# Patient Record
Sex: FEMALE | Race: WHITE | Employment: OTHER | URBAN - METROPOLITAN AREA
[De-identification: names, ages, dates, MRNs, and addresses within clinical notes are randomized per-mention and may not be internally consistent; named-entity substitution may affect disease eponyms.]

---

## 2018-09-05 ENCOUNTER — OFFICE VISIT (OUTPATIENT)
Dept: FAMILY MEDICINE CLINIC | Age: 68
End: 2018-09-05
Payer: MEDICARE

## 2018-09-05 VITALS
DIASTOLIC BLOOD PRESSURE: 70 MMHG | RESPIRATION RATE: 16 BRPM | TEMPERATURE: 98.4 F | HEART RATE: 62 BPM | OXYGEN SATURATION: 95 % | WEIGHT: 136 LBS | HEIGHT: 67 IN | BODY MASS INDEX: 21.35 KG/M2 | SYSTOLIC BLOOD PRESSURE: 118 MMHG

## 2018-09-05 DIAGNOSIS — I73.9 PVD (PERIPHERAL VASCULAR DISEASE) WITH CLAUDICATION (HCC): ICD-10-CM

## 2018-09-05 DIAGNOSIS — Z12.31 ENCOUNTER FOR SCREENING MAMMOGRAM FOR MALIGNANT NEOPLASM OF BREAST: ICD-10-CM

## 2018-09-05 DIAGNOSIS — I10 ESSENTIAL HYPERTENSION: Primary | ICD-10-CM

## 2018-09-05 DIAGNOSIS — E78.2 MIXED HYPERLIPIDEMIA: ICD-10-CM

## 2018-09-05 DIAGNOSIS — I48.20 CHRONIC ATRIAL FIBRILLATION (HCC): ICD-10-CM

## 2018-09-05 DIAGNOSIS — Z23 NEED FOR STREPTOCOCCUS PNEUMONIAE VACCINATION: ICD-10-CM

## 2018-09-05 PROCEDURE — 99214 OFFICE O/P EST MOD 30 MIN: CPT | Performed by: FAMILY MEDICINE

## 2018-09-05 PROCEDURE — 90732 PPSV23 VACC 2 YRS+ SUBQ/IM: CPT | Performed by: FAMILY MEDICINE

## 2018-09-05 PROCEDURE — G0009 ADMIN PNEUMOCOCCAL VACCINE: HCPCS | Performed by: FAMILY MEDICINE

## 2018-09-05 NOTE — PROGRESS NOTES
9/18/2018    Chief Complaint   Patient presents with    Hypertension     Pt here for 6 month check-up -- (no refills needed. ..gets from Cardiologist office) -- states no new complaints    Other     Pt is requesting an order for [facility] & [facility]    Lela Arvizu is a 77 y.o. patient that presents today for:    Hypertension: Patient is here for follow up chronic hypertension. Patient is  compliant with lifestyle modifications: is exercising and is adherent to low salt diet. Patient is  well controlled. Patient denies chest pain, diaphoresis, dyspnea, dyspnea on exertion, peripheral edema, palpitations, HA, visual issues. Cardiovascular risk factors: advanced age (older than 54 for men, 72 for women) and hypertension. Patient does not smoke. Is currently on Metoprolol 25 mg BID, spironolactone 25 mg, furosemide 20 mg. Taking as prescribed. No adverse effects. Last EKG:    Hyperlipidemia:  Patient presents with hyperlipidemia. Is asymptomatic. This is a chronic problem. Patient is  well controlled, as reviewed and seen on most recent labs. Is currently on . Atorvastatin 20mg Compliance with treatment thus far has been good. No adverse effects. The patient does use medications that may worsen dyslipidemias (corticosteroids, progestins, anabolic steroids, diuretics, beta-blockers, amiodarone, cyclosporine, olanzapine). The patient exercises rarely. Patient is not a smoker. Atrial fibrillation:  Patient is here to follow up regarding a fib. This is a/an chronic problem. It is  stable and controlled at this time. She does not have history of cardioversion or ablation. Current anticoagulation includes pradaxa. Patient does not have any active bleeding. Patient does not have chest pain, shortness of breath, palpitations, dizziness, light headedness, or syncope. She does see a Cardiologist regularly.   Last Cardiology progress note is  in medical record and, if available, 98.4 °F (36.9 °C)   01/16/18 97.8 °F (36.6 °C)   04/27/13 98.6 °F (37 °C) (Oral)     Wt Readings from Last 3 Encounters:   09/05/18 136 lb (61.7 kg)   01/16/18 132 lb (59.9 kg)   04/27/13 140 lb (63.5 kg)     BP Readings from Last 3 Encounters:   09/05/18 118/70   01/16/18 120/72   04/27/13 144/78     Pulse Readings from Last 3 Encounters:   09/05/18 62   01/16/18 52   04/27/13 104       General appearance: alert, well appearing, and in no distress, oriented to person, place, and time and normal appearing weight. CVS exam: normal rate, regular rhythm, normal S1, S2, no murmurs, rubs, clicks or gallops. Radial pulses 2+ bilateral.  PT/DP pulse 2+ bilat. No C/C/E    Chest: clear to auscultation, no wheezes, rales or rhonchi, symmetric air entry. Abdomen: Soft, non-tender, non-distended, positive BS in all 4 quadrants    Extremities:Dorsalis pedis pulses palpated bilaterally, no clubbing, cyanosis, edema or erythema     SKIN: no lesions, jaundice, petechiae, pallor, cyanosis, ecchymosis    NEURO: gross motor exam normal by observation, gait normal    Mental status - alert, oriented to person, place, and time, normal mood, behavior, speech, dress, motor activity, and thought processes      Assessment/Plan  Alicia Ruggiero was seen today for hypertension and other. Diagnoses and all orders for this visit:    Essential hypertension  -  Stable, continue medications as prescribed. Mixed hyperlipidemia  -  Stable, continue medications as prescribed. Chronic atrial fibrillation [facility])  - Per Cardiology. PVD (peripheral vascular disease) with claudication (HCC)  -     Handicap Placard MISC; by Does not apply route Duration: Lifetime    Need for Streptococcus pneumoniae vaccination  -     Pneumococcal polysaccharide vaccine 23-valent greater than or equal to 1yo subcutaneous/IM    Encounter for screening mammogram for malignant neoplasm of breast  -     ANKUSH DIGITAL SCREEN W CAD BILATERAL;  Future          Return

## 2018-09-19 ENCOUNTER — HOSPITAL ENCOUNTER (OUTPATIENT)
Dept: GENERAL RADIOLOGY | Age: 68
Discharge: HOME | End: 2018-09-21
Payer: MEDICARE

## 2018-09-19 DIAGNOSIS — Z12.31 ENCOUNTER FOR SCREENING MAMMOGRAM FOR MALIGNANT NEOPLASM OF BREAST: ICD-10-CM

## 2018-09-19 PROCEDURE — 77063 BREAST TOMOSYNTHESIS BI: CPT

## 2018-10-22 ENCOUNTER — NURSE ONLY (OUTPATIENT)
Dept: FAMILY MEDICINE CLINIC | Age: 68
End: 2018-10-22
Payer: MEDICARE

## 2018-10-22 DIAGNOSIS — Z23 NEED FOR VACCINATION: Primary | ICD-10-CM

## 2018-10-22 PROCEDURE — 90662 IIV NO PRSV INCREASED AG IM: CPT | Performed by: FAMILY MEDICINE

## 2018-10-22 PROCEDURE — G0008 ADMIN INFLUENZA VIRUS VAC: HCPCS | Performed by: FAMILY MEDICINE

## 2019-03-15 ENCOUNTER — TELEPHONE (OUTPATIENT)
Dept: FAMILY MEDICINE CLINIC | Age: 69
End: 2019-03-15

## 2019-03-15 RX ORDER — OSELTAMIVIR PHOSPHATE 75 MG/1
75 CAPSULE ORAL 2 TIMES DAILY
Qty: 10 CAPSULE | Refills: 0 | Status: SHIPPED | OUTPATIENT
Start: 2019-03-15 | End: 2019-03-20

## 2019-03-15 NOTE — TELEPHONE ENCOUNTER
Pt called and stated her granddaughter went to the [facility] clinic and was dx with the influ but doesn't know which one.    Sumi Santos is not having any symptoms but should she be on Tamiflu ? Pt uses [facility] in [place].  Nicki's ph# [phone].

## 2019-06-18 LAB
ALBUMIN SERPL-MCNC: NORMAL G/DL
ALP BLD-CCNC: NORMAL U/L
ALT SERPL-CCNC: NORMAL U/L
ANION GAP SERPL CALCULATED.3IONS-SCNC: NORMAL MMOL/L
AST SERPL-CCNC: NORMAL U/L
BILIRUB SERPL-MCNC: NORMAL MG/DL (ref 0.1–1.4)
BUN BLDV-MCNC: NORMAL MG/DL
CALCIUM SERPL-MCNC: NORMAL MG/DL
CHLORIDE BLD-SCNC: NORMAL MMOL/L
CO2: NORMAL MMOL/L
CREAT SERPL-MCNC: NORMAL MG/DL
GFR CALCULATED: NORMAL
GLUCOSE BLD-MCNC: 72 MG/DL
POTASSIUM SERPL-SCNC: NORMAL MMOL/L
SODIUM BLD-SCNC: NORMAL MMOL/L
TOTAL PROTEIN: NORMAL

## 2019-09-11 ENCOUNTER — OFFICE VISIT (OUTPATIENT)
Dept: FAMILY MEDICINE CLINIC | Age: 69
End: 2019-09-11
Payer: MEDICARE

## 2019-09-11 VITALS
OXYGEN SATURATION: 98 % | SYSTOLIC BLOOD PRESSURE: 108 MMHG | HEART RATE: 68 BPM | BODY MASS INDEX: 21.28 KG/M2 | WEIGHT: 135.6 LBS | DIASTOLIC BLOOD PRESSURE: 70 MMHG | RESPIRATION RATE: 16 BRPM | HEIGHT: 67 IN | TEMPERATURE: 98.1 F

## 2019-09-11 DIAGNOSIS — E78.2 MIXED HYPERLIPIDEMIA: ICD-10-CM

## 2019-09-11 DIAGNOSIS — L30.9 DERMATITIS: ICD-10-CM

## 2019-09-11 DIAGNOSIS — I48.20 CHRONIC ATRIAL FIBRILLATION (HCC): ICD-10-CM

## 2019-09-11 DIAGNOSIS — I10 ESSENTIAL HYPERTENSION: ICD-10-CM

## 2019-09-11 DIAGNOSIS — I42.9 CARDIOMYOPATHY, UNSPECIFIED TYPE (HCC): ICD-10-CM

## 2019-09-11 DIAGNOSIS — Z00.00 ROUTINE GENERAL MEDICAL EXAMINATION AT A HEALTH CARE FACILITY: Primary | ICD-10-CM

## 2019-09-11 PROCEDURE — G0438 PPPS, INITIAL VISIT: HCPCS | Performed by: FAMILY MEDICINE

## 2019-09-11 RX ORDER — ATORVASTATIN CALCIUM 40 MG/1
40 TABLET, FILM COATED ORAL DAILY
COMMUNITY
Start: 2019-08-08

## 2019-09-11 RX ORDER — FUROSEMIDE 40 MG/1
TABLET ORAL
Refills: 3 | COMMUNITY
Start: 2019-09-07

## 2019-09-11 ASSESSMENT — PATIENT HEALTH QUESTIONNAIRE - PHQ9
1. LITTLE INTEREST OR PLEASURE IN DOING THINGS: 0
SUM OF ALL RESPONSES TO PHQ9 QUESTIONS 1 & 2: 0
2. FEELING DOWN, DEPRESSED OR HOPELESS: 0
SUM OF ALL RESPONSES TO PHQ QUESTIONS 1-9: 0
SUM OF ALL RESPONSES TO PHQ QUESTIONS 1-9: 0

## 2019-09-11 ASSESSMENT — LIFESTYLE VARIABLES: HOW OFTEN DO YOU HAVE A DRINK CONTAINING ALCOHOL: 0

## 2019-09-11 NOTE — PROGRESS NOTES
regularly involved in providing care):   Patient Care Team:  Merly Hidalgo DO as PCP - General (Family Medicine)  Meryl Hidalgo DO as PCP [facility]    Wt Readings from Last 3 Encounters:   09/11/19 135 lb 9.6 oz (61.5 kg)   09/05/18 136 lb (61.7 kg)   01/16/18 132 lb (59.9 kg)     Vitals:    09/11/19 1047   BP: 108/70   Pulse: 68   Resp: 16   Temp: 98.1 °F (36.7 °C)   SpO2: 98%   Weight: 135 lb 9.6 oz (61.5 kg)   Height: 5' 6.5\" (1.689 m)     Body mass index is 21.56 kg/m². Based upon direct observation of the patient, evaluation of cognition reveals recent and remote memory intact. General Appearance: alert and oriented to person, place and time, well developed and well- nourished, in no acute distress  Skin: warm and dry, no rash or erythema  Head: normocephalic and atraumatic  Eyes: pupils equal, round, and reactive to light, extraocular eye movements intact, conjunctivae normal  ENT: tympanic membrane, external ear and ear canal normal bilaterally, nose without deformity, nasal mucosa and turbinates normal without polyps  Neck: supple and non-tender without mass, no thyromegaly or thyroid nodules, no cervical lymphadenopathy  Pulmonary/Chest: clear to auscultation bilaterally- no wheezes, rales or rhonchi, normal air movement, no respiratory distress  Cardiovascular: normal rate, regular rhythm, normal S1 and S2, no murmurs, rubs, clicks, or gallops, distal pulses intact, no carotid bruits  Abdomen: soft, non-tender, non-distended, normal bowel sounds, no masses or organomegaly  Extremities: no cyanosis, clubbing or edema  Musculoskeletal: normal range of motion, no joint swelling, deformity or tenderness  Neurologic: reflexes normal and symmetric, no cranial nerve deficit, gait, coordination and speech normal    Patient's complete Health Risk Assessment and screening values have been reviewed and are found in Flowsheets.  The following problems were reviewed today and where indicated follow up appointments were made and/or referrals ordered. Positive Risk Factor Screenings with Interventions:     No Positive Risk Factors identified today. Personalized Preventive Plan   Current Health Maintenance Status  Immunization History   Administered Date(s) Administered    Influenza, High Dose (Fluzone 65 yrs and older) 10/25/2017, 10/22/2018    Pneumococcal Conjugate 13-valent ([id number]) 07/18/2017    Pneumococcal Polysaccharide ([id number]) 09/05/2018        Health Maintenance   Topic Date Due    Hepatitis C screen  1950    DTaP/Tdap/Td vaccine (1 - Tdap) 01/17/1967    Shingles Vaccine (1 of 2) 01/17/1998    Colon cancer screen colonoscopy  01/17/1998    TSH testing  02/12/2012    Potassium monitoring  02/15/2012    Creatinine monitoring  02/15/2012    DEXA (modify frequency per FRAX score)  01/17/2013    Lipid screen  02/12/2016    Annual Wellness Visit (AWV)  06/20/2019    Flu vaccine (1) 09/01/2019    Breast cancer screen  09/19/2019    Pneumococcal 65+ years Vaccine  Completed     Recommendations for Preventive Services Due: see orders and patient instructions/AVS.  . Recommended screening schedule for the next 5-10 years is provided to the patient in written form: see Patient Instructions/AVS.    Desmond Merritt was seen today for medicare awv. Diagnoses and all orders for this visit:    Routine general medical examination at a health care facility    Mixed hyperlipidemia  -     Comprehensive Metabolic Panel; Future  -     Lipid Panel; Future    Essential hypertension  -     CBC; Future  -     Comprehensive Metabolic Panel; Future    Chronic atrial fibrillation (HCC)  -     TSH without Reflex;  Future    Cardiomyopathy, unspecified type ([facility] [address]

## 2019-09-30 LAB
ALBUMIN SERPL-MCNC: NORMAL G/DL
ALP BLD-CCNC: NORMAL U/L
ALT SERPL-CCNC: NORMAL U/L
ANION GAP SERPL CALCULATED.3IONS-SCNC: NORMAL MMOL/L
AST SERPL-CCNC: NORMAL U/L
BASOPHILS ABSOLUTE: NORMAL /ΜL
BASOPHILS RELATIVE PERCENT: NORMAL %
BILIRUB SERPL-MCNC: NORMAL MG/DL (ref 0.1–1.4)
BUN BLDV-MCNC: NORMAL MG/DL
CALCIUM SERPL-MCNC: NORMAL MG/DL
CHLORIDE BLD-SCNC: NORMAL MMOL/L
CHOLESTEROL, TOTAL: 103 MG/DL
CHOLESTEROL/HDL RATIO: 2.5
CO2: NORMAL MMOL/L
CREAT SERPL-MCNC: NORMAL MG/DL
EOSINOPHILS ABSOLUTE: NORMAL /ΜL
EOSINOPHILS RELATIVE PERCENT: NORMAL %
GFR CALCULATED: NORMAL
GLUCOSE BLD-MCNC: 86 MG/DL
HCT VFR BLD CALC: 37.9 % (ref 36–46)
HDLC SERPL-MCNC: 41 MG/DL (ref 35–70)
HEMOGLOBIN: 12.5 G/DL (ref 12–16)
LDL CHOLESTEROL CALCULATED: 43 MG/DL (ref 0–160)
LYMPHOCYTES ABSOLUTE: NORMAL /ΜL
LYMPHOCYTES RELATIVE PERCENT: NORMAL %
MCH RBC QN AUTO: NORMAL PG
MCHC RBC AUTO-ENTMCNC: NORMAL G/DL
MCV RBC AUTO: NORMAL FL
MONOCYTES ABSOLUTE: NORMAL /ΜL
MONOCYTES RELATIVE PERCENT: NORMAL %
NEUTROPHILS ABSOLUTE: NORMAL /ΜL
NEUTROPHILS RELATIVE PERCENT: NORMAL %
PLATELET # BLD: NORMAL K/ΜL
PMV BLD AUTO: NORMAL FL
POTASSIUM SERPL-SCNC: NORMAL MMOL/L
RBC # BLD: NORMAL 10^6/ΜL
SODIUM BLD-SCNC: NORMAL MMOL/L
TOTAL PROTEIN: NORMAL
TRIGL SERPL-MCNC: 79 MG/DL
TSH SERPL DL<=0.05 MIU/L-ACNC: 0.44 UIU/ML
VLDLC SERPL CALC-MCNC: NORMAL MG/DL
WBC # BLD: NORMAL 10^3/ML

## 2019-10-02 ENCOUNTER — TELEPHONE (OUTPATIENT)
Dept: FAMILY MEDICINE CLINIC | Age: 69
End: 2019-10-02

## 2019-10-16 DIAGNOSIS — E78.2 MIXED HYPERLIPIDEMIA: ICD-10-CM

## 2019-10-16 DIAGNOSIS — I10 ESSENTIAL HYPERTENSION: ICD-10-CM

## 2019-10-17 ENCOUNTER — NURSE ONLY (OUTPATIENT)
Dept: FAMILY MEDICINE CLINIC | Age: 69
End: 2019-10-17
Payer: MEDICARE

## 2019-10-17 DIAGNOSIS — E78.2 MIXED HYPERLIPIDEMIA: ICD-10-CM

## 2019-10-17 DIAGNOSIS — I10 ESSENTIAL HYPERTENSION: ICD-10-CM

## 2019-10-17 DIAGNOSIS — I48.20 CHRONIC ATRIAL FIBRILLATION (HCC): ICD-10-CM

## 2019-10-17 PROCEDURE — 90653 IIV ADJUVANT VACCINE IM: CPT | Performed by: FAMILY MEDICINE

## 2019-10-17 PROCEDURE — G0008 ADMIN INFLUENZA VIRUS VAC: HCPCS | Performed by: FAMILY MEDICINE

## 2020-01-09 LAB
ALBUMIN SERPL-MCNC: NORMAL G/DL
ALP BLD-CCNC: NORMAL U/L
ALT SERPL-CCNC: NORMAL U/L
ANION GAP SERPL CALCULATED.3IONS-SCNC: NORMAL MMOL/L
AST SERPL-CCNC: NORMAL U/L
BILIRUB SERPL-MCNC: NORMAL MG/DL
BUN BLDV-MCNC: NORMAL MG/DL
CALCIUM SERPL-MCNC: NORMAL MG/DL
CHLORIDE BLD-SCNC: NORMAL MMOL/L
CO2: NORMAL
CREAT SERPL-MCNC: NORMAL MG/DL
GFR CALCULATED: NORMAL
GLUCOSE BLD-MCNC: 109 MG/DL
POTASSIUM SERPL-SCNC: NORMAL MMOL/L
SODIUM BLD-SCNC: NORMAL MMOL/L
TOTAL PROTEIN: NORMAL

## 2020-01-14 ENCOUNTER — TELEPHONE (OUTPATIENT)
Dept: PULMONOLOGY | Age: 70
End: 2020-01-14

## 2020-01-14 ENCOUNTER — OFFICE VISIT (OUTPATIENT)
Dept: FAMILY MEDICINE CLINIC | Age: 70
End: 2020-01-14
Payer: MEDICARE

## 2020-01-14 VITALS
OXYGEN SATURATION: 98 % | WEIGHT: 125 LBS | TEMPERATURE: 97.5 F | HEIGHT: 66 IN | BODY MASS INDEX: 20.09 KG/M2 | RESPIRATION RATE: 18 BRPM | HEART RATE: 82 BPM | SYSTOLIC BLOOD PRESSURE: 100 MMHG | DIASTOLIC BLOOD PRESSURE: 68 MMHG

## 2020-01-14 PROCEDURE — 99214 OFFICE O/P EST MOD 30 MIN: CPT | Performed by: FAMILY MEDICINE

## 2020-01-14 PROCEDURE — G8510 SCR DEP NEG, NO PLAN REQD: HCPCS | Performed by: FAMILY MEDICINE

## 2020-01-14 ASSESSMENT — PATIENT HEALTH QUESTIONNAIRE - PHQ9
SUM OF ALL RESPONSES TO PHQ QUESTIONS 1-9: 1
2. FEELING DOWN, DEPRESSED OR HOPELESS: 1
SUM OF ALL RESPONSES TO PHQ QUESTIONS 1-9: 1
1. LITTLE INTEREST OR PLEASURE IN DOING THINGS: 0
SUM OF ALL RESPONSES TO PHQ9 QUESTIONS 1 & 2: 1

## 2020-01-14 NOTE — TELEPHONE ENCOUNTER
Call to pt after Dr. Aliza Mcdonald received message from PCP re: referral being sent over. Dr. Aliza Mcdonald request pt appt in office next week. Advised pt that RN will schedule her for Thursday Jan 23rd@ 1130am; pt receptive to this appt date/time. Pt instructed to bring CD with Images of most recent CT Scan for Dr. María Lopez to review along with report from diagnostics. Pt will obtain CD with images from [facility] in [place] and bring with her to appt. Advised office will mail out appt letter. Spoke with staff at Dr. Angeline Bryant office as well and they will fax over referral and any info on pt.

## 2020-01-14 NOTE — PROGRESS NOTES
1/15/2020    Chief Complaint   Patient presents with    Pulmonary Nodule     brought CT reports wants to talk about results     Weight Loss       HPI    Ceasar Salazar is a 68 y.o. patient that presents today for:    Increasing dyspnea and weight loss. Saw NP at Cardiology office. Had CXR. Showed nodules. Follow-up CT showed  1 cm spiculated mass and 1 cm additional nodule. No CP, diaphoresis, palp, HA, visual issues. Has an appointment with Pulmonology in [facility] on 1/28/20. Would like to see someone locally. Patient's past medical, surgical, social and/or family history reviewed, updated in chart, and are non-contributory (unless otherwise stated). Medications and allergies also reviewed and updated in chart.      ROS Unless otherwise specified  Review of Systems - General ROS: negative for - chills, fatigue, fever, night sweats, sleep disturbance, weight gain or weight loss  Psychological ROS: negative for - anxiety, behavioral disorder, depression, hallucinations, irritability, memory difficulties, mood swings, sleep disturbances or suicidal ideation  ENT ROS: negative for - epistaxis, headaches, hearing change, nasal congestion, nasal discharge, nasal polyps, sinus pain, tinnitus, vertigo or visual changes  Hematological and Lymphatic ROS: negative for - bleeding problems, blood clots, fatigue or swollen lymph nodes  Respiratory ROS: negative for - cough, orthopnea, shortness of breath, sputum changes, tachypnea or wheezing  Cardiovascular ROS: negative for - chest pain, dyspnea on exertion, irregular heartbeat, loss of consciousness, palpitations, paroxysmal nocturnal dyspnea or rapid heart rate  Gastrointestinal ROS: negative for - abdominal pain, blood in stools, change in bowel habits, constipation, diarrhea, gas/bloating, heartburn or nausea/vomiting  Musculoskeletal ROS: negative for - joint pain, joint stiffness, joint swelling or muscle, back pain, bowel or bladder

## 2020-01-23 ENCOUNTER — OFFICE VISIT (OUTPATIENT)
Dept: PULMONOLOGY | Age: 70
End: 2020-01-23
Payer: MEDICARE

## 2020-01-23 VITALS — OXYGEN SATURATION: 100 % | HEART RATE: 77 BPM

## 2020-01-23 PROCEDURE — 99203 OFFICE O/P NEW LOW 30 MIN: CPT | Performed by: INTERNAL MEDICINE

## 2020-01-23 PROCEDURE — 99204 OFFICE O/P NEW MOD 45 MIN: CPT | Performed by: INTERNAL MEDICINE

## 2020-01-23 NOTE — PROGRESS NOTES
Take 25 mg by mouth daily       amiodarone (CORDARONE) 200 MG tablet Take 200 mg by mouth daily       dabigatran (PRADAXA) 150 MG capsule Take 150 mg by mouth 2 times daily        No current facility-administered medications for this visit. SOCIAL AND OCCUPATIONAL HEALTH:  Social History     Tobacco Use   Smoking Status Former Smoker    Packs/day: 0.50    Years: 40.00    Pack years: 20.00    Types: Cigarettes    Last attempt to quit: 2011    Years since quittin.3   Smokeless Tobacco Former User    Quit date: 2011     TB :No   Industrial exposure:some   Birds :no     SURGICAL HISTORY:   Past Surgical History:   Procedure Laterality Date    COLONOSCOPY      Dendron     TUBAL LIGATION         FAMILY HISTORY:   Lung cancer:no   DVT or PE no     REVIEW OF SYSTEMS:  Constitutional: General health is good . There has been no weight changes. No fevers, fatigue or weakness. Head: Patient denies any history of trauma, convulsive disorder or syncope. Skin:  Patient denies history of changes in pigmentation, eruptions or pruritus. No easy bruising or bleeding. EENT: no nasal congestion   Cardiovascular ,No chest pain ,No edema ,  Respiration:SOB: + ,WALKER :+  Gastrointestinal:No GI bleed ,no melena  ,no hematemesis    Musculoskeletal: no joint pain ,no swelling  Neurological:no , syncope. Denies twitching, convulsions, loss of consciousness or memory. Endocrine:  . No history of goiter, exophthalmos or dryness of skin. The patient has no history of diabetes. Hematopoietic:  No history of bleeding disorders or easy bruising. Rheumatic:  No connective tissue disease history or polyarthritis/inflammatory joint disease. PHYSICAL EXAMINATION:  Vitals:    20 1042   Pulse: 77   SpO2: 100%     Constitutional: This is a well developed, well nourished 65y.o. year old female who is alert, oriented, cooperative and in no apparent distress. Head was normocephalic and atraumatic. EENT: Mallampati class :  Extraocular muscles intact. External canals are patent and no discharge was appreciated. Septum was midline,   mucosa was without erythema, exudates or cobblestoning. No thrush was noted. Neck: Supple without thyromegaly. No elevated JVP. Trachea was midline. No carotid bruits were auscultated. Respiratory: rhocnhi bilateral     Cardiovascular: Regular without murmur, clicks, gallops or rubs. There is no left or right ventricular heave. Pulses:  Carotid, radial and femoral pulses were equally bilaterally. Abdomen: Slightly rounded and soft without organomegaly. No rebound, rigidity or guarding was appreciated. Lymphatic: No lymphadenopathy. Musculoskeletal: no edema or cyanosis     Extremities: no swelling   Skin:  Warm and dry. Good color, turgor and pigmentation. No lesions or scars. Neurological/Psychiatric: The patient's general behavior, level of consciousness, thought content and emotional status is normal.  Cranial nerves II-XII are intact. DATA:         PFT shows mild COPD with FEV1 74 % post bronchodilator     IMPRESSION:    1-Lung nodules   2-Mediastinal adenopathy   3-Emphysema   4-weight loss   5- multiple lung nodules      6- Mild COPD           PLAN:      -will do PET scan   -Lymph nodes seems more than 1 cm so she will need EBUS for sure to make sure she is not metastatic ,  -since we are doing EBUS ,I will try to navigate to the lung nodules ,  If lymph nodes negative and no answer from [facility] ,then CT guided biopsy  -discuss above with her and she agreed to proceed     Will start Spirivia down the oad    She is on Pradaxa and will ask her cardiologist for clearance and if ok to stop Pradaxa 3 days before procedure     Thank you for allowing me to participate in [address] [place] [address].  I will keep following with you ,should you have any concerns ,please contact me at [phone]     Sincerely,        Sujit Reese MD  Pulmonary & Critical Care

## 2020-01-27 ENCOUNTER — HOSPITAL ENCOUNTER (OUTPATIENT)
Age: 70
Discharge: HOME | End: 2020-01-27
Payer: MEDICARE

## 2020-01-27 LAB
ANION GAP SERPL CALCULATED.3IONS-SCNC: 15 MMOL/L (ref 7–16)
BUN BLDV-MCNC: 24 MG/DL (ref 8–23)
CALCIUM SERPL-MCNC: 10.1 MG/DL (ref 8.6–10.2)
CHLORIDE BLD-SCNC: 100 MMOL/L (ref 98–107)
CO2: 25 MMOL/L (ref 22–29)
CREAT SERPL-MCNC: 1.2 MG/DL (ref 0.5–1)
EKG ATRIAL RATE: 78 BPM
EKG P AXIS: 74 DEGREES
EKG P-R INTERVAL: 144 MS
EKG Q-T INTERVAL: 400 MS
EKG QRS DURATION: 96 MS
EKG QTC CALCULATION (BAZETT): 456 MS
EKG R AXIS: -15 DEGREES
EKG T AXIS: 134 DEGREES
EKG VENTRICULAR RATE: 78 BPM
GFR AFRICAN AMERICAN: 53
GFR NON-AFRICAN AMERICAN: 44 ML/MIN/1.73
GLUCOSE BLD-MCNC: 93 MG/DL (ref 74–99)
POTASSIUM SERPL-SCNC: 4.4 MMOL/L (ref 3.5–5)
SODIUM BLD-SCNC: 140 MMOL/L (ref 132–146)

## 2020-01-27 PROCEDURE — 80048 BASIC METABOLIC PNL TOTAL CA: CPT

## 2020-01-27 PROCEDURE — 93010 ELECTROCARDIOGRAM REPORT: CPT | Performed by: INTERNAL MEDICINE

## 2020-01-27 PROCEDURE — 36415 COLL VENOUS BLD VENIPUNCTURE: CPT

## 2020-01-27 PROCEDURE — 93005 ELECTROCARDIOGRAM TRACING: CPT | Performed by: ANESTHESIOLOGY

## 2020-01-28 ENCOUNTER — TELEPHONE (OUTPATIENT)
Dept: FAMILY MEDICINE CLINIC | Age: 70
End: 2020-01-28

## 2020-01-28 ENCOUNTER — HOSPITAL ENCOUNTER (OUTPATIENT)
Dept: PET IMAGING | Age: 70
Discharge: HOME | End: 2020-01-30
Payer: MEDICARE

## 2020-01-28 ENCOUNTER — HOSPITAL ENCOUNTER (OUTPATIENT)
Dept: CT IMAGING | Age: 70
Discharge: HOME | End: 2020-01-30
Payer: MEDICARE

## 2020-01-28 LAB — METER GLUCOSE: 63 MG/DL (ref 74–99)

## 2020-01-28 PROCEDURE — 82962 GLUCOSE BLOOD TEST: CPT

## 2020-01-28 PROCEDURE — A9552 F18 FDG: HCPCS | Performed by: RADIOLOGY

## 2020-01-28 PROCEDURE — 3430000000 HC RX DIAGNOSTIC RADIOPHARMACEUTICAL: Performed by: RADIOLOGY

## 2020-01-28 PROCEDURE — 78815 PET IMAGE W/CT SKULL-THIGH: CPT

## 2020-01-28 PROCEDURE — 71250 CT THORAX DX C-: CPT

## 2020-01-28 RX ORDER — FLUDEOXYGLUCOSE F 18 200 MCI/ML
15 INJECTION, SOLUTION INTRAVENOUS
Status: COMPLETED | OUTPATIENT
Start: 2020-01-28 | End: 2020-01-28

## 2020-01-28 RX ADMIN — FLUDEOXYGLUCOSE F 18 15 MILLICURIE: 200 INJECTION, SOLUTION INTRAVENOUS at 07:45

## 2020-01-28 NOTE — PROGRESS NOTES
if you are to spend the night in the hospital.     PARKING INSTRUCTIONS:   [x] Arrival Time:_______1130______  · [x] Parking lot '\"I\"  is located on [facility] (the corner of [facility] and [facility]). To enter, press the button and the gate will lift. A free token will be provided to exit the lot. One car per patient is allowed to park in this lot. All other cars are to park on [address] [place] [address] either in the parking garage or the handicap lot. [] To reach the [facility] lobby from [address] [place] [address], upon entering the hospital, take elevator B to the 3rd floor. EDUCATION INSTRUCTIONS:      [] Knee or hip replacement booklet & exercise pamphlets given. [] [address] placed in chart. [] Pre-admission Testing educational folder given  [] Incentive Spirometry,coughing & deep breathing exercises reviewed. []Medication information sheet(s)   []Fluoroscopy-Xray used in surgery reviewed with patient. Educational pamphlet placed in chart. []Pain: Post-op pain is normal and to be expected. You will be asked to rate your pain from 0-10(a zero is not acceptable-education is needed). Your post-op pain goal is:  [] Ask your nurse for your pain medication. [] Joint camp offered. [] Joint replacement booklets given. [] Other:___________________________    MEDICATION INSTRUCTIONS:   [x]Bring a complete list of your medications, please write the last time you took the medicine, give this list to the nurse. [x] Take the following medications the morning of surgery with 1-2 ounces of water:   [] Stop herbal supplements and vitamins 5 days before your surgery. [] DO NOT take any diabetic medicine the morning of surgery. Follow instructions for insulin the day before surgery. [] If you are diabetic and your blood sugar is low or you feel symptomatic, you may drink 1-2 ounces of apple juice or take a glucose tablet.   The morning of your procedure, you may call the pre-op area if you have concerns about your blood sugar [phone]. [] Use your inhalers the morning of surgery. Bring your emergency inhaler with you day of surgery. [x] Follow physician instructions regarding any blood thinners you may be taking. WHAT TO EXPECT:  [x] The day of surgery you will be greeted and checked in by the [employer].  In addition, you will be registered in the [place] by a Patient Access Representative. Please bring your photo ID and insurance card. A nurse will greet you in accordance to the time you are needed in the pre-op area to prepare you for surgery. Please do not be discouraged if you are not greeted in the order you arrive as there are many variables that are involved in patient preparation. Your patience is greatly appreciated as you wait for your nurse. Please bring in items such as: books, magazines, newspapers, electronics, or any other items  to occupy your time in the waiting area. []  Delays may occur with surgery and staff will make a sincere effort to keep you informed of delays. If any delays occur with your procedure, we apologize ahead of time for your inconvenience as we recognize the value of your time. Yes...

## 2020-01-29 ENCOUNTER — TELEPHONE (OUTPATIENT)
Dept: FAMILY MEDICINE CLINIC | Age: 70
End: 2020-01-29

## 2020-01-29 NOTE — TELEPHONE ENCOUNTER
Buddy Ornelas just returned my call and said Dr. Jed Bullock will be in office today and would probably send over a note for clearance. He was aware that she was having procedure because told her OK to stop pradaxa.

## 2020-01-29 NOTE — TELEPHONE ENCOUNTER
Luis Fernando Samayoa needs cardiac clearance for bronchoscopy on 1/31/20, general anesthesia. Sent copy of EKG to Dr. Elham Dudley to review. Left message with .  Also faxed copy of Dr. García Estevez EKG from July 2019 to pre-admission testing because they did not have previous one for review

## 2020-01-31 ENCOUNTER — ANESTHESIA (OUTPATIENT)
Dept: ENDOSCOPY | Age: 70
End: 2020-01-31
Payer: MEDICARE

## 2020-01-31 ENCOUNTER — APPOINTMENT (OUTPATIENT)
Dept: GENERAL RADIOLOGY | Age: 70
End: 2020-01-31
Attending: INTERNAL MEDICINE
Payer: MEDICARE

## 2020-01-31 ENCOUNTER — HOSPITAL ENCOUNTER (OUTPATIENT)
Age: 70
Setting detail: OUTPATIENT SURGERY
Discharge: HOME | End: 2020-01-31
Attending: INTERNAL MEDICINE | Admitting: INTERNAL MEDICINE
Payer: MEDICARE

## 2020-01-31 ENCOUNTER — ANESTHESIA EVENT (OUTPATIENT)
Dept: ENDOSCOPY | Age: 70
End: 2020-01-31
Payer: MEDICARE

## 2020-01-31 VITALS
TEMPERATURE: 97.1 F | RESPIRATION RATE: 18 BRPM | SYSTOLIC BLOOD PRESSURE: 104 MMHG | DIASTOLIC BLOOD PRESSURE: 72 MMHG | HEART RATE: 84 BPM | OXYGEN SATURATION: 94 % | BODY MASS INDEX: 20.09 KG/M2 | HEIGHT: 66 IN | WEIGHT: 125 LBS

## 2020-01-31 VITALS — DIASTOLIC BLOOD PRESSURE: 51 MMHG | SYSTOLIC BLOOD PRESSURE: 90 MMHG | OXYGEN SATURATION: 100 %

## 2020-01-31 LAB
ANION GAP SERPL CALCULATED.3IONS-SCNC: 15 MMOL/L (ref 7–16)
BUN BLDV-MCNC: 19 MG/DL (ref 8–23)
CALCIUM SERPL-MCNC: 9.9 MG/DL (ref 8.6–10.2)
CHLORIDE BLD-SCNC: 98 MMOL/L (ref 98–107)
CO2: 22 MMOL/L (ref 22–29)
CREAT SERPL-MCNC: 1.1 MG/DL (ref 0.5–1)
GFR AFRICAN AMERICAN: 59
GFR NON-AFRICAN AMERICAN: 49 ML/MIN/1.73
GLUCOSE BLD-MCNC: 87 MG/DL (ref 74–99)
POTASSIUM SERPL-SCNC: 5 MMOL/L (ref 3.5–5)
SODIUM BLD-SCNC: 135 MMOL/L (ref 132–146)

## 2020-01-31 PROCEDURE — 6360000002 HC RX W HCPCS: Performed by: NURSE ANESTHETIST, CERTIFIED REGISTERED

## 2020-01-31 PROCEDURE — 87015 SPECIMEN INFECT AGNT CONCNTJ: CPT

## 2020-01-31 PROCEDURE — 71045 X-RAY EXAM CHEST 1 VIEW: CPT

## 2020-01-31 PROCEDURE — 2709999900 HC NON-CHARGEABLE SUPPLY: Performed by: INTERNAL MEDICINE

## 2020-01-31 PROCEDURE — 7100000010 HC PHASE II RECOVERY - FIRST 15 MIN: Performed by: INTERNAL MEDICINE

## 2020-01-31 PROCEDURE — 80048 BASIC METABOLIC PNL TOTAL CA: CPT

## 2020-01-31 PROCEDURE — 87102 FUNGUS ISOLATION CULTURE: CPT

## 2020-01-31 PROCEDURE — 87205 SMEAR GRAM STAIN: CPT

## 2020-01-31 PROCEDURE — 36415 COLL VENOUS BLD VENIPUNCTURE: CPT

## 2020-01-31 PROCEDURE — 87206 SMEAR FLUORESCENT/ACID STAI: CPT

## 2020-01-31 PROCEDURE — 7100000000 HC PACU RECOVERY - FIRST 15 MIN: Performed by: INTERNAL MEDICINE

## 2020-01-31 PROCEDURE — 3603165200 HC BRNCHSC EBUS GUIDED SAMPL 1/2 NODE STATION/STRUX: Performed by: INTERNAL MEDICINE

## 2020-01-31 PROCEDURE — C1725 CATH, TRANSLUMIN NON-LASER: HCPCS | Performed by: INTERNAL MEDICINE

## 2020-01-31 PROCEDURE — 2580000003 HC RX 258: Performed by: INTERNAL MEDICINE

## 2020-01-31 PROCEDURE — 3609011900 HC BRONCHOSCOPY NEEDLE BX TRACHEA MAIN STEM&/BRON: Performed by: INTERNAL MEDICINE

## 2020-01-31 PROCEDURE — 88305 TISSUE EXAM BY PATHOLOGIST: CPT

## 2020-01-31 PROCEDURE — 3209999900 FLUORO FOR SURGICAL PROCEDURES

## 2020-01-31 PROCEDURE — 89051 BODY FLUID CELL COUNT: CPT

## 2020-01-31 PROCEDURE — 3700000000 HC ANESTHESIA ATTENDED CARE: Performed by: INTERNAL MEDICINE

## 2020-01-31 PROCEDURE — 31624 DX BRONCHOSCOPE/LAVAGE: CPT | Performed by: INTERNAL MEDICINE

## 2020-01-31 PROCEDURE — 31629 BRONCHOSCOPY/NEEDLE BX EACH: CPT | Performed by: INTERNAL MEDICINE

## 2020-01-31 PROCEDURE — 87070 CULTURE OTHR SPECIMN AEROBIC: CPT

## 2020-01-31 PROCEDURE — 3609010800 HC BRONCHOSCOPY ALVEOLAR LAVAGE: Performed by: INTERNAL MEDICINE

## 2020-01-31 PROCEDURE — 2500000003 HC RX 250 WO HCPCS: Performed by: NURSE ANESTHETIST, CERTIFIED REGISTERED

## 2020-01-31 PROCEDURE — 3700000001 HC ADD 15 MINUTES (ANESTHESIA): Performed by: INTERNAL MEDICINE

## 2020-01-31 PROCEDURE — 31653 BRONCH EBUS SAMPLNG 3/> NODE: CPT | Performed by: INTERNAL MEDICINE

## 2020-01-31 PROCEDURE — 88112 CYTOPATH CELL ENHANCE TECH: CPT

## 2020-01-31 PROCEDURE — 3609011200 HC BRONCHOSCOPY W/TRANSBRONCL NDL ASPIR BX EA ADDL LOBE: Performed by: INTERNAL MEDICINE

## 2020-01-31 PROCEDURE — 7100000001 HC PACU RECOVERY - ADDTL 15 MIN: Performed by: INTERNAL MEDICINE

## 2020-01-31 PROCEDURE — 2720000010 HC SURG SUPPLY STERILE: Performed by: INTERNAL MEDICINE

## 2020-01-31 PROCEDURE — 88173 CYTOPATH EVAL FNA REPORT: CPT

## 2020-01-31 PROCEDURE — 7100000011 HC PHASE II RECOVERY - ADDTL 15 MIN: Performed by: INTERNAL MEDICINE

## 2020-01-31 PROCEDURE — 3609155400 HC ADD ON COMPUTER ASSISTED NAVIGATION: Performed by: INTERNAL MEDICINE

## 2020-01-31 PROCEDURE — 87116 MYCOBACTERIA CULTURE: CPT

## 2020-01-31 RX ORDER — SODIUM CHLORIDE 9 MG/ML
INJECTION, SOLUTION INTRAVENOUS CONTINUOUS
Status: DISCONTINUED | OUTPATIENT
Start: 2020-01-31 | End: 2020-01-31 | Stop reason: HOSPADM

## 2020-01-31 RX ORDER — ROCURONIUM BROMIDE 10 MG/ML
INJECTION, SOLUTION INTRAVENOUS PRN
Status: DISCONTINUED | OUTPATIENT
Start: 2020-01-31 | End: 2020-01-31 | Stop reason: SDUPTHER

## 2020-01-31 RX ORDER — LIDOCAINE HYDROCHLORIDE 20 MG/ML
INJECTION, SOLUTION INTRAVENOUS PRN
Status: DISCONTINUED | OUTPATIENT
Start: 2020-01-31 | End: 2020-01-31 | Stop reason: SDUPTHER

## 2020-01-31 RX ORDER — DEXAMETHASONE SODIUM PHOSPHATE 10 MG/ML
INJECTION INTRAMUSCULAR; INTRAVENOUS PRN
Status: DISCONTINUED | OUTPATIENT
Start: 2020-01-31 | End: 2020-01-31 | Stop reason: SDUPTHER

## 2020-01-31 RX ORDER — PROPOFOL 10 MG/ML
INJECTION, EMULSION INTRAVENOUS CONTINUOUS PRN
Status: DISCONTINUED | OUTPATIENT
Start: 2020-01-31 | End: 2020-01-31 | Stop reason: SDUPTHER

## 2020-01-31 RX ORDER — PROPOFOL 10 MG/ML
INJECTION, EMULSION INTRAVENOUS PRN
Status: DISCONTINUED | OUTPATIENT
Start: 2020-01-31 | End: 2020-01-31 | Stop reason: SDUPTHER

## 2020-01-31 RX ORDER — LABETALOL HYDROCHLORIDE 5 MG/ML
INJECTION, SOLUTION INTRAVENOUS PRN
Status: DISCONTINUED | OUTPATIENT
Start: 2020-01-31 | End: 2020-01-31 | Stop reason: SDUPTHER

## 2020-01-31 RX ORDER — ALFENTANIL HYDROCHLORIDE 500 UG/ML
INJECTION INTRAVENOUS PRN
Status: DISCONTINUED | OUTPATIENT
Start: 2020-01-31 | End: 2020-01-31 | Stop reason: SDUPTHER

## 2020-01-31 RX ADMIN — LIDOCAINE HYDROCHLORIDE 60 MG: 20 INJECTION, SOLUTION INTRAVENOUS at 13:23

## 2020-01-31 RX ADMIN — PROPOFOL 100 MCG/KG/MIN: 10 INJECTION, EMULSION INTRAVENOUS at 13:24

## 2020-01-31 RX ADMIN — SODIUM CHLORIDE: 9 INJECTION, SOLUTION INTRAVENOUS at 12:12

## 2020-01-31 RX ADMIN — LABETALOL HYDROCHLORIDE 5 MG: 5 INJECTION INTRAVENOUS at 13:53

## 2020-01-31 RX ADMIN — ALFENTANIL HYDROCHLORIDE 500 MCG: 500 INJECTION INTRAVENOUS at 15:00

## 2020-01-31 RX ADMIN — SUGAMMADEX 113 MG: 100 INJECTION, SOLUTION INTRAVENOUS at 15:02

## 2020-01-31 RX ADMIN — LABETALOL HYDROCHLORIDE 5 MG: 5 INJECTION INTRAVENOUS at 13:38

## 2020-01-31 RX ADMIN — ALFENTANIL HYDROCHLORIDE 500 MCG: 500 INJECTION INTRAVENOUS at 13:56

## 2020-01-31 RX ADMIN — PROPOFOL 90 MG: 10 INJECTION, EMULSION INTRAVENOUS at 13:23

## 2020-01-31 RX ADMIN — Medication 30 MG: at 13:23

## 2020-01-31 RX ADMIN — LABETALOL HYDROCHLORIDE 5 MG: 5 INJECTION INTRAVENOUS at 13:25

## 2020-01-31 RX ADMIN — DEXAMETHASONE SODIUM PHOSPHATE 10 MG: 10 INJECTION INTRAMUSCULAR; INTRAVENOUS at 13:23

## 2020-01-31 ASSESSMENT — PULMONARY FUNCTION TESTS
PIF_VALUE: 50
PIF_VALUE: 50
PIF_VALUE: 41
PIF_VALUE: 46
PIF_VALUE: 40
PIF_VALUE: 50
PIF_VALUE: 40
PIF_VALUE: 20
PIF_VALUE: 28
PIF_VALUE: 3
PIF_VALUE: 50
PIF_VALUE: 50
PIF_VALUE: 38
PIF_VALUE: 32
PIF_VALUE: 31
PIF_VALUE: 50
PIF_VALUE: 40
PIF_VALUE: 2
PIF_VALUE: 50
PIF_VALUE: 46
PIF_VALUE: 50
PIF_VALUE: 39
PIF_VALUE: 38
PIF_VALUE: 28
PIF_VALUE: 3
PIF_VALUE: 50
PIF_VALUE: 50
PIF_VALUE: 42
PIF_VALUE: 21
PIF_VALUE: 48
PIF_VALUE: 41
PIF_VALUE: 4
PIF_VALUE: 41
PIF_VALUE: 40
PIF_VALUE: 5
PIF_VALUE: 20
PIF_VALUE: 50
PIF_VALUE: 37
PIF_VALUE: 21
PIF_VALUE: 28
PIF_VALUE: 1
PIF_VALUE: 49
PIF_VALUE: 39
PIF_VALUE: 50
PIF_VALUE: 34
PIF_VALUE: 41
PIF_VALUE: 1
PIF_VALUE: 36
PIF_VALUE: 39
PIF_VALUE: 34
PIF_VALUE: 32
PIF_VALUE: 39
PIF_VALUE: 50
PIF_VALUE: 29
PIF_VALUE: 50
PIF_VALUE: 44
PIF_VALUE: 3
PIF_VALUE: 50
PIF_VALUE: 37
PIF_VALUE: 33
PIF_VALUE: 30
PIF_VALUE: 50
PIF_VALUE: 38
PIF_VALUE: 40
PIF_VALUE: 41
PIF_VALUE: 50
PIF_VALUE: 39
PIF_VALUE: 21
PIF_VALUE: 20
PIF_VALUE: 46
PIF_VALUE: 50
PIF_VALUE: 2
PIF_VALUE: 32
PIF_VALUE: 49
PIF_VALUE: 50
PIF_VALUE: 20
PIF_VALUE: 44
PIF_VALUE: 3
PIF_VALUE: 28
PIF_VALUE: 50
PIF_VALUE: 39
PIF_VALUE: 40
PIF_VALUE: 50
PIF_VALUE: 39
PIF_VALUE: 39
PIF_VALUE: 3
PIF_VALUE: 50
PIF_VALUE: 40
PIF_VALUE: 2
PIF_VALUE: 20
PIF_VALUE: 50
PIF_VALUE: 40
PIF_VALUE: 50
PIF_VALUE: 27
PIF_VALUE: 21
PIF_VALUE: 50
PIF_VALUE: 40

## 2020-01-31 ASSESSMENT — PAIN - FUNCTIONAL ASSESSMENT: PAIN_FUNCTIONAL_ASSESSMENT: 0-10

## 2020-01-31 ASSESSMENT — PAIN SCALES - GENERAL: PAINLEVEL_OUTOF10: 0

## 2020-01-31 NOTE — ANESTHESIA PRE PROCEDURE
Department of Anesthesiology  Preprocedure Note       Name:  Raimundo Kelly   Age:  65 y.o.  :  1950                                          MRN:  [id number]         Date:  2020      Surgeon: Filiberto Hoawrd):  Sophie Garcia MD    Procedure: BRONCHOSCOPY EBUS (N/A )  BRONCHOSCOPY NAVIGATIONAL (N/A )    Medications prior to admission:   Prior to Admission medications    Medication Sig Start Date End Date Taking? Authorizing Provider   furosemide (LASIX) 40 MG tablet TAKE ONE TABLET BY MOUTH EVERY DAY 19  Yes Historical Provider, MD   atorvastatin (LIPITOR) 40 MG tablet  19  Yes Historical Provider, MD   metoprolol tartrate (LOPRESSOR) 25 MG tablet Take 25 mg by mouth 2 times daily  17  Yes Historical Provider, MD   spironolactone (ALDACTONE) 25 MG tablet Take 25 mg by mouth daily  18  Yes Historical Provider, MD   dabigatran (PRADAXA) 150 MG capsule Take 150 mg by mouth 2 times daily    Yes Historical Provider, MD   hydrocortisone 2.5 % cream Apply topically 2 times daily.  19   Makeda Hidalgo DO   Handicap Placard MISC by Does not apply route Duration: Lifetime 18   Makeda Hidalgo DO       Current medications:    Current Facility-Administered Medications   Medication Dose Route Frequency Provider Last Rate Last Dose    0.9 % sodium chloride infusion   Intravenous Continuous Muneer Mick Lance  mL/hr at 20 1212         Allergies:  No Known Allergies    Problem List:    Patient Active Problem List   Diagnosis Code    Varicose veins of both lower extremities I83.93    Spider veins I78.1    PVD (peripheral vascular disease) with claudication (HCC) I73.9    Atrial fibrillation (HCC) I48.91    Cardiomyopathy ([facility] [address] I42.9    Hyperlipidemia E78.5    Hypertension I10       Past Medical History:        Diagnosis Date    Atrial fibrillation ([facility] [address]     Dr. Jonathan Gonzales [facility])     lung    Cardiomyopathy [facility])     Dr. Bev Obrien, [facility]    CVA (cerebral vascular

## 2020-01-31 NOTE — PROGRESS NOTES
Dr. Isabel Abel called in. Notified patient's sao2 94% on room air and second chest x ray done ane was unremarkable. Ok to send patient home.

## 2020-01-31 NOTE — PROGRESS NOTES
Patient signed out per anesthesia and transferred to [address]. Patient admitted to [facility] Patient placed on appropriate monitors. . Patient assisted with liquids and nutrition. Family in with patient.

## 2020-01-31 NOTE — PROGRESS NOTES
Discharge instructions given, medications and follow up instructions reviewed. Patient and family member verbalized understanding, no other noted or stated problems at this time. Patient will follow up with physicians as directed. Patient notified that if she has increased bleeding from lungs or chest pain she is to come to Emergency. And that office will call her Monday to schedule her follow up appointment.

## 2020-02-01 LAB
APPEARANCE FLUID: CLEAR
BASO FLUID: 0 %
CELL COUNT FLUID TYPE: NORMAL
COLOR FLUID: COLORLESS
EOSINOPHIL FLUID: 4 %
GRAM STAIN ORDERABLE: NORMAL
LYMPHOCYTES, BODY FLUID: 12 %
MONOCYTE, FLUID: 38 %
NEUTROPHIL, FLUID: 46 %
NUCLEATED CELLS FLUID: 4 /UL
RBC FLUID: <2000 /UL

## 2020-02-01 NOTE — OP NOTE
[address], AND NAVIGATION BRONCHOSCOPY PROCEDURE NOTE    DATE OF PROCEDURE: 1/ 31 / 2020    INDICATIONS & HISTORY:  Lung Nodule with mediastinal adenopathy    PREOPERATIVE DIAGNOSIS:    Nodule with mediastinal adenopathy Possible       POSTOPERATIVE DIAGNOSES:  Possible lung cancer      PROCEDURE PERFORMED:   1-Diagnotic, Fiberoptic Bronchoscopy  2-Bronchial alveolar lavage from right upper lobe  3-EBUS- FNA station  4 R ,then station 7, station R 11  4-navigational bronchoscopy  5-needle aspirate using fluoroscopy from right lower lobe superior segment              SURGEON:    Laura Arana     ASSISTANT:   Bronchoscopy Nursing/Technical Team/OR Team    SEDATION:  propofol   [facility],       ANESTHESIA:    Lidocaine 2% ,       ANESTHESIOLOGIST:  Per EPIC Note    SPECIMENS:  [x] Bronchial sample(s) for      Fungal smear & culture,   Acid-fast bacillus Smear and Culture,    Gram stain, C&S,    PCP               Cytology               BD glucan              Galactomanin      Description of Procedure: The patient was taken to the endoscopy suite, Valarie Javed  and the procedure verified as Flexible Fiberoptic Bronchoscopy with navigational bronchoscopy and EBUS      After the patient was controlled with sedation bronchoscope was introduced through ET tube without difficulty. The scope was then passed into the trachea. Additional 2% lidocaine was used topically within the airway. Careful inspection of the tracheal lumen was accomplished. The scope was sequentially passed into all bronchial airways.            Endobronchial findings:   Vocal cord not seen    Trachea:  Normal mucosa, he the part seen outside the ET tube  Airam  Normal mucosa     Right Main Stem Bronchus   erythematous with thick mucous and the fragile mucosa  Right Upper Lobe Bronchi erythematous with thick mucous and the fragile mucosa    Right Middle Lobe Bronchi  Normal

## 2020-02-03 ENCOUNTER — TELEPHONE (OUTPATIENT)
Dept: PULMONOLOGY | Age: 70
End: 2020-02-03

## 2020-02-03 LAB
CULTURE, RESPIRATORY: NORMAL
SMEAR, RESPIRATORY: NORMAL

## 2020-02-03 RX ORDER — AMOXICILLIN AND CLAVULANATE POTASSIUM 875; 125 MG/1; MG/1
1 TABLET, FILM COATED ORAL 2 TIMES DAILY
Qty: 10 TABLET | Refills: 0 | Status: SHIPPED | OUTPATIENT
Start: 2020-02-03 | End: 2020-02-10

## 2020-02-05 ENCOUNTER — TELEPHONE (OUTPATIENT)
Dept: PULMONOLOGY | Age: 70
End: 2020-02-05

## 2020-02-12 ENCOUNTER — TELEPHONE (OUTPATIENT)
Dept: PULMONOLOGY | Age: 70
End: 2020-02-12

## 2020-02-12 NOTE — TELEPHONE ENCOUNTER
Call returned to pt after VM requesting RN discuss with Dr. Carlene Thompson her concerns about being off Pradaxa for procedure. Advised pt that RN has sent a message to Dr. Carlene Thompson to update him on her concerns. Advised pt RN will call her after talking with Dr. Carlene Thompson.

## 2020-03-09 LAB
FUNGUS (MYCOLOGY) CULTURE: NORMAL
FUNGUS STAIN: NORMAL

## 2020-03-24 LAB
AFB CULTURE (MYCOBACTERIA): NORMAL
AFB SMEAR: NORMAL

## 2020-05-04 ENCOUNTER — OFFICE VISIT (OUTPATIENT)
Dept: ORTHOPEDIC SURGERY | Age: 70
End: 2020-05-04
Payer: MEDICARE

## 2020-05-04 VITALS — TEMPERATURE: 98 F | HEIGHT: 66 IN | BODY MASS INDEX: 20.25 KG/M2 | WEIGHT: 126 LBS

## 2020-05-04 PROBLEM — M17.11 PRIMARY OSTEOARTHRITIS OF RIGHT KNEE: Status: ACTIVE | Noted: 2020-05-04

## 2020-05-04 PROBLEM — M16.11 PRIMARY OSTEOARTHRITIS OF RIGHT HIP: Status: ACTIVE | Noted: 2020-05-04

## 2020-05-04 PROCEDURE — 99203 OFFICE O/P NEW LOW 30 MIN: CPT | Performed by: ORTHOPAEDIC SURGERY

## 2020-05-04 PROCEDURE — 20610 DRAIN/INJ JOINT/BURSA W/O US: CPT | Performed by: ORTHOPAEDIC SURGERY

## 2020-05-04 RX ORDER — BUPIVACAINE HYDROCHLORIDE 2.5 MG/ML
3 INJECTION, SOLUTION INFILTRATION; PERINEURAL ONCE
Status: COMPLETED | OUTPATIENT
Start: 2020-05-04 | End: 2020-05-04

## 2020-05-04 RX ORDER — TRIAMCINOLONE ACETONIDE 40 MG/ML
80 INJECTION, SUSPENSION INTRA-ARTICULAR; INTRAMUSCULAR ONCE
Status: COMPLETED | OUTPATIENT
Start: 2020-05-04 | End: 2020-05-04

## 2020-05-04 RX ADMIN — TRIAMCINOLONE ACETONIDE 80 MG: 40 INJECTION, SUSPENSION INTRA-ARTICULAR; INTRAMUSCULAR at 11:30

## 2020-05-04 RX ADMIN — BUPIVACAINE HYDROCHLORIDE 7.5 MG: 2.5 INJECTION, SOLUTION INFILTRATION; PERINEURAL at 11:29

## 2020-05-04 NOTE — PROGRESS NOTES
Chief Complaint:   Chief Complaint   Patient presents with    Knee Pain     Pain in right knee for a few months. No previous injury of surgeries. No xrays. HPI   38-year-old woman complaining of several months of right knee anterior medial pain with some swelling. No history of surgery or specific injury to the right knee. Takes Tylenol for pain. Cannot use NSAIDs since she is on Coumadin for atrial fibrillation. She was recently worked up for pulmonary nodules, patient states she was diagnosed as negative for cancer but diagnosed with fungal infection valley fever.     Patient Active Problem List   Diagnosis    Varicose veins of both lower extremities    Spider veins    PVD (peripheral vascular disease) with claudication ([address]    Atrial fibrillation ([address]    Cardiomyopathy ([address]    Hyperlipidemia    Hypertension    Primary osteoarthritis of right knee    Primary osteoarthritis of right hip       Past Medical History:   Diagnosis Date    Atrial fibrillation ([address]     Dr. Ekta Miller [facility]) 2020    lung    Cardiomyopathy [facility])     Dr. Matthew Benítez, [address] CVA (cerebral vascular accident) [facility])     2014    Hyperlipidemia     Hypertension     PVD (peripheral vascular disease) with claudication ([address] 9/7/2016    Spider veins 9/7/2016    Varicose veins of both lower extremities 9/7/2016       Past Surgical History:   Procedure Laterality Date    BRONCHOSCOPY N/A 1/31/2020    BRONCHOSCOPY EBUS performed by Dominic Colin MD at [facility] 1/31/2020    BRONCHOSCOPY NAVIGATIONAL performed by Dominic Colin MD at [address] [place] [address]  1/31/2020    BRONCHOSCOPY ALVEOLAR LAVAGE performed by Dominic Colin MD at [address] [place] [address]  1/31/2020    BRONCHOSCOPY/TRANSBRONCHIAL NEEDLE BIOPSY performed by Dominic Colin MD at [address] [place] [address]  1/31/2020    BRONCHOSCOPY/TRANSBRONCHIAL NEEDLE BIOPSY ADDL LOBE performed by Dominic Colin MD at [facility] [address]  2017    ablation [facility]    COLONOSCOPY      [place]     TUBAL LIGATION         Current Outpatient Medications   Medication Sig Dispense Refill    furosemide (LASIX) 40 MG tablet TAKE ONE TABLET BY MOUTH EVERY DAY  3    atorvastatin (LIPITOR) 40 MG tablet Take 40 mg by mouth daily       Handicap Placard MISC by Does not apply route Duration: Lifetime 1 each 0    metoprolol tartrate (LOPRESSOR) 25 MG tablet Take 25 mg by mouth 2 times daily       spironolactone (ALDACTONE) 25 MG tablet Take 25 mg by mouth daily       dabigatran (PRADAXA) 150 MG capsule Take 150 mg by mouth 2 times daily        Current Facility-Administered Medications   Medication Dose Route Frequency Provider Last Rate Last Dose    triamcinolone acetonide (KENALOG-40) injection 80 mg  80 mg Intra-articular Once Heidy Lee MD        bupivacaine (MARCAINE) 0.25 % injection 7.5 mg  3 mL Intra-articular Once Heidy Lee MD           No Known Allergies    Social History     Socioeconomic History    Marital status:      Spouse name: None    Number of children: None    Years of education: None    Highest education level: None   Occupational History    Occupation: sales   Social Needs    Financial resource strain: None    Food insecurity     Worry: None     Inability: None    Transportation needs     Medical: None     Non-medical: None   Tobacco Use    Smoking status: Former Smoker     Packs/day: 0.50     Years: 40.00     Pack years: 20.00     Types: Cigarettes     Last attempt to quit: 2011     Years since quittin.6    Smokeless tobacco: Former User     Quit date: 2011   Substance and Sexual Activity    Alcohol use: No    Drug use: No    Sexual activity: Yes     Partners: Male   Lifestyle    Physical activity     Days per week: None     Minutes per session: None    Stress: None   Relationships    Social connections     Talks on phone: None without bone lesions, lytic changes or severe joint narrowing. Impression: Radiographically moderate degenerative changes right knee. ASSESSMENT/PLAN:    Galina was seen today for knee pain. Diagnoses and all orders for this visit:    Primary osteoarthritis of right knee  -     PA ARTHROCENTESIS ASPIR&/INJ MAJOR JT/BURSA W/O US    Pain and swelling of knee, right  -     XR HIP 2-3 VW W PELVIS RIGHT; Future  -     XR Knee Bilateral Standing; Future  -     XR KNEE RIGHT (1-2 VIEWS); Future    Primary osteoarthritis of right hip    Other orders  -     triamcinolone acetonide (KENALOG-40) injection 80 mg  -     bupivacaine (MARCAINE) 0.25 % injection 7.5 mg    Findings and images reviewed with the patient. She does have osteoarthritis in her right knee and hip but neither appear to be to the surgical level. She may have a degenerative meniscus tear right knee which may be manageable nonoperatively. She request a trial of steroid injection right knee. Also reviewed use of Tylenol and cyclical exercises for the right knee. After review of indications, risks and limitations, after alcohol prep, right knee injection with triamcinolone 80mg and 3 cc 0.25% marcaine given today. Pt tolerated without complication. Return in about 6 weeks (around 6/15/2020).        Guilherme Levi MD    5/4/2020  11:21 AM

## 2020-05-08 ENCOUNTER — TELEPHONE (OUTPATIENT)
Dept: ORTHOPEDIC SURGERY | Age: 70
End: 2020-05-08

## 2020-07-01 ENCOUNTER — HOSPITAL ENCOUNTER (OUTPATIENT)
Age: 70
Discharge: HOME | End: 2020-07-03
Payer: MEDICARE

## 2020-07-01 ENCOUNTER — OFFICE VISIT (OUTPATIENT)
Dept: ORTHOPEDIC SURGERY | Age: 70
End: 2020-07-01
Payer: MEDICARE

## 2020-07-01 VITALS — BODY MASS INDEX: 20.25 KG/M2 | HEIGHT: 66 IN | TEMPERATURE: 97.6 F | WEIGHT: 126 LBS

## 2020-07-01 LAB
APPEARANCE FLUID: NORMAL
CELL COUNT FLUID TYPE: NORMAL
COLOR FLUID: NORMAL
MONOCYTE, FLUID: 16 %
NEUTROPHIL, FLUID: 84 %
NUCLEATED CELLS FLUID: NORMAL /UL
RBC FLUID: NORMAL /UL

## 2020-07-01 PROCEDURE — 89060 EXAM SYNOVIAL FLUID CRYSTALS: CPT

## 2020-07-01 PROCEDURE — 87070 CULTURE OTHR SPECIMN AEROBIC: CPT

## 2020-07-01 PROCEDURE — 89051 BODY FLUID CELL COUNT: CPT

## 2020-07-01 PROCEDURE — 99214 OFFICE O/P EST MOD 30 MIN: CPT | Performed by: ORTHOPAEDIC SURGERY

## 2020-07-01 PROCEDURE — 87205 SMEAR GRAM STAIN: CPT

## 2020-07-01 PROCEDURE — 20610 DRAIN/INJ JOINT/BURSA W/O US: CPT | Performed by: ORTHOPAEDIC SURGERY

## 2020-07-01 NOTE — PROGRESS NOTES
Follow Up Visit     Cristóbal Garibay returns today for follow up visit regarding her right knee, she has mild swelling. Treatment thus far has included a steroid injection 5/4/20 with minimal relief by Dr. Mahamed Vale, she also had a reaction from the steroid which caused her face to flush. She recently developed swelling in her knee and also has some swelling in her ankle and was concerned. She is here for follow-up due to Dr. Mahamed Vale being unavailable. Physical Exam:     No data recorded    General: Alert and oriented x3, no acute distress  Cardiovascular/pulmonary: No labored breathing, peripheral perfusion intact  Musculoskeletal:    On exam, she does have a moderate effusion of the right knee. She has some pitting edema mainly in her ankle into her foot. She does not have any significant calf tenderness or swelling. The knee is grossly stable throughout range of motion. Controlled Substances Monitoring:      Imaging:  No new images. Previous images reviewed showing some mild arthritic changes of the knee      Procedure:  Right knee aspiration    The risks and benefits of knee aspiration were explained and the patient elected to proceed. The Right knee was prepped in sterile fashion and the skin was anesthetized with ethyl chloride spray. The joint was entered from a superolateral location with an 18g needle and 5cc of alon fluid was aspirated without difficulty. The fluid was sent STAT to the lab. The site was covered with a bandaid. The patient tolerated well. Assessment: Right knee effusion, likely unrelated peripheral edema from her ankle into her foot      Plan:   We discussed her swelling today. Feel her knee effusion is unrelated to her ankle edema. She is on Pradaxa for cardiomyopathy. We discussed possibility of blood clot although this is less of a concern currently, I am doubtful that that is what is causing her lower extremity edema.   We did discuss potential ultrasound if it is worsening. She denies any shortness of breath or other symptoms today. She did request aspiration of her knee which was performed. I told her she should call her cardiologist/primary care physician regarding the swelling in her ankle as it may be related to her heart or kidneys.   We will see her back as needed    Samantha De La Cruz MD  Orthopaedic Surgery   7/1/20  1:36 PM

## 2020-07-02 LAB
CRYSTALS, FLUID: NORMAL
GRAM STAIN ORDERABLE: NORMAL
SOURCE BODY FLUID: NORMAL

## 2020-07-06 LAB
BODY FLUID CULTURE, STERILE: NORMAL
GRAM STAIN RESULT: NORMAL

## 2020-09-16 ENCOUNTER — OFFICE VISIT (OUTPATIENT)
Dept: FAMILY MEDICINE CLINIC | Age: 70
End: 2020-09-16
Payer: MEDICARE

## 2020-09-16 VITALS
SYSTOLIC BLOOD PRESSURE: 98 MMHG | OXYGEN SATURATION: 92 % | TEMPERATURE: 97.6 F | RESPIRATION RATE: 18 BRPM | HEIGHT: 66 IN | DIASTOLIC BLOOD PRESSURE: 70 MMHG | BODY MASS INDEX: 20.73 KG/M2 | WEIGHT: 129 LBS | HEART RATE: 66 BPM

## 2020-09-16 PROCEDURE — G0439 PPPS, SUBSEQ VISIT: HCPCS | Performed by: FAMILY MEDICINE

## 2020-09-16 SDOH — ECONOMIC STABILITY: INCOME INSECURITY: HOW HARD IS IT FOR YOU TO PAY FOR THE VERY BASICS LIKE FOOD, HOUSING, MEDICAL CARE, AND HEATING?: NOT HARD AT ALL

## 2020-09-16 SDOH — ECONOMIC STABILITY: FOOD INSECURITY: WITHIN THE PAST 12 MONTHS, THE FOOD YOU BOUGHT JUST DIDN'T LAST AND YOU DIDN'T HAVE MONEY TO GET MORE.: NEVER TRUE

## 2020-09-16 SDOH — ECONOMIC STABILITY: FOOD INSECURITY: WITHIN THE PAST 12 MONTHS, YOU WORRIED THAT YOUR FOOD WOULD RUN OUT BEFORE YOU GOT MONEY TO BUY MORE.: NEVER TRUE

## 2020-09-16 ASSESSMENT — PATIENT HEALTH QUESTIONNAIRE - PHQ9
SUM OF ALL RESPONSES TO PHQ QUESTIONS 1-9: 0
SUM OF ALL RESPONSES TO PHQ QUESTIONS 1-9: 0
2. FEELING DOWN, DEPRESSED OR HOPELESS: 0
SUM OF ALL RESPONSES TO PHQ9 QUESTIONS 1 & 2: 0
1. LITTLE INTEREST OR PLEASURE IN DOING THINGS: 0

## 2020-09-16 ASSESSMENT — LIFESTYLE VARIABLES: HOW OFTEN DO YOU HAVE A DRINK CONTAINING ALCOHOL: 0

## 2020-09-16 NOTE — PATIENT INSTRUCTIONS
Personalized Preventive Plan for Cindy Mancera - 9/16/2020  Medicare offers a range of preventive health benefits. Some of the tests and screenings are paid in full while other may be subject to a deductible, co-insurance, and/or copay. Some of these benefits include a comprehensive review of your medical history including lifestyle, illnesses that may run in your family, and various assessments and screenings as appropriate. After reviewing your medical record and screening and assessments performed today your provider may have ordered immunizations, labs, imaging, and/or referrals for you. A list of these orders (if applicable) as well as your Preventive Care list are included within your After Visit Summary for your review. Other Preventive Recommendations:    · A preventive eye exam performed by an eye specialist is recommended every 1-2 years to screen for glaucoma; cataracts, macular degeneration, and other eye disorders. · A preventive dental visit is recommended every 6 months. · Try to get at least 150 minutes of exercise per week or 10,000 steps per day on a pedometer . · Order or download the FREE \"Exercise & Physical Activity: Your Everyday Guide\" from [employer]. Call 8-921.297.5514 or search [employer] online. · You need 3514-7111 mg of calcium and 5013-8105 IU of vitamin D per day. It is possible to meet your calcium requirement with diet alone, but a vitamin D supplement is usually necessary to meet this goal.  · When exposed to the sun, use a sunscreen that protects against both UVA and UVB radiation with an SPF of 30 or greater. Reapply every 2 to 3 hours or after sweating, drying off with a towel, or swimming. · Always wear a seat belt when traveling in a car. Always wear a helmet when riding a bicycle or motorcycle.

## 2020-09-16 NOTE — PROGRESS NOTES
Medicare Annual Wellness Visit  Name: Yuniel Camargo Date: 2020   MRN: [id number] Sex: Female   Age: 65 y.o. Ethnicity: Non-/Non    : 1950 Race: Olivia Javed is here for Medicare AWV (no complaints )    Screenings for behavioral, psychosocial and functional/safety risks, and cognitive dysfunction are all negative except as indicated below. These results, as well as other patient data from the [email or web address] form, are documented in Flowsheets linked to this Encounter. Pulmonary emphysema, unspecified emphysema type ([address]  Cancer of trachea, bronchus, and lung ([facility]  - Per [facility]. Non-cancerous lesion.   - Follow-up per Pulmonology    Cardiomyopathy, unspecified type ([address]  PVD (peripheral vascular disease) with claudication ([address]  - Chronic. Stable. Asymptomatic. Follow-up per specialisits  Allergies   Allergen Reactions    Kenalog [Triamcinolone Acetonide] Other (See Comments)     Flushing of face after Rt knee injection 2020         Prior to Visit Medications    Medication Sig Taking?  Authorizing Provider   furosemide (LASIX) 40 MG tablet TAKE ONE TABLET BY MOUTH EVERY DAY  Historical Provider, MD   atorvastatin (LIPITOR) 40 MG tablet Take 40 mg by mouth daily   Historical Provider, MD   Handicap Placard MISC by Does not apply route Duration: Lifetime  Nu Hidalgo, DO   metoprolol tartrate (LOPRESSOR) 25 MG tablet Take 25 mg by mouth 2 times daily   Historical Provider, MD   spironolactone (ALDACTONE) 25 MG tablet Take 25 mg by mouth daily   Historical Provider, MD   dabigatran (PRADAXA) 150 MG capsule Take 150 mg by mouth 2 times daily   Historical Provider, MD         Past Medical History:   Diagnosis Date    Atrial fibrillation ([facility]     Dr. Getachew Singh [facility])     lung    Cardiomyopathy [facility])     Dr. Janeth Sahu, [address] CVA (cerebral vascular accident) [facility])         Hyperlipidemia     Hypertension     PVD (peripheral vascular disease) with claudication ([facility] 9/7/2016    Spider veins 9/7/2016    Varicose veins of both lower extremities 9/7/2016       Past Surgical History:   Procedure Laterality Date    BRONCHOSCOPY N/A 1/31/2020    BRONCHOSCOPY EBUS performed by Denisse Terrell MD at [facility] 1/31/2020    BRONCHOSCOPY [facility]IGATIONAL [facility] [address] [facility] [address]  1/31/2020    BRONCHOSCOPY ALVEOLAR LAVAGE [facility] [address] [facility] MD [address]  1/31/2020    BRONCHOSCOPY/TRANSBRONCHIAL NEEDLE BIOPSY [address] [facility] MD [address]  1/31/2020    BRONCHOSCOPY/TRANSBRONCHIAL NEEDLE BIOPSY ADDL LOBE performed by Denisse Terrell MD at [facility]  [address]  2017    ablation [facility]    COLONOSCOPY      [place]     TUBAL LIGATION           Family History   Problem Relation Age of Onset    Breast Cancer Mother     Diabetes Father        CareTeam (Including outside providers/suppliers regularly involved in providing care):   Patient Care Team:  Ynes Hidalgo DO as PCP - General (Family Medicine)  Ynes Hidalgo DO as PCP - [facility] Empaneled Provider    Wt Readings from Last 3 Encounters:   09/16/20 129 lb (58.5 kg)   07/01/20 126 lb (57.2 kg)   05/04/20 126 lb (57.2 kg)     Vitals:    09/16/20 1010   BP: 98/70   Pulse: 66   Resp: 18   Temp: 97.6 °F (36.4 °C)   SpO2: 92%   Weight: 129 lb (58.5 kg)   Height: 5' 6\" (1.676 m)     Body mass index is 20.82 kg/m². Based upon direct observation of the patient, evaluation of cognition reveals recent and remote memory intact.     General Appearance: alert and oriented to person, place and time, well developed and well- nourished, in no acute distress  Skin: warm and dry, no rash or erythema  Head: normocephalic and atraumatic  Eyes: pupils equal, round, and reactive to light, extraocular eye movements intact, conjunctivae normal  ENT: tympanic membrane, external ear and ear canal normal bilaterally, nose without deformity, nasal mucosa and turbinates normal without polyps  Neck: supple and non-tender without mass, no thyromegaly or thyroid nodules, no cervical lymphadenopathy  Pulmonary/Chest: clear to auscultation bilaterally- no wheezes, rales or rhonchi, normal air movement, no respiratory distress  Cardiovascular: normal rate, regular rhythm, normal S1 and S2, no murmurs, rubs, clicks, or gallops, distal pulses intact, no carotid bruits  Abdomen: soft, non-tender, non-distended, normal bowel sounds, no masses or organomegaly  Extremities: no cyanosis, clubbing or edema  Musculoskeletal: normal range of motion, no joint swelling, deformity or tenderness  Neurologic: reflexes normal and symmetric, no cranial nerve deficit, gait, coordination and speech normal    Patient's complete Health Risk Assessment and screening values have been reviewed and are found in Flowsheets. The following problems were reviewed today and where indicated follow up appointments were made and/or referrals ordered. Positive Risk Factor Screenings with Interventions:     General Health:  General  In general, how would you say your health is?: Very Good  In the past 7 days, have you experienced any of the following? New or Increased Pain, New or Increased Fatigue, Loneliness, Social Isolation, Stress or Anger?: None of These  Do you get the social and emotional support that you need?: (!) No  Do you have a Living Will?: (!) No  General Health Risk Interventions:  · No Living Will: patient declined    Health Habits/Nutrition:  Health Habits/Nutrition  Do you exercise for at least 20 minutes 2-3 times per week?: (!) No  Have you lost any weight without trying in the past 3 months?: No  Do you eat fewer than 2 meals per day?: No  Have you seen a dentist within the past year?: Yes  Body mass index is 20.82 kg/m².   Health Habits/Nutrition Interventions:  · Inadequate physical activity:  patient is not ready to increase his/her physical activity level at this time    Personalized Preventive Plan   Current Health Maintenance Status  Immunization History   Administered Date(s) Administered    Influenza Virus Vaccine 11/03/2011, 10/22/2012, 10/27/2015    Influenza, High Dose (Fluzone 65 yrs and older) 10/25/2017, 10/22/2018    Influenza, Triv, inactivated, subunit, adjuvanted, IM (Fluad 65 yrs and older) 10/17/2019    Pneumococcal Conjugate 13-valent ([id number]) 07/18/2017    Pneumococcal Polysaccharide (Pyncaguxr20) 09/05/2018    Tdap (Boostrix, Adacel) 09/04/2012        Health Maintenance   Topic Date Due    Hepatitis C screen  1950    Shingles Vaccine (1 of 2) 01/17/1998    Colon cancer screen colonoscopy  01/17/1998    DEXA (modify frequency per FRAX score)  01/17/2003    Annual Wellness Visit (AWV)  06/20/2019    Breast cancer screen  09/19/2019    Flu vaccine (1) 09/01/2020    Lipid screen  09/30/2020    Potassium monitoring  01/31/2021    Creatinine monitoring  01/31/2021    DTaP/Tdap/Td vaccine (2 - Td) 09/04/2022    Pneumococcal 65+ years Vaccine  Completed    Hepatitis A vaccine  Aged Out    Hepatitis B vaccine  Aged Out    Hib vaccine  Aged Out    Meningococcal (ACWY) vaccine  Aged Out     Recommendations for [employer] Due: see orders and patient instructions/AVS.  . Recommended screening schedule for the next 5-10 years is provided to the patient in written form: see Patient Instructions/AVS.    Jayden Malone was seen today for medicare awv. Diagnoses and all orders for this visit:    Routine general medical examination at a health care facility    Pulmonary emphysema, unspecified emphysema type ([address]  Cancer of trachea, bronchus, and lung ([address]  - Per [facility]. Non-cancerous lesion.   - Follow-up per Pulmonology    Cardiomyopathy, unspecified type ([address]  PVD (peripheral vascular disease) with claudication ([address]  - Chronic. Stable. Asymptomatic.  Follow-up per specialisits

## 2020-09-22 PROBLEM — C34.80: Status: ACTIVE | Noted: 2020-09-22

## 2020-09-22 PROBLEM — C33: Status: ACTIVE | Noted: 2020-09-22

## 2020-09-22 PROBLEM — J43.9 PULMONARY EMPHYSEMA (HCC): Status: ACTIVE | Noted: 2020-09-22

## 2020-10-13 ENCOUNTER — NURSE ONLY (OUTPATIENT)
Dept: FAMILY MEDICINE CLINIC | Age: 70
End: 2020-10-13
Payer: MEDICARE

## 2020-10-13 ENCOUNTER — HOSPITAL ENCOUNTER (OUTPATIENT)
Age: 70
Discharge: HOME | End: 2020-10-15
Payer: MEDICARE

## 2020-10-13 LAB
ALBUMIN SERPL-MCNC: 4.1 G/DL (ref 3.5–5.2)
ALP BLD-CCNC: 76 U/L (ref 35–104)
ALT SERPL-CCNC: 11 U/L (ref 0–32)
ANION GAP SERPL CALCULATED.3IONS-SCNC: 13 MMOL/L (ref 7–16)
AST SERPL-CCNC: 25 U/L (ref 0–31)
BILIRUB SERPL-MCNC: 0.7 MG/DL (ref 0–1.2)
BUN BLDV-MCNC: 19 MG/DL (ref 8–23)
CALCIUM SERPL-MCNC: 9.9 MG/DL (ref 8.6–10.2)
CHLORIDE BLD-SCNC: 103 MMOL/L (ref 98–107)
CHOLESTEROL, TOTAL: 118 MG/DL (ref 0–199)
CO2: 24 MMOL/L (ref 22–29)
CREAT SERPL-MCNC: 1.2 MG/DL (ref 0.5–1)
GFR AFRICAN AMERICAN: 53
GFR NON-AFRICAN AMERICAN: 44 ML/MIN/1.73
GLUCOSE BLD-MCNC: 75 MG/DL (ref 74–99)
HCT VFR BLD CALC: 39.9 % (ref 34–48)
HDLC SERPL-MCNC: 38 MG/DL
HEMOGLOBIN: 12.2 G/DL (ref 11.5–15.5)
LDL CHOLESTEROL CALCULATED: 63 MG/DL (ref 0–99)
MCH RBC QN AUTO: 29.4 PG (ref 26–35)
MCHC RBC AUTO-ENTMCNC: 30.6 % (ref 32–34.5)
MCV RBC AUTO: 96.1 FL (ref 80–99.9)
PDW BLD-RTO: 15.9 FL (ref 11.5–15)
PLATELET # BLD: 360 E9/L (ref 130–450)
PMV BLD AUTO: 9.4 FL (ref 7–12)
POTASSIUM SERPL-SCNC: 4.8 MMOL/L (ref 3.5–5)
RBC # BLD: 4.15 E12/L (ref 3.5–5.5)
SODIUM BLD-SCNC: 140 MMOL/L (ref 132–146)
TOTAL PROTEIN: 7.9 G/DL (ref 6.4–8.3)
TRIGL SERPL-MCNC: 85 MG/DL (ref 0–149)
VLDLC SERPL CALC-MCNC: 17 MG/DL
WBC # BLD: 9.5 E9/L (ref 4.5–11.5)

## 2020-10-13 PROCEDURE — 80053 COMPREHEN METABOLIC PANEL: CPT

## 2020-10-13 PROCEDURE — 85027 COMPLETE CBC AUTOMATED: CPT

## 2020-10-13 PROCEDURE — 90694 VACC AIIV4 NO PRSRV 0.5ML IM: CPT | Performed by: FAMILY MEDICINE

## 2020-10-13 PROCEDURE — 80061 LIPID PANEL: CPT

## 2020-10-13 PROCEDURE — G0008 ADMIN INFLUENZA VIRUS VAC: HCPCS | Performed by: FAMILY MEDICINE

## 2021-05-21 ENCOUNTER — OFFICE VISIT (OUTPATIENT)
Dept: ORTHOPEDIC SURGERY | Age: 71
End: 2021-05-21
Payer: MEDICARE

## 2021-05-21 VITALS — WEIGHT: 131 LBS | HEIGHT: 67 IN | BODY MASS INDEX: 20.56 KG/M2

## 2021-05-21 DIAGNOSIS — M17.11 PRIMARY OSTEOARTHRITIS OF RIGHT KNEE: Primary | ICD-10-CM

## 2021-05-21 DIAGNOSIS — M25.461 PAIN AND SWELLING OF KNEE, RIGHT: ICD-10-CM

## 2021-05-21 DIAGNOSIS — M25.561 PAIN AND SWELLING OF KNEE, RIGHT: ICD-10-CM

## 2021-05-21 DIAGNOSIS — M25.461 KNEE EFFUSION, RIGHT: ICD-10-CM

## 2021-05-21 PROCEDURE — 99212 OFFICE O/P EST SF 10 MIN: CPT | Performed by: NURSE PRACTITIONER

## 2021-05-21 NOTE — PROGRESS NOTES
Follow Up Visit     Greg Bennett returns today for follow up visit regarding her right knee pain and effusion. Treatment thus far has 5cc of alon fluid was aspirated last visit with pain relief. She noticed a month ago a return of swelling. She is here today to be evaluated. REVIEW OF SYSTEMS:     General: Negative Fever, chills, fatigue   Cardiovascular: Negative chest pain, palpitations  Respiratory: Equal chest expansion, negative shortness of breath   Skin: Negative rash, open wounds  Psych: Normal affect, mood stable  Neurologic: sensation grossly intact in extremities   Musculoskeletal: See HPI    Physical Exam:     Height: 5' 6.5\" (1.689 m), Weight: 131 lb (59.4 kg)    General: Alert and oriented x3, no acute distress  Cardiovascular/pulmonary: No labored breathing, peripheral perfusion intact  Musculoskeletal:    Right knee exam full range of motion 0-140, nontender to palpation. Mild swelling visualized. Patient has small palpable effusion versus soft tissue lipoma. Valgus varus exams are intact. Stable patella tracked midline. Stable knee on exam.    Controlled Substances Monitoring:      Imaging:  No new imaging obtained today. Previous x-ray showed mild to moderate degenerative changes. Assessment: Right knee swelling, soft tissue lipoma      Plan: Today we discussed her right knee. Patient states that she has had no pain to her right knee in several months. She has noticed mild swelling to the knee over the last couple months. She states it has not changed recently. On exam patient does have probable small soft tissue lipoma that is mobile nontender to touch. Patient states that she just wanted to make sure it was okay to have some swelling in her knee without pain. Discussed continuing to observe at this time. Patient will call to report if abnormal growth or pain develops. She will follow-up as needed.       Papo Rodriguez, [address] [facility]  Orthopedic Surgery   05/21/21  11:43 AM

## 2021-06-21 RX ORDER — ALBUTEROL SULFATE 90 UG/1
2 AEROSOL, METERED RESPIRATORY (INHALATION) 4 TIMES DAILY PRN
Qty: 3 INHALER | Refills: 1 | Status: SHIPPED | OUTPATIENT
Start: 2021-06-21

## 2021-06-21 NOTE — TELEPHONE ENCOUNTER
Patient went to [address] [place] [address] for copd and was given albuterol sulfate 90 mcg, she needs a refill of either it or something comparable sent to [facility] in [place] (patient has tried contacting [address] [place] [address] without prevail)

## 2021-08-09 ENCOUNTER — HOSPITAL ENCOUNTER (INPATIENT)
Age: 71
LOS: 3 days | Discharge: HOME | DRG: 378 | End: 2021-08-12
Attending: FAMILY MEDICINE | Admitting: FAMILY MEDICINE
Payer: MEDICARE

## 2021-08-09 ENCOUNTER — HOSPITAL ENCOUNTER (EMERGENCY)
Age: 71
Discharge: ANOTHER HOSPITAL | End: 2021-08-09
Attending: EMERGENCY MEDICINE
Payer: MEDICARE

## 2021-08-09 VITALS
WEIGHT: 130 LBS | HEIGHT: 66 IN | HEART RATE: 55 BPM | SYSTOLIC BLOOD PRESSURE: 100 MMHG | BODY MASS INDEX: 20.89 KG/M2 | DIASTOLIC BLOOD PRESSURE: 62 MMHG | OXYGEN SATURATION: 96 % | RESPIRATION RATE: 16 BRPM | TEMPERATURE: 98.6 F

## 2021-08-09 DIAGNOSIS — K92.2 GASTROINTESTINAL HEMORRHAGE, UNSPECIFIED GASTROINTESTINAL HEMORRHAGE TYPE: Primary | ICD-10-CM

## 2021-08-09 DIAGNOSIS — D62 ANEMIA DUE TO BLOOD LOSS, ACUTE: Primary | ICD-10-CM

## 2021-08-09 DIAGNOSIS — Z79.01 ANTICOAGULATED: ICD-10-CM

## 2021-08-09 PROBLEM — N18.9 ACUTE KIDNEY INJURY SUPERIMPOSED ON CKD (HCC): Status: ACTIVE | Noted: 2021-08-09

## 2021-08-09 PROBLEM — N17.9 ACUTE KIDNEY INJURY SUPERIMPOSED ON CKD (HCC): Status: ACTIVE | Noted: 2021-08-09

## 2021-08-09 PROBLEM — R00.1 BRADYCARDIA: Status: ACTIVE | Noted: 2021-08-09

## 2021-08-09 LAB
ALBUMIN SERPL-MCNC: 4.1 G/DL (ref 3.5–5.2)
ALP BLD-CCNC: 53 U/L (ref 35–104)
ALT SERPL-CCNC: 15 U/L (ref 0–32)
ANION GAP SERPL CALCULATED.3IONS-SCNC: 11 MMOL/L (ref 7–16)
AST SERPL-CCNC: 24 U/L (ref 0–31)
BASOPHILS ABSOLUTE: 0.05 E9/L (ref 0–0.2)
BASOPHILS RELATIVE PERCENT: 0.6 % (ref 0–2)
BILIRUB SERPL-MCNC: 1.2 MG/DL (ref 0–1.2)
BUN BLDV-MCNC: 31 MG/DL (ref 6–23)
CALCIUM SERPL-MCNC: 9.4 MG/DL (ref 8.6–10.2)
CHLORIDE BLD-SCNC: 105 MMOL/L (ref 98–107)
CHP ED QC CHECK: YES
CO2: 24 MMOL/L (ref 22–29)
CREAT SERPL-MCNC: 1.9 MG/DL (ref 0.5–1)
EOSINOPHILS ABSOLUTE: 0.23 E9/L (ref 0.05–0.5)
EOSINOPHILS RELATIVE PERCENT: 2.7 % (ref 0–6)
GFR AFRICAN AMERICAN: 31
GFR NON-AFRICAN AMERICAN: 26 ML/MIN/1.73
GLUCOSE BLD-MCNC: 102 MG/DL (ref 74–99)
HCT VFR BLD CALC: 29.3 % (ref 34–48)
HEMOCCULT STL QL: NEGATIVE
HEMOGLOBIN: 9.3 G/DL (ref 11.5–15.5)
IMMATURE GRANULOCYTES #: 0.04 E9/L
IMMATURE GRANULOCYTES %: 0.5 % (ref 0–5)
LYMPHOCYTES ABSOLUTE: 1.46 E9/L (ref 1.5–4)
LYMPHOCYTES RELATIVE PERCENT: 17.3 % (ref 20–42)
MCH RBC QN AUTO: 31.6 PG (ref 26–35)
MCHC RBC AUTO-ENTMCNC: 31.7 % (ref 32–34.5)
MCV RBC AUTO: 99.7 FL (ref 80–99.9)
MONOCYTES ABSOLUTE: 0.75 E9/L (ref 0.1–0.95)
MONOCYTES RELATIVE PERCENT: 8.9 % (ref 2–12)
NEUTROPHILS ABSOLUTE: 5.91 E9/L (ref 1.8–7.3)
NEUTROPHILS RELATIVE PERCENT: 70 % (ref 43–80)
PDW BLD-RTO: 16.9 FL (ref 11.5–15)
PLATELET # BLD: 262 E9/L (ref 130–450)
PMV BLD AUTO: 9.5 FL (ref 7–12)
POTASSIUM SERPL-SCNC: 4.3 MMOL/L (ref 3.5–5)
RBC # BLD: 2.94 E12/L (ref 3.5–5.5)
SODIUM BLD-SCNC: 140 MMOL/L (ref 132–146)
TOTAL PROTEIN: 7.4 G/DL (ref 6.4–8.3)
WBC # BLD: 8.4 E9/L (ref 4.5–11.5)

## 2021-08-09 PROCEDURE — 99284 EMERGENCY DEPT VISIT MOD MDM: CPT

## 2021-08-09 PROCEDURE — 93005 ELECTROCARDIOGRAM TRACING: CPT | Performed by: EMERGENCY MEDICINE

## 2021-08-09 PROCEDURE — 2580000003 HC RX 258: Performed by: FAMILY MEDICINE

## 2021-08-09 PROCEDURE — 36415 COLL VENOUS BLD VENIPUNCTURE: CPT

## 2021-08-09 PROCEDURE — 1200000000 HC SEMI PRIVATE

## 2021-08-09 PROCEDURE — 81001 URINALYSIS AUTO W/SCOPE: CPT

## 2021-08-09 PROCEDURE — 96374 THER/PROPH/DIAG INJ IV PUSH: CPT

## 2021-08-09 PROCEDURE — 80053 COMPREHEN METABOLIC PANEL: CPT

## 2021-08-09 PROCEDURE — 82570 ASSAY OF URINE CREATININE: CPT

## 2021-08-09 PROCEDURE — 85025 COMPLETE CBC W/AUTO DIFF WBC: CPT

## 2021-08-09 PROCEDURE — 84540 ASSAY OF URINE/UREA-N: CPT

## 2021-08-09 PROCEDURE — 6360000002 HC RX W HCPCS: Performed by: FAMILY MEDICINE

## 2021-08-09 PROCEDURE — 82436 ASSAY OF URINE CHLORIDE: CPT

## 2021-08-09 PROCEDURE — 84300 ASSAY OF URINE SODIUM: CPT

## 2021-08-09 PROCEDURE — C9113 INJ PANTOPRAZOLE SODIUM, VIA: HCPCS | Performed by: FAMILY MEDICINE

## 2021-08-09 PROCEDURE — 84133 ASSAY OF URINE POTASSIUM: CPT

## 2021-08-09 RX ORDER — AMIODARONE HYDROCHLORIDE 200 MG/1
200 TABLET ORAL DAILY
COMMUNITY

## 2021-08-09 RX ORDER — ACETAMINOPHEN 325 MG/1
650 TABLET ORAL EVERY 6 HOURS PRN
Status: DISCONTINUED | OUTPATIENT
Start: 2021-08-09 | End: 2021-08-12 | Stop reason: HOSPADM

## 2021-08-09 RX ORDER — ONDANSETRON 2 MG/ML
4 INJECTION INTRAMUSCULAR; INTRAVENOUS EVERY 6 HOURS PRN
Status: DISCONTINUED | OUTPATIENT
Start: 2021-08-09 | End: 2021-08-12 | Stop reason: HOSPADM

## 2021-08-09 RX ORDER — AMIODARONE HYDROCHLORIDE 200 MG/1
200 TABLET ORAL DAILY
Status: DISCONTINUED | OUTPATIENT
Start: 2021-08-10 | End: 2021-08-12 | Stop reason: HOSPADM

## 2021-08-09 RX ORDER — SODIUM CHLORIDE 9 MG/ML
25 INJECTION, SOLUTION INTRAVENOUS PRN
Status: DISCONTINUED | OUTPATIENT
Start: 2021-08-09 | End: 2021-08-12 | Stop reason: HOSPADM

## 2021-08-09 RX ORDER — ALBUTEROL SULFATE 2.5 MG/3ML
2.5 SOLUTION RESPIRATORY (INHALATION) EVERY 6 HOURS PRN
Status: DISCONTINUED | OUTPATIENT
Start: 2021-08-09 | End: 2021-08-12 | Stop reason: HOSPADM

## 2021-08-09 RX ORDER — ACETAMINOPHEN 650 MG/1
650 SUPPOSITORY RECTAL EVERY 6 HOURS PRN
Status: DISCONTINUED | OUTPATIENT
Start: 2021-08-09 | End: 2021-08-12 | Stop reason: HOSPADM

## 2021-08-09 RX ORDER — SODIUM CHLORIDE 0.9 % (FLUSH) 0.9 %
5-40 SYRINGE (ML) INJECTION PRN
Status: DISCONTINUED | OUTPATIENT
Start: 2021-08-09 | End: 2021-08-12 | Stop reason: HOSPADM

## 2021-08-09 RX ORDER — PANTOPRAZOLE SODIUM 40 MG/10ML
40 INJECTION, POWDER, LYOPHILIZED, FOR SOLUTION INTRAVENOUS 2 TIMES DAILY
Status: DISCONTINUED | OUTPATIENT
Start: 2021-08-09 | End: 2021-08-10

## 2021-08-09 RX ORDER — ONDANSETRON 4 MG/1
4 TABLET, ORALLY DISINTEGRATING ORAL EVERY 8 HOURS PRN
Status: DISCONTINUED | OUTPATIENT
Start: 2021-08-09 | End: 2021-08-12 | Stop reason: HOSPADM

## 2021-08-09 RX ORDER — SODIUM CHLORIDE 9 MG/ML
INJECTION, SOLUTION INTRAVENOUS CONTINUOUS
Status: DISCONTINUED | OUTPATIENT
Start: 2021-08-09 | End: 2021-08-11

## 2021-08-09 RX ORDER — SODIUM CHLORIDE 0.9 % (FLUSH) 0.9 %
5-40 SYRINGE (ML) INJECTION EVERY 12 HOURS SCHEDULED
Status: DISCONTINUED | OUTPATIENT
Start: 2021-08-09 | End: 2021-08-12 | Stop reason: HOSPADM

## 2021-08-09 RX ORDER — ATORVASTATIN CALCIUM 40 MG/1
40 TABLET, FILM COATED ORAL DAILY
Status: DISCONTINUED | OUTPATIENT
Start: 2021-08-10 | End: 2021-08-12 | Stop reason: HOSPADM

## 2021-08-09 RX ADMIN — PANTOPRAZOLE SODIUM 40 MG: 40 INJECTION, POWDER, FOR SOLUTION INTRAVENOUS at 22:24

## 2021-08-09 RX ADMIN — SODIUM CHLORIDE: 9 INJECTION, SOLUTION INTRAVENOUS at 22:21

## 2021-08-09 RX ADMIN — SODIUM CHLORIDE, PRESERVATIVE FREE 10 ML: 5 INJECTION INTRAVENOUS at 22:22

## 2021-08-09 ASSESSMENT — ENCOUNTER SYMPTOMS
SORE THROAT: 0
VOMITING: 0
DIARRHEA: 0
NAUSEA: 0
SINUS PAIN: 0
SHORTNESS OF BREATH: 1
BACK PAIN: 0
WHEEZING: 0
ABDOMINAL PAIN: 0
ANAL BLEEDING: 1

## 2021-08-09 NOTE — ED PROVIDER NOTES
HPI:  8/9/21, Time: 2:56 PM EDT         Iftikhar Baez is a 66 y.o. female presenting to the ED for rectal bleeding, beginning last week ago. The complaint has been intermittent, moderate in severity, and worsened by nothing. Patient reports that she has had intermittent bright red blood per rectum with intermittent loose foul-smelling stool. She denies abdominal pain, recent antibiotic use or travel. She denies nausea or vomiting. She is on Pradaxa for irregular heart rhythm. She reports this happened to her once years ago and had a colonoscopy that was negative and was told it was a bleeding hemorrhoid. She reports she does have an external hemorrhoid that is somewhat tender at this time. She denies chest pain, shortness of breath, bleeding elsewhere, dizziness or fatigue. Patient denies fever/chills, sore throat, cough, congestion, chest pain, shortness of breath, edema, headache, visual disturbances, focal paresthesias, focal weakness, abdominal pain, nausea, vomiting, diarrhea, constipation, dysuria, hematuria, trauma, neck or back pain, rash or other complaints. ROS:   A complete review of systems was performed and all pertinent positives and negatives are stated within HPI, all other systems reviewed and are negative.      --------------------------------------------- PAST HISTORY ---------------------------------------------  Past Medical History:  has a past medical history of Atrial fibrillation ([facility] Cancer ([facility] Cardiomyopathy ([facility] CVA (cerebral vascular accident) ([facility] Hyperlipidemia, Hypertension, PVD (peripheral vascular disease) with claudication ([facility] Spider veins, and Varicose veins of both lower extremities. Past Surgical History:  has a past surgical history that includes Colonoscopy;  Tubal ligation; Cardiac surgery (2017); bronchoscopy (N/A, 1/31/2020); bronchoscopy (N/A, 1/31/2020); bronchoscopy (1/31/2020); bronchoscopy (1/31/2020); and bronchoscopy (1/31/2020). Social History:  reports that she quit smoking about 9 years ago. Her smoking use included cigarettes. She has a 20.00 pack-year smoking history. She quit smokeless tobacco use about 9 years ago. She reports that she does not drink alcohol and does not use drugs. Family History: family history includes Breast Cancer in her mother; Diabetes in her father. The patients home medications have been reviewed. Allergies: Kenalog [triamcinolone acetonide]        ----------------------------------------PHYSICAL EXAM--------------------------------------  Constitutional:  Well developed, well nourished, no acute distress, non-toxic appearance   Eyes:  PERRL, conjunctiva normal, EOMI  HENT:  Atraumatic, external ears normal, nose normal, oropharynx moist. Neck- normal range of motion, no nuchal rigidity   Respiratory:  No respiratory distress, normal breath sounds, no rales, no wheezing   Cardiovascular:  Normal rate, normal rhythm, no murmurs, no gallops, no rubs. Radial and DP pulses 2+ bilaterally. Lateral varicose veins noted. External cardiac monitor noted on left anterior chest wall. GI:  Soft, nondistended, normal bowel sounds, nontender, no organomegaly, no mass, no rebound, no guarding   :  No costovertebral angle tenderness   Rectal: Small external hemorrhoid that is not thrombosed tender or bleeding. Soft brown stool that is guaiac positive. Musculoskeletal:  No edema, no tenderness, no deformities. Back- no tenderness  Integument:  Well hydrated, no visible rash. Adequate perfusion. Neurologic:  Alert & oriented x 3, CN 2-12 normal, no focal deficits noted. Normal gait. Small hematoma noted on medial right tibial area. Psychiatric:  Speech and behavior appropriate       -------------------------------------------------- RESULTS -------------------------------------------------  I have personally reviewed all laboratory and imaging results for this patient.  Results are listed below.     LABS:  Results for orders placed or performed during the hospital encounter of 08/09/21   CBC auto differential   Result Value Ref Range    WBC 8.4 4.5 - 11.5 E9/L    RBC 2.94 (L) 3.50 - 5.50 E12/L    Hemoglobin 9.3 (L) 11.5 - 15.5 g/dL    Hematocrit 29.3 (L) 34.0 - 48.0 %    MCV 99.7 80.0 - 99.9 fL    MCH 31.6 26.0 - 35.0 pg    MCHC 31.7 (L) 32.0 - 34.5 %    RDW 16.9 (H) 11.5 - 15.0 fL    Platelets 943 779 - 551 E9/L    MPV 9.5 7.0 - 12.0 fL    Neutrophils % 70.0 43.0 - 80.0 %    Immature Granulocytes % 0.5 0.0 - 5.0 %    Lymphocytes % 17.3 (L) 20.0 - 42.0 %    Monocytes % 8.9 2.0 - 12.0 %    Eosinophils % 2.7 0.0 - 6.0 %    Basophils % 0.6 0.0 - 2.0 %    Neutrophils Absolute 5.91 1.80 - 7.30 E9/L    Immature Granulocytes # 0.04 E9/L    Lymphocytes Absolute 1.46 (L) 1.50 - 4.00 E9/L    Monocytes Absolute 0.75 0.10 - 0.95 E9/L    Eosinophils Absolute 0.23 0.05 - 0.50 E9/L    Basophils Absolute 0.05 0.00 - 0.20 E9/L   Comprehensive Metabolic Panel   Result Value Ref Range    Sodium 140 132 - 146 mmol/L    Potassium 4.3 3.5 - 5.0 mmol/L    Chloride 105 98 - 107 mmol/L    CO2 24 22 - 29 mmol/L    Anion Gap 11 7 - 16 mmol/L    Glucose 102 (H) 74 - 99 mg/dL    BUN 31 (H) 6 - 23 mg/dL    CREATININE 1.9 (H) 0.5 - 1.0 mg/dL    GFR Non-African American 26 >=60 mL/min/1.73    GFR African American 31     Calcium 9.4 8.6 - 10.2 mg/dL    Total Protein 7.4 6.4 - 8.3 g/dL    Albumin 4.1 3.5 - 5.2 g/dL    Total Bilirubin 1.2 0.0 - 1.2 mg/dL    Alkaline Phosphatase 53 35 - 104 U/L    ALT 15 0 - 32 U/L    AST 24 0 - 31 U/L   EKG 12 Lead   Result Value Ref Range    Ventricular Rate 56 BPM    Atrial Rate 56 BPM    P-R Interval 156 ms    QRS Duration 106 ms    Q-T Interval 462 ms    QTc Calculation (Bazett) 445 ms    P Axis 83 degrees    R Axis -28 degrees    T Axis 119 degrees       RADIOLOGY:  Interpreted by Radiologist.  No orders to display         EKG Interpretation  Time: 3:14 PM EDT  Rhythm: sinus bradycardia  Rate: 56  Axis: normal  Conduction: LVH  ST Segments: no acute change  T Waves: no acute change  Clinical Impression: left ventricular hypertrophy and sinus bradycardia  Comparison to prior EKG: stable as compared to patient's most recent EKG      ------------------------- NURSING NOTES AND VITALS REVIEWED ---------------------------  The nursing notes within the ED encounter and vital signs as below have been reviewed by myself. BP (!) 100/40   Pulse 58   Temp 96.9 °F (36.1 °C) (Temporal)   Resp 14   Ht 5' 6\" (1.676 m)   Wt 130 lb (59 kg)   SpO2 98%   BMI 20.98 kg/m²   Oxygen Saturation Interpretation: Normal      The patients available past medical records and past encounters were reviewed. ------------------------------ ED COURSE/MEDICAL DECISION MAKING----------------------  Medications - No data to display        Procedures:   none      Medical Decision Making:    HGB drop from 12. -->9.3, symptomatic, states she is normally winded, but that has increased lately and normally has labs that are WNL (has never been anemic). On Pradaxa, continues to have bleeding. BP low 100/40 but sinus bradycardia. This patient's ED course included: re-evaluation prior to disposition, cardiac monitoring, continuous pulse oximetry and a personal history and physicial eaxmination    This patient has remained hemodynamically stable, remained unchanged and been closely monitored during their ED course. Re-Evaluations:  Time: 5:59 PM EDT   Re-evaluation. Patients symptoms show no change  Repeat physical examination is not changed      Consultations:   5:57 PM EDT  Spoke with Dr Kallie Bartholomew, PCP, discussed case, agrees with admission for colonosocpy. Would like her to be seen by Dr Kimmie Morrison.  Admit to family practice  7:23 PM EDT  Spoke with Dr Bakari Tapia, family practice resident, discussed case, accepts admission for Dr Keara Vanegas to [facility] to med/surg med with tele  7:33 PM EDT  Spoke with Dr Henry Rondon, general surgery, discussed case, accepts consult. Critical Care: none    I, Jhonatan Randolph, DO, am the Primary Provider of Record    Counseling: The emergency provider has spoken with the patient and spouse/SO and discussed todays results, in addition to providing specific details for the plan of care and counseling regarding the diagnosis and prognosis. Questions are answered at this time and they are agreeable with the plan.    --------------------------- IMPRESSION AND DISPOSITION ---------------------------------    IMPRESSION  1. Gastrointestinal hemorrhage, unspecified gastrointestinal hemorrhage type    2. Anticoagulated        DISPOSITION  Disposition: Transfer to [facility] to med/surg with tele.   Patient condition is stable              Nils Camarena DO  08/09/21 [facility]  08/09/21 [id number]

## 2021-08-10 ENCOUNTER — ANESTHESIA EVENT (OUTPATIENT)
Dept: ENDOSCOPY | Age: 71
DRG: 378 | End: 2021-08-10
Payer: MEDICARE

## 2021-08-10 ENCOUNTER — ANESTHESIA (OUTPATIENT)
Dept: ENDOSCOPY | Age: 71
DRG: 378 | End: 2021-08-10
Payer: MEDICARE

## 2021-08-10 VITALS
OXYGEN SATURATION: 100 % | DIASTOLIC BLOOD PRESSURE: 57 MMHG | SYSTOLIC BLOOD PRESSURE: 113 MMHG | RESPIRATION RATE: 19 BRPM

## 2021-08-10 LAB
ANION GAP SERPL CALCULATED.3IONS-SCNC: 6 MMOL/L (ref 7–16)
BACTERIA: ABNORMAL /HPF
BASOPHILS ABSOLUTE: 0.05 E9/L (ref 0–0.2)
BASOPHILS RELATIVE PERCENT: 0.7 % (ref 0–2)
BILIRUBIN URINE: NEGATIVE
BLOOD, URINE: NEGATIVE
BUN BLDV-MCNC: 30 MG/DL (ref 6–23)
CALCIUM SERPL-MCNC: 8.7 MG/DL (ref 8.6–10.2)
CHLORIDE BLD-SCNC: 108 MMOL/L (ref 98–107)
CHLORIDE URINE RANDOM: <20 MMOL/L
CLARITY: CLEAR
CO2: 25 MMOL/L (ref 22–29)
COLOR: YELLOW
CREAT SERPL-MCNC: 1.8 MG/DL (ref 0.5–1)
CREATININE URINE: 185 MG/DL (ref 29–226)
EKG ATRIAL RATE: 56 BPM
EKG P AXIS: 83 DEGREES
EKG P-R INTERVAL: 156 MS
EKG Q-T INTERVAL: 462 MS
EKG QRS DURATION: 106 MS
EKG QTC CALCULATION (BAZETT): 445 MS
EKG R AXIS: -28 DEGREES
EKG T AXIS: 119 DEGREES
EKG VENTRICULAR RATE: 56 BPM
EOSINOPHILS ABSOLUTE: 0.23 E9/L (ref 0.05–0.5)
EOSINOPHILS RELATIVE PERCENT: 3.3 % (ref 0–6)
EPITHELIAL CELLS, UA: ABNORMAL /HPF
FERRITIN: 71 NG/ML
GFR AFRICAN AMERICAN: 33
GFR NON-AFRICAN AMERICAN: 28 ML/MIN/1.73
GLUCOSE BLD-MCNC: 86 MG/DL (ref 74–99)
GLUCOSE URINE: NEGATIVE MG/DL
HCT VFR BLD CALC: 25.2 % (ref 34–48)
HCT VFR BLD CALC: 27.8 % (ref 34–48)
HCT VFR BLD CALC: 28.4 % (ref 34–48)
HEMOGLOBIN: 7.8 G/DL (ref 11.5–15.5)
HEMOGLOBIN: 8.4 G/DL (ref 11.5–15.5)
HEMOGLOBIN: 8.7 G/DL (ref 11.5–15.5)
IMMATURE GRANULOCYTES #: 0.03 E9/L
IMMATURE GRANULOCYTES %: 0.4 % (ref 0–5)
INR BLD: 1.3
IRON SATURATION: 17 % (ref 15–50)
IRON: 58 MCG/DL (ref 37–145)
KETONES, URINE: NEGATIVE MG/DL
LEUKOCYTE ESTERASE, URINE: ABNORMAL
LYMPHOCYTES ABSOLUTE: 1.74 E9/L (ref 1.5–4)
LYMPHOCYTES RELATIVE PERCENT: 24.7 % (ref 20–42)
MCH RBC QN AUTO: 31.7 PG (ref 26–35)
MCHC RBC AUTO-ENTMCNC: 31 % (ref 32–34.5)
MCV RBC AUTO: 102.4 FL (ref 80–99.9)
MONOCYTES ABSOLUTE: 0.7 E9/L (ref 0.1–0.95)
MONOCYTES RELATIVE PERCENT: 9.9 % (ref 2–12)
NEUTROPHILS ABSOLUTE: 4.3 E9/L (ref 1.8–7.3)
NEUTROPHILS RELATIVE PERCENT: 61 % (ref 43–80)
NITRITE, URINE: NEGATIVE
PDW BLD-RTO: 17 FL (ref 11.5–15)
PH UA: 6 (ref 5–9)
PLATELET # BLD: 208 E9/L (ref 130–450)
PMV BLD AUTO: 9.4 FL (ref 7–12)
POTASSIUM REFLEX MAGNESIUM: 4.1 MMOL/L (ref 3.5–5)
POTASSIUM, UR: 52.1 MMOL/L
PROTEIN UA: NEGATIVE MG/DL
PROTHROMBIN TIME: 14.3 SEC (ref 9.3–12.4)
RBC # BLD: 2.46 E12/L (ref 3.5–5.5)
RBC UA: ABNORMAL /HPF (ref 0–2)
SODIUM BLD-SCNC: 139 MMOL/L (ref 132–146)
SODIUM URINE: 44 MMOL/L
SPECIFIC GRAVITY UA: 1.02 (ref 1–1.03)
TOTAL IRON BINDING CAPACITY: 340 MCG/DL (ref 250–450)
UREA NITROGEN, UR: 1027 MG/DL (ref 800–1666)
UROBILINOGEN, URINE: 0.2 E.U./DL
WBC # BLD: 7.1 E9/L (ref 4.5–11.5)
WBC UA: ABNORMAL /HPF (ref 0–5)

## 2021-08-10 PROCEDURE — 85610 PROTHROMBIN TIME: CPT

## 2021-08-10 PROCEDURE — 80048 BASIC METABOLIC PNL TOTAL CA: CPT

## 2021-08-10 PROCEDURE — 6370000000 HC RX 637 (ALT 250 FOR IP): Performed by: SURGERY

## 2021-08-10 PROCEDURE — 3609017100 HC EGD: Performed by: SURGERY

## 2021-08-10 PROCEDURE — 83540 ASSAY OF IRON: CPT

## 2021-08-10 PROCEDURE — 7100000010 HC PHASE II RECOVERY - FIRST 15 MIN: Performed by: SURGERY

## 2021-08-10 PROCEDURE — 85018 HEMOGLOBIN: CPT

## 2021-08-10 PROCEDURE — 83550 IRON BINDING TEST: CPT

## 2021-08-10 PROCEDURE — 85014 HEMATOCRIT: CPT

## 2021-08-10 PROCEDURE — 36415 COLL VENOUS BLD VENIPUNCTURE: CPT

## 2021-08-10 PROCEDURE — 6360000002 HC RX W HCPCS: Performed by: NURSE ANESTHETIST, CERTIFIED REGISTERED

## 2021-08-10 PROCEDURE — 3700000000 HC ANESTHESIA ATTENDED CARE: Performed by: SURGERY

## 2021-08-10 PROCEDURE — 7100000011 HC PHASE II RECOVERY - ADDTL 15 MIN: Performed by: SURGERY

## 2021-08-10 PROCEDURE — 2580000003 HC RX 258: Performed by: SURGERY

## 2021-08-10 PROCEDURE — 0DJ08ZZ INSPECTION OF UPPER INTESTINAL TRACT, VIA NATURAL OR ARTIFICIAL OPENING ENDOSCOPIC: ICD-10-PCS | Performed by: SURGERY

## 2021-08-10 PROCEDURE — 93010 ELECTROCARDIOGRAM REPORT: CPT | Performed by: INTERNAL MEDICINE

## 2021-08-10 PROCEDURE — 85025 COMPLETE CBC W/AUTO DIFF WBC: CPT

## 2021-08-10 PROCEDURE — 2500000003 HC RX 250 WO HCPCS: Performed by: NURSE ANESTHETIST, CERTIFIED REGISTERED

## 2021-08-10 PROCEDURE — 99222 1ST HOSP IP/OBS MODERATE 55: CPT | Performed by: FAMILY MEDICINE

## 2021-08-10 PROCEDURE — 3700000001 HC ADD 15 MINUTES (ANESTHESIA): Performed by: SURGERY

## 2021-08-10 PROCEDURE — 1200000000 HC SEMI PRIVATE

## 2021-08-10 PROCEDURE — 2709999900 HC NON-CHARGEABLE SUPPLY: Performed by: SURGERY

## 2021-08-10 PROCEDURE — 82728 ASSAY OF FERRITIN: CPT

## 2021-08-10 RX ORDER — PROPOFOL 10 MG/ML
INJECTION, EMULSION INTRAVENOUS PRN
Status: DISCONTINUED | OUTPATIENT
Start: 2021-08-10 | End: 2021-08-10 | Stop reason: SDUPTHER

## 2021-08-10 RX ORDER — LIDOCAINE HYDROCHLORIDE 10 MG/ML
INJECTION, SOLUTION EPIDURAL; INFILTRATION; INTRACAUDAL; PERINEURAL PRN
Status: DISCONTINUED | OUTPATIENT
Start: 2021-08-10 | End: 2021-08-10 | Stop reason: SDUPTHER

## 2021-08-10 RX ADMIN — AMIODARONE HYDROCHLORIDE 200 MG: 200 TABLET ORAL at 10:18

## 2021-08-10 RX ADMIN — PROPOFOL 70 MG: 10 INJECTION, EMULSION INTRAVENOUS at 07:59

## 2021-08-10 RX ADMIN — ATORVASTATIN CALCIUM 40 MG: 40 TABLET, FILM COATED ORAL at 10:19

## 2021-08-10 RX ADMIN — BISACODYL 10 MG: 5 TABLET, COATED ORAL at 16:15

## 2021-08-10 RX ADMIN — BISACODYL 10 MG: 5 TABLET, COATED ORAL at 12:53

## 2021-08-10 RX ADMIN — MAGNESIUM CITRATE 592 ML: 1.75 LIQUID ORAL at 12:53

## 2021-08-10 RX ADMIN — LIDOCAINE HYDROCHLORIDE 20 MG: 10 INJECTION, SOLUTION EPIDURAL; INFILTRATION; INTRACAUDAL; PERINEURAL at 07:59

## 2021-08-10 RX ADMIN — MAGNESIUM CITRATE 592 ML: 1.75 LIQUID ORAL at 16:15

## 2021-08-10 RX ADMIN — SODIUM CHLORIDE: 9 INJECTION, SOLUTION INTRAVENOUS at 20:39

## 2021-08-10 ASSESSMENT — COPD QUESTIONNAIRES
CAT_SEVERITY: MODERATE
CAT_SEVERITY: MODERATE

## 2021-08-10 ASSESSMENT — ENCOUNTER SYMPTOMS
ABDOMINAL PAIN: 0
NAUSEA: 0
SHORTNESS OF BREATH: 1
ANAL BLEEDING: 1
SHORTNESS OF BREATH: 1
COUGH: 0
SHORTNESS OF BREATH: 1

## 2021-08-10 ASSESSMENT — PAIN SCALES - GENERAL
PAINLEVEL_OUTOF10: 0

## 2021-08-10 NOTE — H&P
breath. Negative for wheezing. Cardiovascular: Negative for chest pain, palpitations and leg swelling. Gastrointestinal: Positive for anal bleeding. Negative for abdominal pain, diarrhea, nausea and vomiting. Genitourinary: Negative for dysuria, frequency, hematuria and urgency. Musculoskeletal: Negative for back pain and joint swelling. Skin: Negative for rash. Neurological: Negative for dizziness, light-headedness and numbness. Psychiatric/Behavioral: Negative for sleep disturbance. Past Medical History:   Diagnosis Date    Atrial fibrillation ([facility]     Dr. Poole [facility]) 2020    lung    Cardiomyopathy [facility])     Dr. Kimberly Escobedo, [address] CVA (cerebral vascular accident) [facility])     2014    Hyperlipidemia     Hypertension     PVD [facility]peripheral vascular disease) [facility] 9/7/2016    Spider veins 9/7/2016    Varicose veins of both lower extremities 9/7/2016         Past Surgical History:   Procedure Laterality Date    BRONCHOSCOPY N/A 1/31/2020    BRONCHOSCOPY EBUS performed by He Hanna MD at [facility] 1/31/2020    BRONCHOSCOPY NAVIGATIONAL performed by He Hanna MD at [address] [place] [address]  1/31/2020    BRONCHOSCOPY ALVEOLAR LAVAGE performed by He Hanna MD at [address] [place] [address]  1/31/2020    BRONCHOSCOPY/TRANSBRONCHIAL NEEDLE BIOPSY performed by He Hanna MD at [address] [place] [address]  1/31/2020    BRONCHOSCOPY/TRANSBRONCHIAL NEEDLE BIOPSY ADDL LOBE performed by He Hanna MD at [place] [address]  2017    [place] [id number] [place] [address]         Medications Prior to Admission:    Prior to Admission medications    Medication Sig Start Date End Date Taking?  Authorizing Provider   rivaroxaban (XARELTO) 20 MG TABS tablet Take 20 mg by mouth   Yes Historical Provider, MD   amiodarone (CORDARONE) 200 MG tablet Take 200 mg by mouth daily   Yes Historical Provider, MD   albuterol sulfate HFA (VENTOLIN HFA) 108 (90 Base) MCG/ACT inhaler Inhale 2 puffs into the lungs 4 times daily as needed for Wheezing 6/21/21  Yes Nu Hidalgo DO   furosemide (LASIX) 40 MG tablet TAKE ONE TABLET BY MOUTH EVERY DAY 9/7/19  Yes Historical Provider, MD   atorvastatin (LIPITOR) 40 MG tablet Take 40 mg by mouth daily  8/8/19  Yes Historical Provider, MD   metoprolol tartrate (LOPRESSOR) 25 MG tablet Take 25 mg by mouth 2 times daily  12/23/17  Yes Historical Provider, MD   spironolactone (ALDACTONE) 25 MG tablet Take 25 mg by mouth daily  1/12/18  Yes Historical Provider, MD   Hakeem Solorzano [address] [facility] by Does not apply route Duration: Lifetime 9/5/18   Cathryn Hidalgo DO        Allergies:   Kenalog [triamcinolone acetonide]    Social History:    reports that she quit smoking about 9 years ago. Her smoking use included cigarettes. She has a 20.00 pack-year smoking history. She quit smokeless tobacco use about 9 years ago. She reports that she does not drink alcohol and does not use drugs. Family History:   family history includes Breast Cancer in her mother; Diabetes in her father. PHYSICAL EXAM:    Vitals:  BP (!) 156/70   Pulse (!) 45   Temp 97.8 °F (36.6 °C) (Oral)   Resp 16   Wt 128 lb 8 oz (58.3 kg)   SpO2 92%   BMI 20.74 kg/m²     Physical Exam  Vitals reviewed. Constitutional:       General: She is not in acute distress. Appearance: Normal appearance. She is not ill-appearing, toxic-appearing or diaphoretic. HENT:      Head: Normocephalic and atraumatic. Eyes:      Conjunctiva/sclera: Conjunctivae normal.      Pupils: Pupils are equal, round, and reactive to light. Cardiovascular:      Rate and Rhythm: Regular rhythm. Bradycardia present. Heart sounds: Normal heart sounds. No murmur heard. Pulmonary:      Effort: Pulmonary effort is normal. No respiratory distress. Breath sounds: Normal breath sounds. No wheezing. Abdominal:      General: Bowel sounds are normal. There is no distension. Palpations: Abdomen is soft. There is no mass. Tenderness: There is no abdominal tenderness. There is no guarding. Genitourinary:     Rectum: Guaiac result positive (Performed in ER). Musculoskeletal:         General: No swelling or tenderness. Normal range of motion. Cervical back: Normal range of motion. No rigidity. Skin:     General: Skin is warm and dry. Capillary Refill: Capillary refill takes less than 2 seconds. Coloration: Skin is not jaundiced or pale. Findings: No bruising. Neurological:      General: No focal deficit present. Mental Status: She is alert and oriented to person, place, and time. Cranial Nerves: No cranial nerve deficit. Motor: No weakness.    Psychiatric:         Mood and Affect: Mood normal.         LABS:  Recent Results (from the past 24 hour(s))   CBC auto differential    Collection Time: 08/09/21  2:59 PM   Result Value Ref Range    WBC 8.4 4.5 - 11.5 E9/L    RBC 2.94 (L) 3.50 - 5.50 E12/L    Hemoglobin 9.3 (L) 11.5 - 15.5 g/dL    Hematocrit 29.3 (L) 34.0 - 48.0 %    MCV 99.7 80.0 - 99.9 fL    MCH 31.6 26.0 - 35.0 pg    MCHC 31.7 (L) 32.0 - 34.5 %    RDW 16.9 (H) 11.5 - 15.0 fL    Platelets 722 893 - 495 E9/L    MPV 9.5 7.0 - 12.0 fL    Neutrophils % 70.0 43.0 - 80.0 %    Immature Granulocytes % 0.5 0.0 - 5.0 %    Lymphocytes % 17.3 (L) 20.0 - 42.0 %    Monocytes % 8.9 2.0 - 12.0 %    Eosinophils % 2.7 0.0 - 6.0 %    Basophils % 0.6 0.0 - 2.0 %    Neutrophils Absolute 5.91 1.80 - 7.30 E9/L    Immature Granulocytes # 0.04 E9/L    Lymphocytes Absolute 1.46 (L) 1.50 - 4.00 E9/L    Monocytes Absolute 0.75 0.10 - 0.95 E9/L    Eosinophils Absolute 0.23 0.05 - 0.50 E9/L    Basophils Absolute 0.05 0.00 - 0.20 E9/L   Comprehensive Metabolic Panel    Collection Time: 08/09/21  2:59 PM   Result Value Ref Range    Sodium 140 132 - 146 mmol/L    Potassium 4.3 3.5 - 5.0 mmol/L    Chloride 105 98 - 107 mmol/L    CO2 24 22 - 29 mmol/L    Anion Gap 11 7 - 16 mmol/L    Glucose 102 (H) 74 - 99 mg/dL    BUN 31 (H) 6 - 23 mg/dL    CREATININE 1.9 (H) 0.5 - 1.0 mg/dL    GFR Non-African American 26 >=60 mL/min/1.73    GFR African American 31     Calcium 9.4 8.6 - 10.2 mg/dL    Total Protein 7.4 6.4 - 8.3 g/dL    Albumin 4.1 3.5 - 5.2 g/dL    Total Bilirubin 1.2 0.0 - 1.2 mg/dL    Alkaline Phosphatase 53 35 - 104 U/L    ALT 15 0 - 32 U/L    AST 24 0 - 31 U/L   EKG 12 Lead    Collection Time: 08/09/21  3:14 PM   Result Value Ref Range    Ventricular Rate 56 BPM    Atrial Rate 56 BPM    P-R Interval 156 ms    QRS Duration 106 ms    Q-T Interval 462 ms    QTc Calculation (Bazett) 445 ms    P Axis 83 degrees    R Axis -28 degrees    T Axis 119 degrees   POCT occult blood stool    Collection Time: 08/09/21  6:00 PM   Result Value Ref Range    OCCULT BLOOD FECAL negative     QC OK?  yes        No orders to display       ASSESSMENT/PLAN:      Active Hospital Problems    Diagnosis Date Noted    GI bleed [K92.2] 08/09/2021    Acute kidney injury superimposed on CKD ([facility] [N17.9, N18.9] 08/09/2021    Bradycardia [R00.1] 08/09/2021    Hyperlipidemia [E78.5]     Atrial fibrillation (HCC) [I48.91]      GI Bleed  Given hemmocult positive stool and ongoing rectal bleeding presume lower GI source but may be mixed component given LAISHA  - Protonix 40mg IV BID  - Clears, NPO after Midnight  - Iron studies  - Trend H/h q6h, transfuse below 7  - Consult Gen Surgery - Dr. Idania Smith per PCP request  - Last Cscope multiple years ago only remarkable for hemorrhoid    LAISHA on CKD  Cr elevated to 1.9 from baseline of around 1.2  Presume decreased effective renal perfusion source given ongoing GI losses  - Check Urine studies and FeUrea  - Gentle hydration, caution with excess fluids given history of diastolic HF  - Hold Lasix and Spironolaction    Bradycardia  Appears to be near baseline with HR in the 50s in ER although initial vitals inpatient documented HR of 45. Documented history of Afib. Did take a dose of Lopressor on route to [facility] from [address]  - Telemetry monitoring  - Consider further workup if further decrease or change in symptoms, may need to hold Lopressor. Hx of CVA  Hold anticoagulation for now given Bleeding risk    Hx of Afib  Currently sinus Bradycardic  On Rate control, Lopressor and Amiodarone. Holding anticoagulation    HLD  Continue Statin    Hx of Pulmonary Emphysema  Cont PRN Albuterol, not currently on controller    DVT ppx: PCDs- Hold Anticoag  GI ppx: Protonix 40mg BID  Code Status: Full        Case discussed with attending on call Dr. Bruno Colin.     Susan Patrick MD MD  Family Medicine Resident Physician   8/9/2021

## 2021-08-10 NOTE — CARE COORDINATION
Social work / Discharge Planning : SW attempted to meet with patient but patient off floor for EGD. Patient admitted from [address] [place] [address] with GI Bleed. Nursing reports patient independent from HOME with spouse. NO hx of HHC/SNF. Patient PCP is DR Hidalgo . SW to follow.  Electronically signed by MARY Eddy on 8/10/21 at 9:17 AM EDT

## 2021-08-10 NOTE — PROGRESS NOTES
[address]  Progress Note      Chief Complaint: Rectal Bleeding, SOB    Subjective:    Bailee Javed was seen this morning in room laying comfortably in bed. She reports no acute problems overnight. Patient states he has not had a bowel movement since admission. She also stated that she was going to be taken for scopes today. Otherwise patient does admit to shortness of breath whenever she gets up to move. Denies any chest pain, lightheadedness, syncope, fever, or chills. Review of Systems   Constitutional: Negative for chills, fatigue and fever. Respiratory: Positive for shortness of breath (on movement). Negative for cough. Cardiovascular: Negative for chest pain and leg swelling. Gastrointestinal: Positive for anal bleeding. Negative for abdominal pain and nausea. Neurological: Negative for syncope and light-headedness. Objective:    BP (!) 148/68   Pulse 53   Temp 97.8 °F (36.6 °C) (Oral)   Resp 16   Ht 5' 6\" (1.676 m)   Wt 128 lb 8 oz (58.3 kg)   SpO2 98%   BMI 20.74 kg/m²       Physical Exam  Cardiovascular:      Rate and Rhythm: Regular rhythm. Bradycardia present. Heart sounds: Normal heart sounds. No murmur heard. No gallop. Pulmonary:      Effort: Pulmonary effort is normal.      Breath sounds: Normal breath sounds. No wheezing or rhonchi. Abdominal:      General: Bowel sounds are normal. There is no distension. Palpations: Abdomen is soft. Tenderness: There is no abdominal tenderness. Genitourinary:     Rectum: Guaiac result positive (in ED). Musculoskeletal:      Cervical back: No tenderness. Right lower leg: No edema. Left lower leg: No edema. Lymphadenopathy:      Cervical: No cervical adenopathy. Neurological:      Mental Status: She is alert.            CBC:   Lab Results   Component Value Date    WBC 7.1 08/10/2021    RBC 2.46 08/10/2021    HGB 7.8 08/10/2021    HCT 25.2 08/10/2021 to hold Lopressor.      Hx of CVA  -HOLD anticoagulation d/t bleeding risk     Hx of Afib  -Bradycardia (HR 45)  -Lopressor 25 mg BID  -Amiodarone 200 mg daily  -HOLD anticoagulation  -Monitor vitals     HLD  -Atorvastatin 40 mg daily     Hx of Pulmonary Emphysema  -PRN Albuterol, not currently on controller      Code Status: Full  Diet: NPO for EGD  DVT Ppx: PCDs (no anticoagulation due to bleeding risk)  Gi Ppx: Protonix 40 mg BID      Disposition:  Home pending clinical course    García Camara DO  Family Medicine Resident PGY-1

## 2021-08-10 NOTE — ANESTHESIA PRE PROCEDURE
2222    sodium chloride flush 0.9 % injection 5-40 mL  5-40 mL Intravenous PRN Mak Seaman MD        0.9 % sodium chloride infusion  25 mL Intravenous PRN Mak Seaman MD        ondansetron (ZOFRAN-ODT) disintegrating tablet 4 mg  4 mg Oral Q8H PRN Mak Seaman MD        Or    [facility]etron [facility]) injection 4 mg  4 mg Intravenous Q6H PRN Mak Seaman MD        acetaminophen (TYLENOL) tablet 650 mg  650 mg Oral Q6H PRN Mak Seaman MD        Or    acetaminophen (TYLENOL) suppository 650 mg  650 mg Rectal Q6H PRN Mak Seaman MD        0.9 % sodium chloride infusion   Intravenous Continuous Mak Seaman MD 75 mL/hr at 08/09/21 2221 Restarted at 08/10/21 0726    pantoprazole (PROTONIX) injection 40 mg  40 mg Intravenous BID Mak Seaman MD   40 mg at 08/09/21 2224    amiodarone (CORDARONE) tablet 200 mg  200 mg Oral Daily Mak Seaman MD           Allergies:     Allergies   Allergen Reactions    Kenalog [Triamcinolone Acetonide] Other (See Comments)     Flushing of face after Rt knee injection 5/4/2020       Problem List:    Patient Active Problem List   Diagnosis Code    Varicose veins of both lower extremities I83.93    Spider veins I78.1    PVD (peripheral vascular disease) with claudication (HCC) I73.9    Atrial fibrillation (HCC) I48.91    Cardiomyopathy ([facility] [address] I42.9    Hyperlipidemia E78.5    Hypertension I10    Primary osteoarthritis of right knee M17.11    Primary osteoarthritis of right hip M16.11    Cancer of trachea, bronchus, and lung (HCC) C33, C34.80    Pulmonary emphysema (HCC) J43.9    GI bleed K92.2    Acute kidney injury superimposed on CKD (HCC) N17.9, N18.9    Bradycardia R00.1       Past Medical History:        Diagnosis Date    Atrial fibrillation (HCC)     Dr. Patino Bolds [facility]) 2020    lung    Cardiomyopathy [facility])     Dr. Emily Garcia, [facility]    CVA (cerebral vascular accident) [facility])     2014    Hyperlipidemia     Hypertension     PVD (peripheral vascular disease) with claudication ([facility] [address] 2016    Spider veins 2016    Varicose veins of both lower extremities 2016       Past Surgical History:        Procedure Laterality Date    BRONCHOSCOPY N/A 2020    BRONCHOSCOPY EBUS performed by Antonio Patton MD at [address] [facility] 2020    BRONCHOSCOPY NAVIGATIONAL performed by Antonio Patton MD at [address] [facility]  2020    BRONCHOSCOPY ALVEOLAR LAVAGE performed by Antonio Patton MD at [id number] [place] [address]  2020    BRONCHOSCOPY/TRANSBRONCHIAL NEEDLE BIOPSY performed by Antonio Patton MD at [id number] [place] [address]  2020    BRONCHOSCOPY/TRANSBRONCHIAL NEEDLE BIOPSY ADDL LOBE [facility]    COLONOSCOPY      [place]     TUBAL LIGATION         Social History:    Social History     Tobacco Use    Smoking status: Former Smoker     Packs/day: 0.50     Years: 40.00     Pack years: 20.00     Types: Cigarettes     Quit date: 2011     Years since quittin.9    Smokeless tobacco: Former User     Quit date: 2011   Substance Use Topics    Alcohol use: No                                Counseling given: Not Answered      Vital Signs (Current): There were no vitals filed for this visit.                                            BP Readings from Last 3 Encounters:   08/10/21 (!) 148/68   21 100/62   20 98/70       NPO Status:                                                                                 BMI:   Wt Readings from Last 3 Encounters:   08/10/21 128 lb 8 oz (58.3 kg)   21 130 lb (59 kg)   21 131 lb (59.4 kg)     There is no height or weight on file to calculate BMI.    CBC:   Lab Results   Component Value Date    WBC 7.1 08/10/2021    RBC 2.46 08/10/2021    HGB 7.8 08/10/2021    HCT 25.2 08/10/2021    .4 08/10/2021    RDW 17.0 08/10/2021     08/10/2021       CMP:   Lab Results   Component Value Date     08/10/2021    K 4.1 08/10/2021     08/10/2021    CO2 25 08/10/2021    BUN 30 08/10/2021    CREATININE 1.8 08/10/2021    GFRAA 33 08/10/2021    LABGLOM 28 08/10/2021    GLUCOSE 86 08/10/2021    GLUCOSE 89 02/15/2011    PROT 7.4 08/09/2021    CALCIUM 8.7 08/10/2021    BILITOT 1.2 08/09/2021    ALKPHOS 53 08/09/2021    AST 24 08/09/2021    ALT 15 08/09/2021       POC Tests: No results for input(s): POCGLU, POCNA, POCK, POCCL, POCBUN, POCHEMO, POCHCT in the last 72 hours. Coags:   Lab Results   Component Value Date    PROTIME 14.3 08/10/2021    PROTIME 17.2 02/15/2011    INR 1.3 08/10/2021    APTT 41.2 02/15/2011       HCG (If Applicable): No results found for: PREGTESTUR, PREGSERUM, HCG, HCGQUANT     ABGs: No results found for: PHART, PO2ART, BBK1LCU, XDN6YSW, BEART, A1JGJLVB     Type & Screen (If Applicable):  No results found for: LABABO, [address]    Anesthesia Evaluation  Patient summary reviewed and Nursing notes reviewed no history of anesthetic complications:   Airway: Mallampati: II  TM distance: >3 FB   Neck ROM: full  Mouth opening: < 3 FB Dental: normal exam         Pulmonary: breath sounds clear to auscultation  (+) COPD: moderate and severe,  shortness of breath: chronic,      (-) no decreased breath sounds                          ROS comment: Abnormal CT   Cardiovascular:  Exercise tolerance: good (>4 METS),   (+) hypertension:, dysrhythmias (S/P ablation): atrial fibrillation, CHF: diastolic and no interval change, WALKER:, hyperlipidemia      ECG reviewed  Rhythm: regular  Rate: normal  Echocardiogram reviewed                  Neuro/Psych:   (+) CVA (states memory loss is only symptom): no interval change,             GI/Hepatic/Renal:        (-) liver disease and no renal disease      ROS comment: GI bleed.    Endo/Other:    (+) blood dyscrasia: anemia and anticoagulation therapy, arthritis:., malignancy/cancer (Lung). Abdominal:         (-) obese       Vascular:   + PVD, aortic or cerebral, . Other Findings:               Anesthesia Plan      MAC     ASA 4       Induction: intravenous. MIPS: Prophylactic antiemetics administered. Anesthetic plan and risks discussed with patient. Use of blood products discussed with patient whom consented to blood products. Plan discussed with CRNA.                   Jennifer Foley MD   8/10/2021

## 2021-08-10 NOTE — PATIENT CARE CONFERENCE
P Quality Flow/Interdisciplinary Rounds Progress Note        Quality Flow Rounds held on August 10, 2021    Disciplines Attending:  Bedside Nurse, ,  and Nursing Unit Leadership    Imleda Oakley was admitted on 8/9/2021  9:31 PM    Anticipated Discharge Date:  Expected Discharge Date: 08/16/21    Disposition:    Abhinav Score:  Abhinav Scale Score: 22    Readmission Risk              Risk of Unplanned Readmission:  15           Discussed patient goal for the day, patient clinical progression, and barriers to discharge.   The following Goal(s) of the Day/Commitment(s) have been identified:  c scope/ 8/11monitor kidney funcition monitor HR      Marv Ruiz RN  August 10, 2021

## 2021-08-10 NOTE — PROGRESS NOTES
[address]  Progress Note      Chief Complaint: Rectal Bleeding, SOB    Subjective:    Maikel Javed was seen this morning in room laying comfortably in bed. She reports no acute problems overnight. She denies any blood in her stool while she was taking the bowel prep overnight. She did admit to one episode of emesis due to bowel prep. Patient is concerned over not taking Eliquis x4 (d/t GI bleed) days and did call her cardiologist at [facility] to inform him. Denies any chest pain, lightheadedness, syncope, fever, or chills. Review of Systems   Constitutional: Negative for chills, fatigue and fever. Respiratory: Positive for shortness of breath (on movement). Negative for cough. Cardiovascular: Negative for chest pain and leg swelling. Gastrointestinal: Positive for anal bleeding. Negative for abdominal pain and nausea. Neurological: Negative for syncope and light-headedness. Objective:    BP (!) 148/61   Pulse 52   Temp 97.6 °F (36.4 °C) (Oral)   Resp 16   Ht 5' 6\" (1.676 m)   Wt 128 lb 8 oz (58.3 kg)   SpO2 95%   BMI 20.74 kg/m²       Physical Exam  Cardiovascular:      Rate and Rhythm: Regular rhythm. Bradycardia present. Heart sounds: Normal heart sounds. No murmur heard. No gallop. Pulmonary:      Effort: Pulmonary effort is normal.      Breath sounds: Normal breath sounds. No wheezing or rhonchi. Abdominal:      General: Bowel sounds are normal. There is no distension. Palpations: Abdomen is soft. Tenderness: There is no abdominal tenderness. Genitourinary:     Rectum: Guaiac result positive (in ED). Musculoskeletal:      Cervical back: No tenderness. Right lower leg: No edema. Left lower leg: No edema. Lymphadenopathy:      Cervical: No cervical adenopathy. Neurological:      Mental Status: She is alert.            CBC:   Lab Results   Component Value Date    WBC 7.9 08/11/2021    RBC 2.47 08/11/2021    HGB 7.7 08/11/2021    HCT 25.9 08/11/2021    .9 08/11/2021    MCH 31.2 08/11/2021    MCHC 29.7 08/11/2021    RDW 17.0 08/11/2021     08/11/2021    MPV 9.5 08/11/2021     CMP:    Lab Results   Component Value Date     08/11/2021    K 3.9 08/11/2021     08/11/2021    CO2 20 08/11/2021    BUN 20 08/11/2021    CREATININE 1.5 08/11/2021    GFRAA 41 08/11/2021    LABGLOM 34 08/11/2021    GLUCOSE 85 08/11/2021    GLUCOSE 89 02/15/2011    PROT 7.4 08/09/2021    LABALBU 4.1 08/09/2021    LABALBU 3.7 02/15/2011    CALCIUM 8.4 08/11/2021    BILITOT 1.2 08/09/2021    ALKPHOS 53 08/09/2021    AST 24 08/09/2021    ALT 15 08/09/2021        Assessment:    Active Hospital Problems    Diagnosis Date Noted    Anemia due to blood loss, acute [D62]     GI bleed [K92.2] 08/09/2021    Acute kidney injury superimposed on CKD ([facility] [N17.9, N18.9] 08/09/2021    Bradycardia [R00.1] 08/09/2021    Hyperlipidemia [E78.5]     Atrial fibrillation (HCC) [I48.91]        Plan:     GI Bleed  Given hemmocult positive stool and ongoing rectal bleeding presume lower GI source but may be mixed component given LAISHA  -Iron studies WNL  -Trend H/h q6h, transfuse below 7  -Gen Surgery following - Dr. Polina German per PCP request, appreciate input  -Last Cscope multiple years ago only remarkable for hemorrhoid  -EGD 8/10 --> no evidence Upper GI Bleed  -C-scope 8/11     LAISHA on CKD, improving  Cr elevated to 1.5 from baseline of around 1.2  -Presume decreased effective renal perfusion source given ongoing GI losses  -FEUrea 33.3% --> suggest pre-renal disease  -IVF 75 mL/hour  -Caution with excess fluids given history of diastolic HF  -Hold Home Lasix and Spironolactone     Bradycardia  Appears to be near baseline with HR in the 50s in ER although initial vitals inpatient documented HR of 45. Documented history of Afib.   Did take a dose of Lopressor on route to [facility] from [address]  -Telemetry monitoring  -Consider further workup if further decrease or change in symptoms, may need to hold Lopressor.      Hx of CVA  -HOLD anticoagulation d/t bleeding risk     Hx of Afib  -Bradycardia (HR 52)  -Lopressor 12.5 mg BID  -Amiodarone 200 mg daily  -HOLD anticoagulation  -Monitor vitals     HLD  -Atorvastatin 40 mg daily     Hx of Pulmonary Emphysema  -PRN Albuterol, not currently on controller      Code Status: Full  Diet: NPO for Colonoscopy  DVT Ppx: PCDs (no anticoagulation due to bleeding risk)  Gi Ppx: None      Disposition:  Home pending clinical course    Med Berry DO  Family Medicine Resident PGY-1

## 2021-08-10 NOTE — CONSULTS
GENERAL SURGERY  CONSULT NOTE  8/10/2021    Physician Consulted: Dr. Maribel Tirado  Reason for Consult: GI bleed  Referring Physician: Dr. [facility]  Shelley Liang is a 66 y.o. female who presents for evaluation of GI bleeding. She reports 1 week of melena and hematochezia. Most recent Hgb 7.8 which is down from her baseline of 13. No nausea or vomiting. Last BM was yesterday. She takes Xarelto for A Fib and cardiomyopathy, last dose was 8/7. She is a former smoker. No regular NSAID or steroid use. No history of PUD. She has never had an EGD. Last colonoscopy was in 2015 with  in [place] and was normal.      Past Medical History:   Diagnosis Date    Atrial fibrillation ([facility] [address]     Dr. Leger Collar [facility]) 2020    lung    Cardiomyopathy [facility])     Dr. Katelin Mendez, [address] CVA (cerebral vascular accident) [facility])     2014    Hyperlipidemia     Hypertension     PVD (peripheral vascular disease) with claudication ([facility] [address] 9/7/2016    Spider veins 9/7/2016    Varicose veins of both lower extremities 9/7/2016       Past Surgical History:   Procedure Laterality Date    BRONCHOSCOPY N/A 1/31/2020    BRONCHOSCOPY EBUS performed by Yulisa Dominguez MD at [facility] 1/31/2020    BRONCHOSCOPY NAVIGATIONAL performed by Yulisa Dominguez MD at [address]  1/31/2020    BRONCHOSCOPY ALVEOLAR LAVAGE performed by Yulisa Dominguez MD at [address]  1/31/2020    BRONCHOSCOPY/TRANSBRONCHIAL NEEDLE BIOPSY performed by Yulisa Dominguez MD at [address]  1/31/2020    BRONCHOSCOPY/TRANSBRONCHIAL NEEDLE BIOPSY ADDL LOBE [facility]  2017    ablation [address]         Medications Prior to Admission:    Prior to Admission medications    Medication Sig Start Date End Date Taking?  Authorizing Provider   rivaroxaban (XARELTO) 20 MG TABS tablet Take 20 mg by mouth   Yes Historical Provider, MD   amiodarone (CORDARONE) 200 MG tablet Take 200 mg by mouth daily   Yes Historical Provider, MD   albuterol sulfate HFA (VENTOLIN HFA) 108 (90 Base) MCG/ACT inhaler Inhale 2 puffs into the lungs 4 times daily as needed for Wheezing 21  Yes Nu Hidalgo DO   furosemide (LASIX) 40 MG tablet TAKE ONE TABLET BY MOUTH EVERY DAY 19  Yes Historical Provider, MD   atorvastatin (LIPITOR) 40 MG tablet Take 40 mg by mouth daily  19  Yes Historical Provider, MD   metoprolol tartrate (LOPRESSOR) 25 MG tablet Take 25 mg by mouth 2 times daily  17  Yes Historical Provider, MD   spironolactone (ALDACTONE) 25 MG tablet Take 25 mg by mouth daily  18  Yes Historical Provider, MD   Handicap Placard MISC by Does not apply route Duration: Lifetime 18   Gabbi Hidalgo DO       Allergies   Allergen Reactions    Kenalog [Triamcinolone Acetonide] Other (See Comments)     Flushing of face after Rt knee injection 2020       Family History   Problem Relation Age of Onset    Breast Cancer Mother     Diabetes Father        Social History     Tobacco Use    Smoking status: Former Smoker     Packs/day: 0.50     Years: 40.00     Pack years: 20.00     Types: Cigarettes     Quit date: 2011     Years since quittin.9    Smokeless tobacco: Former User     Quit date: 2011   Substance Use Topics    Alcohol use: No    Drug use: No         Review of Systems   General ROS: negative for - chills, fatigue, fever, night sweats  Hematological and Lymphatic ROS: negative for - lymphadenopathy or jaundice  Respiratory ROS: no cough, shortness of breath, or wheezing  Cardiovascular ROS: no chest pain or dyspnea on exertion  Gastrointestinal ROS: positive for melena, hematochezia. negative for nausea, vomiting  Genito-Urinary ROS: no dysuria, trouble voiding, or hematuria  Musculoskeletal ROS: negative      PHYSICAL EXAM:    Vitals:    21 2133   BP: (!) 156/70   Pulse: (!) 45   Resp: 16   Temp: 97.8 °F (36.6 °C)   SpO2: 92%       General Appearance:  awake, alert, oriented, in no acute distress  Skin:  Skin color, texture, turgor normal. No rashes or lesions. Head/face:  NCAT  Eyes:  No gross abnormalities. , PERRL, EOMI and Sclera nonicteric  Lungs/Chest:  Normal expansion. No chest wall tenderness  Heart:  LifeVest present. No current chest pain  Abdomen:  Soft, non-tender, non-distended. No palpable organomegaly or masses. Extremities: Extremities warm to touch, pink, with no edema. LABS:    CBC  Recent Labs     08/10/21  0347   WBC 7.1   HGB 7.8*   HCT 25.2*        BMP  Recent Labs     08/10/21  0347      K 4.1   *   CO2 25   BUN 30*   CREATININE 1.8*   CALCIUM 8.7     Liver Function  Recent Labs     08/09/21  1459   BILITOT 1.2   AST 24   ALT 15   ALKPHOS 53   PROT 7.4   LABALBU 4.1     No results for input(s): LACTATE in the last 72 hours. Recent Labs     08/10/21  0347   INR 1.3       RADIOLOGY    No results found.       ASSESSMENT:  66 y.o. female with anemia, likely secondary to GI bleeding    PLAN:  - NPO  - hold anticoagulation  - Protonix 40mg BID  - EGD 8/10  - possible colonoscopy pending EGD findings    Electronically signed by Pernell Ortiz DO on 8/10/21 at 6:39 AM EDT

## 2021-08-10 NOTE — ANESTHESIA POSTPROCEDURE EVALUATION
Department of Anesthesiology  Postprocedure Note    Patient: Amrita Calhoun  MRN: [id number]  YOB: 1950  Date of evaluation: 8/10/2021  Time:  8:27 AM     Procedure Summary     Date: 08/10/21 Room / Location: [facility] [address] / [address] [place] [address]    Anesthesia Start: 3635 Anesthesia Stop: 3676    Procedure: EGD ESOPHAGOGASTRODUODENOSCOPY (N/A ) Diagnosis: (Melena)    Surgeons: Capo Baker MD Responsible Provider: Uriel Hernandez MD    Anesthesia Type: MAC ASA Status: 4          Anesthesia Type: MAC    Lizett Phase I:      Lizett Phase II: Lizett Score: 10    Last vitals: Reviewed and per EMR flowsheets.        Anesthesia Post Evaluation    Patient location during evaluation: PACU  Patient participation: complete - patient participated  Level of consciousness: awake and alert  Airway patency: patent  Nausea & Vomiting: no vomiting and no nausea  Complications: no  Cardiovascular status: blood pressure returned to baseline  Respiratory status: acceptable  Hydration status: stable

## 2021-08-10 NOTE — ANESTHESIA PRE PROCEDURE
Department of Anesthesiology  Preprocedure Note       Name:  Gene Zelaya   Age:  66 y.o.  :  1950                                          MRN:  [id number]         Date:  8/10/2021      Surgeon: Kana Colon):  Camilo Ramirez MD    Procedure: COLONOSCOPY DIAGNOSTIC (N/A )    Medications prior to admission:   Prior to Admission medications    Medication Sig Start Date End Date Taking? Authorizing Provider   rivaroxaban (XARELTO) 20 MG TABS tablet Take 20 mg by mouth    Historical Provider, MD   amiodarone (CORDARONE) 200 MG tablet Take 200 mg by mouth daily    Historical Provider, MD   albuterol sulfate HFA (VENTOLIN HFA) 108 (90 Base) MCG/ACT inhaler Inhale 2 puffs into the lungs 4 times daily as needed for Wheezing 21   Nu Hidalgo DO   furosemide (LASIX) 40 MG tablet TAKE ONE TABLET BY MOUTH EVERY DAY 19   Historical Provider, MD   atorvastatin (LIPITOR) 40 MG tablet Take 40 mg by mouth daily  19   Historical Provider, MD   Handicap Placard MISC by Does not apply route Duration: Lifetime 18   Zina Hidalgo,    metoprolol tartrate (LOPRESSOR) 25 MG tablet Take 25 mg by mouth 2 times daily  17   Historical Provider, MD   spironolactone (ALDACTONE) 25 MG tablet Take 25 mg by mouth daily  18   Historical Provider, MD       Current medications:    No current facility-administered medications for this visit. No current outpatient medications on file.      Facility-Administered Medications Ordered in Other Visits   Medication Dose Route Frequency Provider Last Rate Last Admin    albuterol (PROVENTIL) nebulizer solution 2.5 mg  2.5 mg Nebulization Q6H PRN Camilo Ramirez MD        atorvastatin (LIPITOR) tablet 40 mg  40 mg Oral Daily Camilo Ramirez MD   40 mg at 08/10/21 1019    metoprolol tartrate (LOPRESSOR) tablet 25 mg  25 mg Oral BID Camilo Ramirez MD        sodium chloride flush 0.9 % injection 5-40 mL  5-40 mL Intravenous 2 times per day Camilo Ramirez MD   10 mL at 08/09/21 2222    sodium chloride flush 0.9 % injection 5-40 mL  5-40 mL Intravenous PRN Gutierrez Baez MD        0.9 % sodium chloride infusion  25 mL Intravenous PRN Gutierrez Baez MD        ondansetron (ZOFRAN-ODT) disintegrating tablet 4 mg  4 mg Oral Q8H PRN Gutierrez Baez MD        Or    ondansetron [facility]) injection 4 mg  4 mg Intravenous Q6H PRN Gutierrez Baez MD        acetaminophen (TYLENOL) tablet 650 mg  650 mg Oral Q6H PRN Gutierrez Baez MD        Or    acetaminophen (TYLENOL) suppository 650 mg  650 mg Rectal Q6H PRN Gutierrez Baez MD        0.9 % sodium chloride infusion   Intravenous Continuous Gutierrez Baez MD 75 mL/hr at 08/09/21 2221 Restarted at 08/10/21 0726    amiodarone (CORDARONE) tablet 200 mg  200 mg Oral Daily Gutierrez Baez MD   200 mg at 08/10/21 1018       Allergies:     Allergies   Allergen Reactions    Kenalog [Triamcinolone Acetonide] Other (See Comments)     Flushing of face after Rt knee injection 5/4/2020       Problem List:    Patient Active Problem List   Diagnosis Code    Varicose veins of both lower extremities I83.93    Spider veins I78.1    PVD (peripheral vascular disease) with claudication ([facility]) I73.9    Atrial fibrillation (HCC) I48.91    Cardiomyopathy ([facility] I42.9    Hyperlipidemia E78.5    Hypertension I10    Primary osteoarthritis of right knee M17.11    Primary osteoarthritis of right hip M16.11    Cancer of trachea, bronchus, and lung (HCC) C33, C34.80    Pulmonary emphysema (HCC) J43.9    GI bleed K92.2    Acute kidney injury superimposed on CKD (HCC) N17.9, N18.9    Bradycardia R00.1    Anemia due to blood loss, acute D62       Past Medical History:        Diagnosis Date    Atrial fibrillation (HCC)     Dr. Leydi Georges [facility]) 2020    lung    Cardiomyopathy [facility])     Dr. David Brower, [facility]    CVA (cerebral vascular accident) [facility])     2014    Hyperlipidemia     Hypertension     PVD (peripheral vascular disease) with claudication ([facility] [address] 9/7/2016    Spider Value Date     08/10/2021    K 4.1 08/10/2021     08/10/2021    CO2 25 08/10/2021    BUN 30 08/10/2021    CREATININE 1.8 08/10/2021    GFRAA 33 08/10/2021    LABGLOM 28 08/10/2021    GLUCOSE 86 08/10/2021    GLUCOSE 89 02/15/2011    PROT 7.4 08/09/2021    CALCIUM 8.7 08/10/2021    BILITOT 1.2 08/09/2021    ALKPHOS 53 08/09/2021    AST 24 08/09/2021    ALT 15 08/09/2021       POC Tests: No results for input(s): POCGLU, POCNA, POCK, POCCL, POCBUN, POCHEMO, POCHCT in the last 72 hours. Coags:   Lab Results   Component Value Date    PROTIME 14.3 08/10/2021    PROTIME 17.2 02/15/2011    INR 1.3 08/10/2021    APTT 41.2 02/15/2011       HCG (If Applicable): No results found for: PREGTESTUR, PREGSERUM, HCG, HCGQUANT     ABGs: No results found for: PHART, PO2ART, TWP6GSL, MBC0MJO, BEART, J0WMZGVQ     Type & Screen (If Applicable):  No results found for: LABABO, [address]    Anesthesia Evaluation  Patient summary reviewed and Nursing notes reviewed no history of anesthetic complications:   Airway: Mallampati: II  TM distance: >3 FB   Neck ROM: full  Mouth opening: < 3 FB Dental: normal exam         Pulmonary: breath sounds clear to auscultation  (+) COPD: moderate and severe,  shortness of breath: chronic,      (-) no decreased breath sounds                          ROS comment: Abnormal CT   Cardiovascular:  Exercise tolerance: good (>4 METS),   (+) hypertension:, dysrhythmias (S/P ablation): atrial fibrillation, CHF: diastolic and no interval change, WALKER:, hyperlipidemia      ECG reviewed  Rhythm: regular  Rate: normal  Echocardiogram reviewed         Beta Blocker:  Dose within 24 Hrs         Neuro/Psych:   (+) CVA (states memory loss is only symptom): no interval change,             GI/Hepatic/Renal:   (+) bowel prep,      (-) liver disease and no renal disease      ROS comment: GI bleed.    Endo/Other:    (+) blood dyscrasia: anemia and anticoagulation therapy, arthritis:., malignancy/cancer (Lung). Abdominal:         (-) obese       Vascular:   + PVD, aortic or cerebral, . Other Findings:             Anesthesia Plan      MAC     ASA 4       Induction: intravenous. Anesthetic plan and risks discussed with patient and spouse. Use of blood products discussed with patient whom consented to blood products.            Patient to be re-evaluated by [facility] Anesthesiologist          Leslie Kumar MD   8/10/2021

## 2021-08-10 NOTE — OP NOTE
Sailaja Javed  YOB: 1950  [id number]    Pre-operative Diagnosis:  GI bleed    Post-operative Diagnosis: No evidence of upper GI bleed    Procedure: EGD     Anesthesia: [facility]    Surgeon: Aida Seo MD    Assistant: None    Estimated Blood Loss: none    Complications: none    Specimens: None    Procedure:  Pt was taken to the endoscopy suite and placed on the endoscopy table in a left lateral decubitus position. LMAC anesthesia was administered and a bite block was inserted. A lubricated gastroscope was inserted into the oropharynx and advanced into the esophagus. The esophagus was inspected throughout its length. There were no varices. No evidence of esophagitis. The GE junction was sharp and located at 42 cm. The stomach was entered and insufflated. No blood was noted. The antrum was mildly inflamed. The pylorus was intubated. The first and second portions of the duodenum were normal.  The scope was pulled back into the antrum and retroflexed. The angle of the stomach was normal.  The proximal greater and lesser curves were normal.  The fundus was normal.  At the GE junction, there was no evidence of hiatal hernia. The stomach was deflated and the scope was withdrawn and removed. The patient tolerated the procedure well.     Impression: No evidence of upper GI bleed    Plan: Prep for colonoscopy      Aida Seo MD

## 2021-08-11 ENCOUNTER — APPOINTMENT (OUTPATIENT)
Dept: GENERAL RADIOLOGY | Age: 71
DRG: 378 | End: 2021-08-11
Attending: FAMILY MEDICINE
Payer: MEDICARE

## 2021-08-11 ENCOUNTER — ANESTHESIA (OUTPATIENT)
Dept: ENDOSCOPY | Age: 71
DRG: 378 | End: 2021-08-11
Payer: MEDICARE

## 2021-08-11 VITALS
SYSTOLIC BLOOD PRESSURE: 122 MMHG | DIASTOLIC BLOOD PRESSURE: 57 MMHG | OXYGEN SATURATION: 92 % | RESPIRATION RATE: 14 BRPM

## 2021-08-11 LAB
ANION GAP SERPL CALCULATED.3IONS-SCNC: 9 MMOL/L (ref 7–16)
BASOPHILS ABSOLUTE: 0.05 E9/L (ref 0–0.2)
BASOPHILS RELATIVE PERCENT: 0.6 % (ref 0–2)
BUN BLDV-MCNC: 20 MG/DL (ref 6–23)
CALCIUM SERPL-MCNC: 8.4 MG/DL (ref 8.6–10.2)
CHLORIDE BLD-SCNC: 111 MMOL/L (ref 98–107)
CO2: 20 MMOL/L (ref 22–29)
CREAT SERPL-MCNC: 1.5 MG/DL (ref 0.5–1)
EOSINOPHILS ABSOLUTE: 0.2 E9/L (ref 0.05–0.5)
EOSINOPHILS RELATIVE PERCENT: 2.5 % (ref 0–6)
GFR AFRICAN AMERICAN: 41
GFR NON-AFRICAN AMERICAN: 34 ML/MIN/1.73
GLUCOSE BLD-MCNC: 85 MG/DL (ref 74–99)
HCT VFR BLD CALC: 25.9 % (ref 34–48)
HCT VFR BLD CALC: 26.1 % (ref 34–48)
HCT VFR BLD CALC: 27.3 % (ref 34–48)
HCT VFR BLD CALC: 30.3 % (ref 34–48)
HEMOGLOBIN: 7.7 G/DL (ref 11.5–15.5)
HEMOGLOBIN: 8.1 G/DL (ref 11.5–15.5)
HEMOGLOBIN: 8.1 G/DL (ref 11.5–15.5)
HEMOGLOBIN: 9.1 G/DL (ref 11.5–15.5)
IMMATURE GRANULOCYTES #: 0.03 E9/L
IMMATURE GRANULOCYTES %: 0.4 % (ref 0–5)
LYMPHOCYTES ABSOLUTE: 1.21 E9/L (ref 1.5–4)
LYMPHOCYTES RELATIVE PERCENT: 15.2 % (ref 20–42)
MCH RBC QN AUTO: 31.2 PG (ref 26–35)
MCHC RBC AUTO-ENTMCNC: 29.7 % (ref 32–34.5)
MCV RBC AUTO: 104.9 FL (ref 80–99.9)
MONOCYTES ABSOLUTE: 0.68 E9/L (ref 0.1–0.95)
MONOCYTES RELATIVE PERCENT: 8.6 % (ref 2–12)
NEUTROPHILS ABSOLUTE: 5.77 E9/L (ref 1.8–7.3)
NEUTROPHILS RELATIVE PERCENT: 72.7 % (ref 43–80)
PDW BLD-RTO: 17 FL (ref 11.5–15)
PLATELET # BLD: 217 E9/L (ref 130–450)
PMV BLD AUTO: 9.5 FL (ref 7–12)
POTASSIUM REFLEX MAGNESIUM: 3.9 MMOL/L (ref 3.5–5)
RBC # BLD: 2.47 E12/L (ref 3.5–5.5)
SODIUM BLD-SCNC: 140 MMOL/L (ref 132–146)
WBC # BLD: 7.9 E9/L (ref 4.5–11.5)

## 2021-08-11 PROCEDURE — 71046 X-RAY EXAM CHEST 2 VIEWS: CPT

## 2021-08-11 PROCEDURE — 7100000010 HC PHASE II RECOVERY - FIRST 15 MIN: Performed by: SURGERY

## 2021-08-11 PROCEDURE — 1200000000 HC SEMI PRIVATE

## 2021-08-11 PROCEDURE — 7100000011 HC PHASE II RECOVERY - ADDTL 15 MIN: Performed by: SURGERY

## 2021-08-11 PROCEDURE — 80048 BASIC METABOLIC PNL TOTAL CA: CPT

## 2021-08-11 PROCEDURE — 36415 COLL VENOUS BLD VENIPUNCTURE: CPT

## 2021-08-11 PROCEDURE — 6370000000 HC RX 637 (ALT 250 FOR IP): Performed by: SURGERY

## 2021-08-11 PROCEDURE — 85014 HEMATOCRIT: CPT

## 2021-08-11 PROCEDURE — 85025 COMPLETE CBC W/AUTO DIFF WBC: CPT

## 2021-08-11 PROCEDURE — 2500000003 HC RX 250 WO HCPCS: Performed by: NURSE ANESTHETIST, CERTIFIED REGISTERED

## 2021-08-11 PROCEDURE — 6370000000 HC RX 637 (ALT 250 FOR IP): Performed by: FAMILY MEDICINE

## 2021-08-11 PROCEDURE — 3700000001 HC ADD 15 MINUTES (ANESTHESIA): Performed by: SURGERY

## 2021-08-11 PROCEDURE — 2580000003 HC RX 258: Performed by: SURGERY

## 2021-08-11 PROCEDURE — 3609027000 HC COLONOSCOPY: Performed by: SURGERY

## 2021-08-11 PROCEDURE — 2580000003 HC RX 258: Performed by: NURSE ANESTHETIST, CERTIFIED REGISTERED

## 2021-08-11 PROCEDURE — 6370000000 HC RX 637 (ALT 250 FOR IP): Performed by: STUDENT IN AN ORGANIZED HEALTH CARE EDUCATION/TRAINING PROGRAM

## 2021-08-11 PROCEDURE — 3700000000 HC ANESTHESIA ATTENDED CARE: Performed by: SURGERY

## 2021-08-11 PROCEDURE — 2709999900 HC NON-CHARGEABLE SUPPLY: Performed by: SURGERY

## 2021-08-11 PROCEDURE — 6360000002 HC RX W HCPCS: Performed by: NURSE ANESTHETIST, CERTIFIED REGISTERED

## 2021-08-11 PROCEDURE — 85018 HEMOGLOBIN: CPT

## 2021-08-11 PROCEDURE — 0DJD8ZZ INSPECTION OF LOWER INTESTINAL TRACT, VIA NATURAL OR ARTIFICIAL OPENING ENDOSCOPIC: ICD-10-PCS | Performed by: SURGERY

## 2021-08-11 PROCEDURE — 94640 AIRWAY INHALATION TREATMENT: CPT

## 2021-08-11 PROCEDURE — 99232 SBSQ HOSP IP/OBS MODERATE 35: CPT | Performed by: FAMILY MEDICINE

## 2021-08-11 RX ORDER — SODIUM CHLORIDE 9 MG/ML
INJECTION, SOLUTION INTRAVENOUS CONTINUOUS PRN
Status: DISCONTINUED | OUTPATIENT
Start: 2021-08-11 | End: 2021-08-11 | Stop reason: SDUPTHER

## 2021-08-11 RX ORDER — IPRATROPIUM BROMIDE AND ALBUTEROL SULFATE 2.5; .5 MG/3ML; MG/3ML
1 SOLUTION RESPIRATORY (INHALATION) 4 TIMES DAILY
Status: DISCONTINUED | OUTPATIENT
Start: 2021-08-11 | End: 2021-08-12 | Stop reason: HOSPADM

## 2021-08-11 RX ORDER — GLYCOPYRROLATE 1 MG/5 ML
SYRINGE (ML) INTRAVENOUS PRN
Status: DISCONTINUED | OUTPATIENT
Start: 2021-08-11 | End: 2021-08-11 | Stop reason: SDUPTHER

## 2021-08-11 RX ORDER — PROPOFOL 10 MG/ML
INJECTION, EMULSION INTRAVENOUS PRN
Status: DISCONTINUED | OUTPATIENT
Start: 2021-08-11 | End: 2021-08-11 | Stop reason: SDUPTHER

## 2021-08-11 RX ADMIN — Medication 0.2 MG: at 11:51

## 2021-08-11 RX ADMIN — METOPROLOL TARTRATE 12.5 MG: 25 TABLET, FILM COATED ORAL at 20:52

## 2021-08-11 RX ADMIN — SODIUM CHLORIDE: 9 INJECTION, SOLUTION INTRAVENOUS at 11:41

## 2021-08-11 RX ADMIN — ATORVASTATIN CALCIUM 40 MG: 40 TABLET, FILM COATED ORAL at 07:55

## 2021-08-11 RX ADMIN — RIVAROXABAN 15 MG: 15 TABLET, FILM COATED ORAL at 17:24

## 2021-08-11 RX ADMIN — PROPOFOL 100 MG: 10 INJECTION, EMULSION INTRAVENOUS at 11:48

## 2021-08-11 RX ADMIN — AMIODARONE HYDROCHLORIDE 200 MG: 200 TABLET ORAL at 07:55

## 2021-08-11 RX ADMIN — IPRATROPIUM BROMIDE AND ALBUTEROL SULFATE 1 AMPULE: .5; 3 SOLUTION RESPIRATORY (INHALATION) at 16:25

## 2021-08-11 RX ADMIN — METOPROLOL TARTRATE 12.5 MG: 25 TABLET, FILM COATED ORAL at 07:55

## 2021-08-11 RX ADMIN — SODIUM CHLORIDE, PRESERVATIVE FREE 10 ML: 5 INJECTION INTRAVENOUS at 20:52

## 2021-08-11 ASSESSMENT — ENCOUNTER SYMPTOMS
ABDOMINAL PAIN: 0
SHORTNESS OF BREATH: 1
NAUSEA: 0
COUGH: 0

## 2021-08-11 ASSESSMENT — PAIN SCALES - GENERAL
PAINLEVEL_OUTOF10: 0

## 2021-08-11 NOTE — OP NOTE
Bailee Javed  YOB: 1950  [id number]    Pre-operative Diagnosis: GI bleeding    Post-operative Diagnosis:    Internal hemorrhoids, otherwise grossly normal    Procedure: Colonoscopy     Anesthesia: [facility]    Surgeon: Ady Diallo MD    Assistant: None    Estimated Blood Loss: none    Complications: none    Specimens: none    Procedure:   Pt was taken to the endoscopy suite and placed on the table in a left lateral decubitus position. LMAC anesthesia was administered. A digital rectal examination revealed no gross blood, normal tone, no mass was palpated. A lubricated colonoscope was inserted and advanced to the cecum without difficulty. The ileocecal valve and appendiceal orifice were identified and photographed. Prep was good. Cecum, ascending colon, hepatic flexure, transverse colon, splenic flexure, descending colon, sigmoid colon were all extensively inspected. There was no pathology noted. The proximal rectum was normal. There were internal hemorrhoids on retroflexion. The colon was deflated. The scope was removed. The patient tolerated the procedure well. Impression: no pathology other than hemorrhoids.   Could have been small bowel bleed      Plan:   Resume diet and xarelto, observe for recurrent bleeding    Madisyn Pinto MD, MD,  8/11/2021, 12:01 PM

## 2021-08-11 NOTE — PROGRESS NOTES
GENERAL SURGERY  DAILY PROGRESS NOTE  8/11/2021    CHIEF COMPLAINT:  No chief complaint on file. SUBJECTIVE:  Patient tolerated 2 bottles of mag citrate overnight before she became nauseous. Only thin yellow stool from below this am. No other complaints at this time   Tolerating diet -- Diet NPO Exceptions are: Sips of Water with Meds    OBJECTIVE:  BP (!) 118/56   Pulse 57   Temp 98.3 °F (36.8 °C) (Oral)   Resp 16   Ht 5' 6\" (1.676 m)   Wt 128 lb 8 oz (58.3 kg)   SpO2 96%   BMI 20.74 kg/m²     GENERAL:  NAD. A&Ox3. LUNGS:  No increased work of breathing.   CARDIOVASCULAR: RR  ABDOMEN:  Soft, non-distended, non-tender    ASSESSMENT/PLAN:  66 y.o. female with GIB     -c-scope 8/11    Grayson Vasquez MD  Surgery Resident PGY-1  8/11/2021  8:25 AM

## 2021-08-11 NOTE — PROGRESS NOTES
2 different orders put in for anticoagulant. Patient takes xarelto at home and will only take this medication.  Doctor notified

## 2021-08-11 NOTE — PROGRESS NOTES
[id number]  Progress Note    Chief complaint :  Rectal Bleeding, SOB    Subjective:    No overnight acute events. Patient describes feeling about the same as before. Patient endorses feeling slightly short of breath on activity but not at rest - this is her baseline. Patient was seen this morning in room laying comfortably in bed. Patient denies bloody stools, melena with bowel movements overnight. Patient is tolerating diet. She has received one breathing treatment and will receive another later this morning. Past medical, surgical, family and social history were reviewed, non-contributory, and unchanged unless otherwise stated. Review of Systems  Constitutional: Negative for chills, nausea, and fever. HEENT: Negative for sore throat and visual changes  Respiratory: Positive for shortness of breath. Negative for wheezing, cough. Cardiovascular: Negative for chest pain, palpitations and leg swelling. GI:Negative for abdominal pain, diarrhea, vomiting, constipation, melena. Genitourinary: Negative for dysuria, frequency, hematuria  MSK: Negative for back pain, extremity pain, and joint swelling. Skin: Negative rash  Neurological: Negative for light-headedness, dizziness, numbness. Psychiatric: Negative for sleep disturbance, mood disturbance. Objective:  BP (!) 130/59   Pulse (!) 45   Temp 97.9 °F (36.6 °C) (Oral)   Resp 18   Ht 5' 6\" (1.676 m)   Wt 128 lb 8 oz (58.3 kg)   SpO2 97%   BMI 20.74 kg/m²     Physical Exam  Vitals reviewed and stable  Constitutional: She is well-appearing in no acute distress. Appears stated age. HEENT: Normocephalic, atraumatic, conjunctivae normal, PERRLA  Cardiovascular: Regular rhythm. Bradycardia present. No murmur, rubs, or gallops. Pulmonary: Lungs clear to auscultation. Breath sounds are normal. Effort is normal. No wheezes, rhonchi, or rales appreciated.   Abdominal: Bowel normoactive sounds, nondistended, soft and nontender to palpation. MSK: No back tenderness, No UE or LE edema. Neurological: A&O x3. Appropriate mood and affect.        Labs:  Recent Results (from the past 24 hour(s))   Hemoglobin and hematocrit, blood    Collection Time: 08/10/21  7:55 PM   Result Value Ref Range    Hemoglobin 8.4 (L) 11.5 - 15.5 g/dL    Hematocrit 27.8 (L) 34.0 - 48.0 %   Basic Metabolic Panel w/ Reflex to MG    Collection Time: 08/11/21  2:40 AM   Result Value Ref Range    Sodium 140 132 - 146 mmol/L    Potassium reflex Magnesium 3.9 3.5 - 5.0 mmol/L    Chloride 111 (H) 98 - 107 mmol/L    CO2 20 (L) 22 - 29 mmol/L    Anion Gap 9 7 - 16 mmol/L    Glucose 85 74 - 99 mg/dL    BUN 20 6 - 23 mg/dL    CREATININE 1.5 (H) 0.5 - 1.0 mg/dL    GFR Non-African American 34 >=60 mL/min/1.73    GFR African American 41     Calcium 8.4 (L) 8.6 - 10.2 mg/dL   CBC auto differential    Collection Time: 08/11/21  2:40 AM   Result Value Ref Range    WBC 7.9 4.5 - 11.5 E9/L    RBC 2.47 (L) 3.50 - 5.50 E12/L    Hemoglobin 7.7 (L) 11.5 - 15.5 g/dL    Hematocrit 25.9 (L) 34.0 - 48.0 %    .9 (H) 80.0 - 99.9 fL    MCH 31.2 26.0 - 35.0 pg    MCHC 29.7 (L) 32.0 - 34.5 %    RDW 17.0 (H) 11.5 - 15.0 fL    Platelets 630 449 - 242 E9/L    MPV 9.5 7.0 - 12.0 fL    Neutrophils % 72.7 43.0 - 80.0 %    Immature Granulocytes % 0.4 0.0 - 5.0 %    Lymphocytes % 15.2 (L) 20.0 - 42.0 %    Monocytes % 8.6 2.0 - 12.0 %    Eosinophils % 2.5 0.0 - 6.0 %    Basophils % 0.6 0.0 - 2.0 %    Neutrophils Absolute 5.77 1.80 - 7.30 E9/L    Immature Granulocytes # 0.03 E9/L    Lymphocytes Absolute 1.21 (L) 1.50 - 4.00 E9/L    Monocytes Absolute 0.68 0.10 - 0.95 E9/L    Eosinophils Absolute 0.20 0.05 - 0.50 E9/L    Basophils Absolute 0.05 0.00 - 0.20 E9/L   Hemoglobin and hematocrit, blood    Collection Time: 08/11/21  9:00 AM   Result Value Ref Range    Hemoglobin 8.1 (L) 11.5 - 15.5 g/dL    Hematocrit 27.3 (L) 34.0 - 48.0 %       Radiology and other tests reviewed:  XR CHEST (2 VW) Final Result   1. Hazy infiltrates, large in the right lung and small in the left lung. 2.  New small bilateral pleural effusions and there may be an underlying   component of mild congestion. 3.   2 nodular masses in the right upper lobe, chronic. Assessment:  Active Hospital Problems    Diagnosis Date Noted    Chronic obstructive pulmonary disease ([facility] [J44.9]     Shortness of breath [R06.02]     Anemia due to blood loss, acute [D62]     GI bleed [K92.2] 08/09/2021    Acute kidney injury superimposed on CKD ([facility] [N17.9, N18.9] 08/09/2021    Bradycardia [R00.1] 08/09/2021    Hyperlipidemia [E78.5]     Atrial fibrillation ([facility] [I48.91]        Plan:    1. GI Bleed  Hemoccult positive stool in ER, no evidence of bleed on EGD and C-scope - source undetermined. Hgb mildly macrocytic. Dropped to 7.6 today from 8.1 yesterday. -Iron studies WNL   - Vit B12 and Folate pending.  -Trend H/h q6h, transfuse below 7  -EGD 8/10 --> no evidence Upper GI Bleed  -C-scope 8/11 -> No evidence of Lower GI bleed, remarkable for internal hemorrhoids on retroflexion.  -General Surgery on board, Dr. Robyn Moreland per PCP request, appreciate input  -Gen Surg plans to do capsule endoscopy outpatient basis    2. LAISHA on CKD, improving  -Presume decreased effective renal perfusion source given ongoing GI losses  -FEUrea 33.3% --> suggest pre-renal disease  -Caution with excess fluids given history of diastolic HF  -Hold Home Lasix and Spironolactone     3. Bradycardia  Appears to be near baseline with HR in the 50s. Hx of Afib.  Last Lopressor dose was 2052 8/11. Another dose at 0900.  -Telemetry monitoring  -Consider further workup if further decrease or change in symptoms, may need to hold Lopressor if HR drops below 44.     4. RLL Infiltrate, SOB  CXR 8/11 finding of hazy infiltrates, large in the right lung and small in the left lung.  1.2cm nodular mass/density in inferolateral RUL matching nodular density of prior CT chest 1/28/2020. Vitals, WBCs, and lung physical exam stable. - Duoneb breathing treatment ordered. - Will [facility] if SOB worsens or change in symptoms.      5. Hx of Afib  -Bradycardia (HR 51)  -Lopressor 12.5 mg BID  -Amiodarone 200 mg daily  -Anticoagulation with Xarelto 15 mg  -Monitor vitals     6. Hx of CVA  -Anticoagulation with Xarelto 15 mg    7. HLD  -Atorvastatin 40 mg daily     8.  Hx of Pulmonary Emphysema  -PRN Albuterol, not currently on controller    DVT ppx: Xarelto oral tablet 15 mg  GI ppx: None  Code Status: Full  Diet: General diet      Disposition: Discharge to [address] [place] [address] Student   08/11/21   2:38 PM

## 2021-08-12 VITALS
HEART RATE: 50 BPM | SYSTOLIC BLOOD PRESSURE: 120 MMHG | HEIGHT: 66 IN | OXYGEN SATURATION: 97 % | DIASTOLIC BLOOD PRESSURE: 55 MMHG | TEMPERATURE: 98.1 F | WEIGHT: 128.5 LBS | RESPIRATION RATE: 16 BRPM | BODY MASS INDEX: 20.65 KG/M2

## 2021-08-12 LAB
ANION GAP SERPL CALCULATED.3IONS-SCNC: 11 MMOL/L (ref 7–16)
BASOPHILS ABSOLUTE: 0.04 E9/L (ref 0–0.2)
BASOPHILS RELATIVE PERCENT: 0.5 % (ref 0–2)
BUN BLDV-MCNC: 20 MG/DL (ref 6–23)
CALCIUM SERPL-MCNC: 8.5 MG/DL (ref 8.6–10.2)
CHLORIDE BLD-SCNC: 107 MMOL/L (ref 98–107)
CO2: 18 MMOL/L (ref 22–29)
CREAT SERPL-MCNC: 1.4 MG/DL (ref 0.5–1)
EOSINOPHILS ABSOLUTE: 0.13 E9/L (ref 0.05–0.5)
EOSINOPHILS RELATIVE PERCENT: 1.6 % (ref 0–6)
FOLATE: 13.7 NG/ML (ref 4.8–24.2)
GFR AFRICAN AMERICAN: 45
GFR NON-AFRICAN AMERICAN: 37 ML/MIN/1.73
GLUCOSE BLD-MCNC: 95 MG/DL (ref 74–99)
HCT VFR BLD CALC: 25 % (ref 34–48)
HCT VFR BLD CALC: 28 % (ref 34–48)
HEMOGLOBIN: 7.6 G/DL (ref 11.5–15.5)
HEMOGLOBIN: 8.4 G/DL (ref 11.5–15.5)
IMMATURE GRANULOCYTES #: 0.04 E9/L
IMMATURE GRANULOCYTES %: 0.5 % (ref 0–5)
LYMPHOCYTES ABSOLUTE: 1.22 E9/L (ref 1.5–4)
LYMPHOCYTES RELATIVE PERCENT: 15.4 % (ref 20–42)
MCH RBC QN AUTO: 31.4 PG (ref 26–35)
MCHC RBC AUTO-ENTMCNC: 30.4 % (ref 32–34.5)
MCV RBC AUTO: 103.3 FL (ref 80–99.9)
MONOCYTES ABSOLUTE: 0.66 E9/L (ref 0.1–0.95)
MONOCYTES RELATIVE PERCENT: 8.3 % (ref 2–12)
NEUTROPHILS ABSOLUTE: 5.82 E9/L (ref 1.8–7.3)
NEUTROPHILS RELATIVE PERCENT: 73.7 % (ref 43–80)
PDW BLD-RTO: 16.9 FL (ref 11.5–15)
PLATELET # BLD: 218 E9/L (ref 130–450)
PMV BLD AUTO: 10.3 FL (ref 7–12)
POTASSIUM REFLEX MAGNESIUM: 4.4 MMOL/L (ref 3.5–5)
RBC # BLD: 2.42 E12/L (ref 3.5–5.5)
SODIUM BLD-SCNC: 136 MMOL/L (ref 132–146)
VITAMIN B-12: >2000 PG/ML (ref 211–946)
WBC # BLD: 7.9 E9/L (ref 4.5–11.5)

## 2021-08-12 PROCEDURE — 84165 PROTEIN E-PHORESIS SERUM: CPT

## 2021-08-12 PROCEDURE — 36415 COLL VENOUS BLD VENIPUNCTURE: CPT

## 2021-08-12 PROCEDURE — 85014 HEMATOCRIT: CPT

## 2021-08-12 PROCEDURE — 6370000000 HC RX 637 (ALT 250 FOR IP): Performed by: STUDENT IN AN ORGANIZED HEALTH CARE EDUCATION/TRAINING PROGRAM

## 2021-08-12 PROCEDURE — 80048 BASIC METABOLIC PNL TOTAL CA: CPT

## 2021-08-12 PROCEDURE — 85025 COMPLETE CBC W/AUTO DIFF WBC: CPT

## 2021-08-12 PROCEDURE — 82607 VITAMIN B-12: CPT

## 2021-08-12 PROCEDURE — 6370000000 HC RX 637 (ALT 250 FOR IP): Performed by: SURGERY

## 2021-08-12 PROCEDURE — 94640 AIRWAY INHALATION TREATMENT: CPT

## 2021-08-12 PROCEDURE — 6370000000 HC RX 637 (ALT 250 FOR IP): Performed by: FAMILY MEDICINE

## 2021-08-12 PROCEDURE — 82746 ASSAY OF FOLIC ACID SERUM: CPT

## 2021-08-12 PROCEDURE — 85018 HEMOGLOBIN: CPT

## 2021-08-12 PROCEDURE — 99232 SBSQ HOSP IP/OBS MODERATE 35: CPT | Performed by: FAMILY MEDICINE

## 2021-08-12 PROCEDURE — 2580000003 HC RX 258: Performed by: SURGERY

## 2021-08-12 RX ORDER — UBIDECARENONE 75 MG
500 CAPSULE ORAL DAILY
Status: DISCONTINUED | OUTPATIENT
Start: 2021-08-12 | End: 2021-08-12 | Stop reason: HOSPADM

## 2021-08-12 RX ORDER — FERROUS SULFATE 325(65) MG
325 TABLET ORAL 2 TIMES DAILY WITH MEALS
Qty: 30 TABLET | Refills: 1 | Status: SHIPPED | OUTPATIENT
Start: 2021-08-12 | End: 2022-04-13

## 2021-08-12 RX ORDER — FOLIC ACID 1 MG/1
1 TABLET ORAL DAILY
Qty: 30 TABLET | Refills: 0 | Status: SHIPPED | OUTPATIENT
Start: 2021-08-12 | End: 2022-10-18

## 2021-08-12 RX ORDER — FOLIC ACID 1 MG/1
1 TABLET ORAL DAILY
Status: DISCONTINUED | OUTPATIENT
Start: 2021-08-12 | End: 2021-08-12 | Stop reason: HOSPADM

## 2021-08-12 RX ORDER — FERROUS SULFATE 325(65) MG
325 TABLET ORAL 2 TIMES DAILY WITH MEALS
Status: DISCONTINUED | OUTPATIENT
Start: 2021-08-12 | End: 2021-08-12 | Stop reason: HOSPADM

## 2021-08-12 RX ADMIN — IPRATROPIUM BROMIDE AND ALBUTEROL SULFATE 1 AMPULE: .5; 3 SOLUTION RESPIRATORY (INHALATION) at 07:49

## 2021-08-12 RX ADMIN — AMIODARONE HYDROCHLORIDE 200 MG: 200 TABLET ORAL at 09:24

## 2021-08-12 RX ADMIN — ATORVASTATIN CALCIUM 40 MG: 40 TABLET, FILM COATED ORAL at 09:24

## 2021-08-12 RX ADMIN — METOPROLOL TARTRATE 12.5 MG: 25 TABLET, FILM COATED ORAL at 09:24

## 2021-08-12 RX ADMIN — FERROUS SULFATE TAB 325 MG (65 MG ELEMENTAL FE) 325 MG: 325 (65 FE) TAB at 09:24

## 2021-08-12 RX ADMIN — SODIUM CHLORIDE, PRESERVATIVE FREE 10 ML: 5 INJECTION INTRAVENOUS at 09:25

## 2021-08-12 RX ADMIN — Medication 500 MCG: at 11:13

## 2021-08-12 RX ADMIN — FOLIC ACID 1 MG: 1 TABLET ORAL at 11:13

## 2021-08-12 RX ADMIN — IPRATROPIUM BROMIDE AND ALBUTEROL SULFATE 1 AMPULE: .5; 3 SOLUTION RESPIRATORY (INHALATION) at 12:08

## 2021-08-12 ASSESSMENT — PAIN SCALES - GENERAL: PAINLEVEL_OUTOF10: 0

## 2021-08-12 ASSESSMENT — ENCOUNTER SYMPTOMS: ANAL BLEEDING: 0

## 2021-08-12 NOTE — PROGRESS NOTES
GENERAL SURGERY  DAILY PROGRESS NOTE  8/12/2021    Subjective:  Denies abdominal pain, denies N/V. States that she has not noticed blood in her stool. Has no further complaints. ROS negative except as stated above. Objective:  BP (!) 110/52   Pulse 51   Temp 98.1 °F (36.7 °C) (Oral)   Resp 15   Ht 5' 6\" (1.676 m)   Wt 128 lb 8 oz (58.3 kg)   SpO2 97%   BMI 20.74 kg/m²     GENERAL:  No acute distress. Alert and interactive. Oriented x3. LUNGS:  No cough. Nonlabored breathing on room air. CARDIOVASC:  Normal rate, no cyanosis. ABDOMEN:  Soft, non-distended, non-tender. No guarding / rigidity / rebound. EXTREMITIES: Moves all extremities. No swelling or edema. Assessment/Plan:  66 y.o. female presenting with GIB. - Monitor H/H  - Hemoglobin stable for D/C   - Capsule endoscopy as outpatient    Plan will be discussed with Dr. Luzma Restrepo.     Electronically signed by Pacheco Oliva MD on 8/12/2021 at 7:27 AM

## 2021-08-12 NOTE — DISCHARGE SUMMARY
[address]  Discharge Summary      Patient ID:  Sri Encarnacion  [id number]  75 y.o.  1950    Admit date: 8/9/2021    Discharge date and time: 8/12/2021    Location of discharge: [facility]     Admitting Physician: Pattie Lake MD     Discharge Physician: Purnima Shah DO    Consults: general surgery    Admission Diagnoses: GI bleed [K92.2]    Discharge Diagnoses: Active Problems:    Atrial fibrillation (HCC)    Hyperlipidemia    GI bleed    Acute kidney injury superimposed on CKD (HCC)    Bradycardia    Anemia due to blood loss, acute    Chronic obstructive pulmonary disease (HCC)    Shortness of breath  Resolved Problems:    * No resolved hospital problems. *      Hospital Course:   Sri Encarnacion  is a 66 y.o. female patient of Nu Resendiz DO  with a pertinent PMHx of CVA, Cardiomyopathy, Afib on Pradaxa, HTN, HLD who presented to the [facility] ER  with chief complaint of rectal bleeding and shortness of breath. In the ER she was found to have Hemoccult positive stool without clinical evidence of melanotic stool. She had an LAISHA with a creatinine of 1.9 on a baseline of 1.2. She was also significantly anemic with hemoglobin of 9.3 from a previous reading of over 12. She has no documented or stated history of anemia. Family medicine admitted the patient. General surgery was consulted during her admission. They performed both an EGD and colonoscopy which both did not show any overt causes of bleeding. Colonoscopy did show some internal hemorrhoids but otherwise was negative. General surgery plans to perform capsule endoscopy outpatient. Patient's hemoglobin was consistently low between 7.6 and 9.3. Patient was not transfused during admission. CXR was obtained for patient's shortness of breath. CXR showed stable nodular masses in the right upper lobe of the lung but new small bilateral pleural effusions and hazy infiltrates. [facility] and will see them after discharge. Gentle hydration slightly improved patient's LAISHA. Patient advised to obtain CBC, BMP, CT Chest soon after discharge and have close follow up with PCP. SPEP and UPEP results pending on discharge. Discharge Exam:     Physical Exam  Cardiovascular:      Rate and Rhythm: Regular rhythm. Bradycardia present. Heart sounds: Normal heart sounds. No murmur heard. No gallop. Pulmonary:      Effort: Pulmonary effort is normal.      Breath sounds: Normal breath sounds. No wheezing or rhonchi. Abdominal:      General: Bowel sounds are normal. There is no distension. Palpations: Abdomen is soft. Tenderness: There is no abdominal tenderness. Genitourinary:     Rectum: Guaiac result positive (in ED). Musculoskeletal:      Cervical back: No tenderness. Right lower leg: No edema. Left lower leg: No edema. Lymphadenopathy:      Cervical: No cervical adenopathy. Neurological:      Mental Status: She is alert. Post Discharge Follow Up Issues:  -F/U with Pulmonologist at [facility] regarding CXR  -F/U with PCP  -F/U with Cardiologist at [facility]  -F/U Gen Surg as outpatient for capsule endoscopy  -CBC, BMP, CT chest within next week as outpatient    Discharged Condition: stable    Patient was discharged in a stable and improved condition. Significant Diagnostic Studies:   XR CHEST (2 VW)   Final Result   1. Hazy infiltrates, large in the right lung and small in the left lung. 2.  New small bilateral pleural effusions and there may be an underlying   component of mild congestion. 3.   2 nodular masses in the right upper lobe, chronic.               Treatments: IV hydration and procedures: EGD, Colonoscopy    Disposition: home    Patient Instructions:      Medication List      START taking these medications    cyanocobalamin 500 MCG tablet  Take 1 tablet by mouth daily     ferrous sulfate 325 (65 Fe) MG tablet  Commonly known as: IRON 325  Take 1 tablet by mouth 2 times daily (with meals)     folic acid 1 MG tablet  Commonly known as: FOLVITE  Take 1 tablet by mouth daily        CHANGE how you take these medications    metoprolol tartrate 25 MG tablet  Commonly known as: LOPRESSOR  Take 0.5 tablets by mouth 2 times daily  What changed: how much to take     rivaroxaban 15 MG Tabs tablet  Commonly known as: XARELTO  Take 1 tablet by mouth Daily with supper  What changed:   · medication strength  · how much to take  · when to take this        CONTINUE taking these medications    albuterol sulfate  (90 Base) MCG/ACT inhaler  Commonly known as: Ventolin HFA  Inhale 2 puffs into the lungs 4 times daily as needed for Wheezing     amiodarone 200 MG tablet  Commonly known as: CORDARONE     atorvastatin 40 MG tablet  Commonly known as: LIPITOR     furosemide 40 MG tablet  Commonly known as: LASIX     Handicap Placard Misc  by Does not apply route Duration: Lifetime     spironolactone 25 MG tablet  Commonly known as: ALDACTONE           Where to Get Your Medications      These medications were sent to [address], [address] [place] [address] [phone]  [facility] Gomez , [facility]    Phone: [phone]   · cyanocobalamin 500 MCG tablet  · ferrous sulfate 325 (65 Fe) MG tablet  · folic acid 1 MG tablet  · metoprolol tartrate 25 MG tablet  · rivaroxaban 15 MG Tabs tablet       Activity: activity as tolerated  Diet: regular diet    Shannen Bagley DO  [address] [facility]   [address] [id number]    Call in 1 day  Hospital F/U    Effie Simon MD  [address] [place] [address]  [phone]    Call in 1 day  Hospital F/U    [address]    In 3 days  Hospital Follow-up      Signed:  Robert Pastor DO  8/12/2021  10:59 AM

## 2021-08-12 NOTE — PROGRESS NOTES
[facility] 450  Progress Note      Chief Complaint: Rectal Bleeding, SOB    Subjective:    Rodri Javed was seen this morning in room laying comfortably in bed. She reports no acute problems overnight. She denies any blood in her stool. She states her shortness of breath on exertion is overall unchanged and plans to follow with her pulmonologist on discharge. Denies any chest pain, lightheadedness, syncope, fever, or chills. Review of Systems   Constitutional: Negative for chills, fatigue and fever. Respiratory: Positive for shortness of breath (on movement). Negative for cough. Cardiovascular: Negative for chest pain and leg swelling. Gastrointestinal: Negative for abdominal pain, anal bleeding and nausea. Neurological: Negative for syncope and light-headedness. Objective:    BP (!) 120/55   Pulse 53   Temp 98.1 °F (36.7 °C) (Oral)   Resp 16   Ht 5' 6\" (1.676 m)   Wt 128 lb 8 oz (58.3 kg)   SpO2 97%   BMI 20.74 kg/m²       Physical Exam  Cardiovascular:      Rate and Rhythm: Regular rhythm. Bradycardia present. Heart sounds: Normal heart sounds. No murmur heard. No gallop. Pulmonary:      Effort: Pulmonary effort is normal.      Breath sounds: Normal breath sounds. No wheezing or rhonchi. Abdominal:      General: Bowel sounds are normal. There is no distension. Palpations: Abdomen is soft. Tenderness: There is no abdominal tenderness. Genitourinary:     Rectum: Guaiac result positive (in ED). Musculoskeletal:      Cervical back: No tenderness. Right lower leg: No edema. Left lower leg: No edema. Lymphadenopathy:      Cervical: No cervical adenopathy. Neurological:      Mental Status: She is alert.            CBC:   Lab Results   Component Value Date    WBC 7.9 08/12/2021    RBC 2.42 08/12/2021    HGB 7.6 08/12/2021    HCT 25.0 08/12/2021    .3 08/12/2021    MCH 31.4 08/12/2021    MCHC 30.4 08/12/2021 RDW 16.9 08/12/2021     08/12/2021    MPV 10.3 08/12/2021     CMP:    Lab Results   Component Value Date     08/12/2021    K 4.4 08/12/2021     08/12/2021    CO2 18 08/12/2021    BUN 20 08/12/2021    CREATININE 1.4 08/12/2021    GFRAA 45 08/12/2021    LABGLOM 37 08/12/2021    GLUCOSE 95 08/12/2021    GLUCOSE 89 02/15/2011    PROT 7.4 08/09/2021    LABALBU 4.1 08/09/2021    LABALBU 3.7 02/15/2011    CALCIUM 8.5 08/12/2021    BILITOT 1.2 08/09/2021    ALKPHOS 53 08/09/2021    AST 24 08/09/2021    ALT 15 08/09/2021        Assessment:    Active Hospital Problems    Diagnosis Date Noted    Chronic obstructive pulmonary disease ([facility] [J44.9]     Shortness of breath [R06.02]     Anemia due to blood loss, acute [D62]     GI bleed [K92.2] 08/09/2021    Acute kidney injury superimposed on CKD ([facility] [N17.9, N18.9] 08/09/2021    Bradycardia [R00.1] 08/09/2021    Hyperlipidemia [E78.5]     Atrial fibrillation (HCC) [I48.91]        Plan:     GI Bleed  Given hemmocult positive stool and ongoing rectal bleeding presume lower GI source but may be mixed component given LAISHA  -Iron studies WNL  -Trend H/h q6h, transfuse below 7  -Gen Surgery following - Dr. Tyler Rousseau per PCP request, appreciate input  -Last Cscope multiple years ago only remarkable for hemorrhoid  -EGD 8/10 --> no evidence Upper GI Bleed  -C-scope 8/11 --> no evidence Lower GI bleed  -Gen Surg plans for capsule endoscopy outpatient     LAISHA on CKD, improving  Cr elevated to 1.4 from baseline of around 1.2  -Continue to monitor, will recheck outpatient  -IVF 75 mL/hour  -Caution with excess fluids given history of diastolic HF  -Hold Home Lasix and Spironolactone     Bradycardia  Appears to be near baseline with HR in the 50s in ER although initial vitals inpatient documented HR of 45. Documented history of Afib.   Did take a dose of Lopressor on route to [facility] from [address]  -Telemetry monitoring  -Decreased Lopressor dose to 12.5 mg BID, if HR <40 hold     Hx of Afib  -Bradycardia (HR 52)  -Lopressor 12.5 mg BID  -Amiodarone 200 mg daily  -Xarelto 15 mg daily  -Monitor vitals     HLD  -Atorvastatin 40 mg daily     Hx of Pulmonary Emphysema  -PRN Albuterol, not currently on controller      Code Status: Full  Diet: NPO for Colonoscopy  DVT Ppx: Xarelto 15 mg daily  Gi Ppx: None      Disposition:  D/C today    Jamaal Meek DO  Family Medicine Resident PGY-1

## 2021-08-12 NOTE — PROGRESS NOTES
Reviewed AVS with Duyen Parson and her . All questions answered. Belongings with patient. Pt being discharged home in stable condition.

## 2021-08-12 NOTE — PROGRESS NOTES
Reached out to family medicine for discharge order via perfect serve. Awaiting repeat Hgb and possible discharge this afternoon.

## 2021-08-12 NOTE — PLAN OF CARE
Problem: Infection:  Goal: Will remain free from infection  Description: Will remain free from infection  Outcome: Completed     Problem: Safety:  Goal: Free from accidental physical injury  Description: Free from accidental physical injury  Outcome: Completed  Goal: Free from intentional harm  Description: Free from intentional harm  Outcome: Completed     Problem: Daily Care:  Goal: Daily care needs are met  Description: Daily care needs are met  Outcome: Completed     Problem: Pain:  Goal: Patient's pain/discomfort is manageable  Description: Patient's pain/discomfort is manageable  Outcome: Completed     Problem: Skin Integrity:  Goal: Skin integrity will stabilize  Description: Skin integrity will stabilize  Outcome: Completed     Problem: Discharge Planning:  Goal: Patients continuum of care needs are met  Description: Patients continuum of care needs are met  Outcome: Completed

## 2021-08-13 ENCOUNTER — TELEPHONE (OUTPATIENT)
Dept: FAMILY MEDICINE CLINIC | Age: 71
End: 2021-08-13

## 2021-08-13 NOTE — TELEPHONE ENCOUNTER
[facility] Transitions Initial Follow Up Call    Outreach made within 2 business days of discharge: Yes    Patient: Delmer Boy Patient : 1950   MRN: [id number]  Reason for Admission: There are no discharge diagnoses documented for the most recent discharge. Discharge Date: 21       Spoke with: left message   2nd attempt- left another message and reminded her of the - visit.      Discharge department/facility: [facility]         Scheduled appointment with PCP within 7-14 days    Follow Up  Future Appointments   Date Time Provider Rita Roe   2021 10:00 AM SCHEDULE, COMPLETE [facility]   2021 11:00 AM Nu Hidalgo DO [facility]       Lela Martinez

## 2021-08-13 NOTE — TELEPHONE ENCOUNTER
----- Message from Fanny Lopez sent at 8/13/2021 10:38 AM EDT -----  Subject: Hospital Follow Up    QUESTIONS  What hospital [facility] the [facility]  Date of Discharge? 2021-08-12  Discharge Location? Home  Reason for hospitalization as patient stated? blood in stool  What question does the patient have, if applicable?   ---------------------------------------------------------------------------  --------------  CALL BACK INFO  What is the best way for the office to contact you? OK to leave message on   voicemail  Preferred Call Back Phone Number? [phone]  ---------------------------------------------------------------------------  --------------  SCRIPT ANSWERS  Relationship to Patient? Self  (Patient requests to see provider urgently. )? No  (Has the patient been discharged from the hospital within 2 business days   AND does not have a Telephone Encounter  Follow Up From [address] [place] [address]   documented in [address]?)? Yes  Do you have any questions for your primary care provider that need to be   answered prior to your appointment? (Use RN Triage if question pertains to   anything on the red flag list)? No  (Patient needs follow up visit after hospital discharge) Book first   available appointment within 7 days OF DISCHARGE, if no appt, proceed to   book the next available time slot within 14 days OF DISCHARGE AND Send   Message to Provider. [address] [facility] Follow Up appointment cannot be booked   beyond 14 Days and should result in a Message to Provider. ?  No

## 2021-08-16 NOTE — ANESTHESIA POSTPROCEDURE EVALUATION
Department of Anesthesiology  Postprocedure Note    Patient: Charles Mckeon  MRN: [id number]  YOB: 1950  Date of evaluation: 8/16/2021  Time:  8:06 AM     Procedure Summary     Date: 08/11/21 Room / Location: [address] [place] [address]    Anesthesia Start: 7251 Anesthesia Stop: 0530    Procedure: COLONOSCOPY DIAGNOSTIC (N/A ) Diagnosis: (/)    Surgeons: Tiana Baez MD Responsible Provider: Marcelo Berman MD    Anesthesia Type: MAC ASA Status: 4          Anesthesia Type: MAC    Lizett Phase I:      Lizett Phase II: Lizett Score: 10    Last vitals: Reviewed and per EMR flowsheets.        Anesthesia Post Evaluation    Patient location during evaluation: PACU  Patient participation: complete - patient participated  Level of consciousness: awake and alert  Airway patency: patent  Nausea & Vomiting: no vomiting and no nausea  Complications: no  Cardiovascular status: blood pressure returned to baseline  Respiratory status: acceptable  Hydration status: euvolemic

## 2021-08-17 LAB
ALBUMIN SERPL-MCNC: 3.1 G/DL (ref 3.5–4.7)
ALPHA-1-GLOBULIN: 0.2 G/DL (ref 0.2–0.4)
ALPHA-2-GLOBULIN: 0.7 G/FL (ref 0.5–1)
BETA GLOBULIN: 1 G/DL (ref 0.8–1.3)
ELECTROPHORESIS: ABNORMAL
GAMMA GLOBULIN: 1 G/DL (ref 0.7–1.6)
TOTAL PROTEIN: 6 G/DL (ref 6.4–8.3)

## 2021-08-19 ENCOUNTER — OFFICE VISIT (OUTPATIENT)
Dept: FAMILY MEDICINE CLINIC | Age: 71
End: 2021-08-19
Payer: MEDICARE

## 2021-08-19 VITALS
OXYGEN SATURATION: 92 % | HEART RATE: 55 BPM | SYSTOLIC BLOOD PRESSURE: 90 MMHG | TEMPERATURE: 97.3 F | DIASTOLIC BLOOD PRESSURE: 60 MMHG | HEIGHT: 66 IN | WEIGHT: 129 LBS | BODY MASS INDEX: 20.73 KG/M2 | RESPIRATION RATE: 14 BRPM

## 2021-08-19 DIAGNOSIS — D62 ANEMIA DUE TO BLOOD LOSS, ACUTE: ICD-10-CM

## 2021-08-19 DIAGNOSIS — Z12.31 ENCOUNTER FOR SCREENING MAMMOGRAM FOR MALIGNANT NEOPLASM OF BREAST: ICD-10-CM

## 2021-08-19 DIAGNOSIS — K92.2 GASTROINTESTINAL HEMORRHAGE, UNSPECIFIED GASTROINTESTINAL HEMORRHAGE TYPE: ICD-10-CM

## 2021-08-19 DIAGNOSIS — I48.91 ATRIAL FIBRILLATION, UNSPECIFIED TYPE (HCC): Primary | ICD-10-CM

## 2021-08-19 PROCEDURE — 1111F DSCHRG MED/CURRENT MED MERGE: CPT | Performed by: FAMILY MEDICINE

## 2021-08-19 PROCEDURE — 99214 OFFICE O/P EST MOD 30 MIN: CPT | Performed by: FAMILY MEDICINE

## 2021-08-19 ASSESSMENT — PATIENT HEALTH QUESTIONNAIRE - PHQ9
SUM OF ALL RESPONSES TO PHQ QUESTIONS 1-9: 0
SUM OF ALL RESPONSES TO PHQ QUESTIONS 1-9: 0
1. LITTLE INTEREST OR PLEASURE IN DOING THINGS: 0
2. FEELING DOWN, DEPRESSED OR HOPELESS: 0
SUM OF ALL RESPONSES TO PHQ QUESTIONS 1-9: 0
SUM OF ALL RESPONSES TO PHQ9 QUESTIONS 1 & 2: 0

## 2021-08-19 NOTE — PROGRESS NOTES
Post-Discharge Transitional Care Management Services or Hospital Follow Up      [address]   YOB: 1950    Date of Office Visit:  8/19/2021  Date of Hospital Admission: 8/9/21  Date of Hospital Discharge: 8/12/21  Risk of hospital readmission (high >=14%.  Medium >=10%) :Readmission Risk Score: 18      Care management risk score Rising risk (score 2-5) and Complex Care (Scores >=6): 1     Non face to face  following discharge, date last encounter closed (first attempt may have been earlier): *No documented post hospital discharge outreach found in the last 14 days    Call initiated 2 business days of discharge: *No response recorded in the last 14 days    Patient Active Problem List   Diagnosis    Varicose veins of both lower extremities    Spider veins    PVD (peripheral vascular disease) with claudication ([address]    Atrial fibrillation ([address]    Cardiomyopathy ([address]    Hyperlipidemia    Hypertension    Primary osteoarthritis of right knee    Primary osteoarthritis of right hip    Cancer of trachea, bronchus, and lung ([address]    Pulmonary emphysema ([address]    GI bleed    Acute kidney injury superimposed on CKD ([address]    Bradycardia    Anemia due to blood loss, acute    Chronic obstructive pulmonary disease (HCC)    Shortness of breath       Allergies   Allergen Reactions    Kenalog [Triamcinolone Acetonide] Other (See Comments)     Flushing of face after Rt knee injection 5/4/2020       Medications listed as ordered at the time of discharge from [facility] Medication Instructions ZEHRA:    Printed on:09/07/21 1683   Medication Information                      albuterol sulfate HFA (VENTOLIN HFA) 108 (90 Base) MCG/ACT inhaler  Inhale 2 puffs into the lungs 4 times daily as needed for Wheezing             amiodarone (CORDARONE) 200 MG tablet  Take 200 mg by mouth daily             atorvastatin (LIPITOR) 40 MG tablet  Take 40 mg by mouth daily ferrous sulfate (IRON 325) 325 (65 Fe) MG tablet  Take 1 tablet by mouth 2 times daily (with meals)             folic acid (FOLVITE) 1 MG tablet  Take 1 tablet by mouth daily             furosemide (LASIX) 40 MG tablet  TAKE ONE TABLET BY MOUTH EVERY DAY             Handicap Placard MISC  by Does not apply route Duration: Lifetime             metoprolol tartrate (LOPRESSOR) 25 MG tablet  Take 0.5 tablets by mouth 2 times daily             rivaroxaban (XARELTO) 15 MG TABS tablet  Take 1 tablet by mouth Daily with supper             spironolactone (ALDACTONE) 25 MG tablet  Take 25 mg by mouth daily              vitamin B-12 500 MCG tablet  Take 1 tablet by mouth daily                   Medications marked \"taking\" at this time  Outpatient Medications Marked as Taking for the 8/19/21 encounter (Office Visit) with Greyson Hidalgo, DO   Medication Sig Dispense Refill    ferrous sulfate (IRON 325) 325 (65 Fe) MG tablet Take 1 tablet by mouth 2 times daily (with meals) 30 tablet 1    folic acid (FOLVITE) 1 MG tablet Take 1 tablet by mouth daily 30 tablet 0    vitamin B-12 500 MCG tablet Take 1 tablet by mouth daily 30 tablet 0    metoprolol tartrate (LOPRESSOR) 25 MG tablet Take 0.5 tablets by mouth 2 times daily 60 tablet 0    rivaroxaban (XARELTO) 15 MG TABS tablet Take 1 tablet by mouth Daily with supper 30 tablet 0    amiodarone (CORDARONE) 200 MG tablet Take 200 mg by mouth daily      albuterol sulfate HFA (VENTOLIN HFA) 108 (90 Base) MCG/ACT inhaler Inhale 2 puffs into the lungs 4 times daily as needed for Wheezing 3 Inhaler 1    furosemide (LASIX) 40 MG tablet TAKE ONE TABLET BY MOUTH EVERY DAY  3    atorvastatin (LIPITOR) 40 MG tablet Take 40 mg by mouth daily       Handicap Placard MISC by Does not apply route Duration: Lifetime 1 each 0    spironolactone (ALDACTONE) 25 MG tablet Take 25 mg by mouth daily           Medications patient taking as of now reconciled against medications ordered at time of hospital discharge: Yes    Chief Complaint   Patient presents with    Follow-Up from Hospital     bleeding from colon         History of Present illness - Follow up of Hospital diagnosis(es):  Admission Diagnoses: GI bleed [K92.2]     Discharge Diagnoses: Active Problems:    Atrial fibrillation (HCC)    Hyperlipidemia    GI bleed    Acute kidney injury superimposed on CKD (HCC)    Bradycardia    Anemia due to blood loss, acute    Chronic obstructive pulmonary disease (HCC)    Shortness of breath  Inpatient course: Discharge summary reviewed- see chart. Candice Gonzalez a 66 y. o. female patient of Nu Resendiz DO  with a pertinent PMHx of CVA, Cardiomyopathy, Afib on Pradaxa, HTN, HLD who [facility] chief complaint of rectal bleeding and shortness of breath. In the ER she was found to have Hemoccult positive stool without clinical evidence of melanotic stool. Roseanna Lux had an LAISHA with a creatinine of 1.9 on a baseline of 1.2.  She was also significantly anemic with hemoglobin of 9.3 from a previous reading of over 12.  She has no documented or stated history of anemia. Family medicine admitted the patient.     General surgery was consulted during her admission. They performed both an EGD and colonoscopy which both did not show any overt causes of bleeding. Colonoscopy did show some internal hemorrhoids but otherwise was negative. General surgery plans to perform capsule endoscopy outpatient. Patient's hemoglobin was consistently low between 7.6 and 9.3. Patient was not transfused during admission. CXR was obtained for patient's shortness of breath. CXR showed stable nodular masses in the right upper lobe of the lung but new small bilateral pleural effusions and hazy infiltrates. Patient follows with [facility] physician for pulmonology and will see them after discharge. Gentle hydration slightly improved patient's LAISHA.  Patient advised to obtain CBC, BMP, CT Chest soon after discharge and have close follow up with PCP.         SPEP and UPEP results pending on discharge.      Interval history/Current status: stable    A comprehensive review of systems was negative except for what was noted in the HPI. Vitals:    08/19/21 1306   BP: 90/60   Pulse: 55   Resp: 14   Temp: 97.3 °F (36.3 °C)   SpO2: 92%   Weight: 129 lb (58.5 kg)   Height: 5' 6\" (1.676 m)     Body mass index is 20.82 kg/m². Wt Readings from Last 3 Encounters:   08/19/21 129 lb (58.5 kg)   08/10/21 128 lb 8 oz (58.3 kg)   08/09/21 130 lb (59 kg)     BP Readings from Last 3 Encounters:   08/19/21 90/60   08/12/21 (!) 120/55   08/11/21 (!) 122/57        Physical Exam:  General Appearance: alert and oriented to person, place and time, well developed and well- nourished, in no acute distress  Skin: warm and dry, no rash or erythema  Head: normocephalic and atraumatic  Eyes: pupils equal, round, and reactive to light, extraocular eye movements intact, conjunctivae normal  ENT: tympanic membrane, external ear and ear canal normal bilaterally, nose without deformity, nasal mucosa and turbinates normal without polyps  Neck: supple and non-tender without mass, no thyromegaly or thyroid nodules, no cervical lymphadenopathy  Pulmonary/Chest: clear to auscultation bilaterally- no wheezes, rales or rhonchi, normal air movement, no respiratory distress  Cardiovascular: normal rate, regular rhythm, normal S1 and S2, no murmurs, rubs, clicks, or gallops, distal pulses intact, no carotid bruits  Abdomen: soft, non-tender, non-distended, normal bowel sounds, no masses or organomegaly  Extremities: no cyanosis, clubbing or edema  Musculoskeletal: normal range of motion, no joint swelling, deformity or tenderness  Neurologic: reflexes normal and symmetric, no cranial nerve deficit, gait, coordination and speech normal    Assessment/Plan:  1.  Atrial fibrillation, unspecified type ([address]    - [place] DISCHARGE MEDS RECONCILED W/ CURRENT OUTPATIENT MED LIST  - TSH without Reflex; Future    2. Gastrointestinal hemorrhage, unspecified gastrointestinal hemorrhage type    - [place] DISCHARGE MEDS RECONCILED W/ CURRENT OUTPATIENT MED LIST    3. Anemia due to blood loss, acute    - CBC; Future  - Comprehensive Metabolic Panel; Future  - RETICULOCYTES; Future    4. Encounter for screening mammogram for malignant neoplasm of breast    - [facility] DIGITAL SCREEN W OR WO CAD BILATERAL;  Future        Medical Decision Making: high complexity

## 2021-08-20 LAB
ALBUMIN SERPL-MCNC: ABNORMAL G/DL
ALP BLD-CCNC: ABNORMAL U/L
ALT SERPL-CCNC: ABNORMAL U/L
ANION GAP SERPL CALCULATED.3IONS-SCNC: ABNORMAL MMOL/L
AST SERPL-CCNC: ABNORMAL U/L
BASOPHILS ABSOLUTE: NORMAL
BASOPHILS RELATIVE PERCENT: NORMAL
BILIRUB SERPL-MCNC: 1.5 MG/DL (ref 0.1–1.4)
BUN BLDV-MCNC: ABNORMAL MG/DL
CALCIUM SERPL-MCNC: ABNORMAL MG/DL
CHLORIDE BLD-SCNC: ABNORMAL MMOL/L
CO2: ABNORMAL
CREAT SERPL-MCNC: ABNORMAL MG/DL
EOSINOPHILS ABSOLUTE: NORMAL
EOSINOPHILS RELATIVE PERCENT: NORMAL
GFR CALCULATED: ABNORMAL
GLUCOSE BLD-MCNC: 94 MG/DL
HCT VFR BLD CALC: NORMAL %
HEMOGLOBIN: NORMAL
LYMPHOCYTES ABSOLUTE: NORMAL
LYMPHOCYTES RELATIVE PERCENT: NORMAL
MCH RBC QN AUTO: NORMAL PG
MCHC RBC AUTO-ENTMCNC: NORMAL G/DL
MCV RBC AUTO: NORMAL FL
MONOCYTES ABSOLUTE: NORMAL
MONOCYTES RELATIVE PERCENT: NORMAL
NEUTROPHILS ABSOLUTE: NORMAL
NEUTROPHILS RELATIVE PERCENT: NORMAL
PLATELET # BLD: NORMAL 10*3/UL
PMV BLD AUTO: NORMAL FL
POTASSIUM SERPL-SCNC: 4.3 MMOL/L
RBC # BLD: NORMAL 10*6/UL
RETIC: 7.8
SODIUM BLD-SCNC: ABNORMAL MMOL/L
TOTAL PROTEIN: ABNORMAL
TSH SERPL DL<=0.05 MIU/L-ACNC: 6.07 UIU/ML
WBC # BLD: NORMAL 10*3/UL

## 2021-08-24 DIAGNOSIS — I48.91 ATRIAL FIBRILLATION, UNSPECIFIED TYPE (HCC): ICD-10-CM

## 2021-08-24 DIAGNOSIS — D62 ANEMIA DUE TO BLOOD LOSS, ACUTE: ICD-10-CM

## 2021-08-30 ENCOUNTER — TELEPHONE (OUTPATIENT)
Dept: FAMILY MEDICINE CLINIC | Age: 71
End: 2021-08-30

## 2021-08-31 DIAGNOSIS — R79.89 ELEVATED SERUM CREATININE: Primary | ICD-10-CM

## 2021-09-01 ENCOUNTER — HOSPITAL ENCOUNTER (OUTPATIENT)
Age: 71
Discharge: HOME | End: 2021-09-01
Payer: MEDICARE

## 2021-09-01 DIAGNOSIS — R79.89 ELEVATED SERUM CREATININE: ICD-10-CM

## 2021-09-01 LAB
ANION GAP SERPL CALCULATED.3IONS-SCNC: 11 MMOL/L (ref 7–16)
BASOPHILS ABSOLUTE: 0.06 E9/L (ref 0–0.2)
BASOPHILS RELATIVE PERCENT: 0.9 % (ref 0–2)
BUN BLDV-MCNC: 20 MG/DL (ref 6–23)
CALCIUM SERPL-MCNC: 9.9 MG/DL (ref 8.6–10.2)
CHLORIDE BLD-SCNC: 104 MMOL/L (ref 98–107)
CO2: 26 MMOL/L (ref 22–29)
CREAT SERPL-MCNC: 1.9 MG/DL (ref 0.5–1)
EOSINOPHILS ABSOLUTE: 0.19 E9/L (ref 0.05–0.5)
EOSINOPHILS RELATIVE PERCENT: 2.8 % (ref 0–6)
GFR AFRICAN AMERICAN: 31
GFR NON-AFRICAN AMERICAN: 26 ML/MIN/1.73
GLUCOSE BLD-MCNC: 107 MG/DL (ref 74–99)
HCT VFR BLD CALC: 33.6 % (ref 34–48)
HEMOGLOBIN: 10.2 G/DL (ref 11.5–15.5)
IMMATURE GRANULOCYTES #: 0.05 E9/L
IMMATURE GRANULOCYTES %: 0.7 % (ref 0–5)
LYMPHOCYTES ABSOLUTE: 1.05 E9/L (ref 1.5–4)
LYMPHOCYTES RELATIVE PERCENT: 15.3 % (ref 20–42)
MCH RBC QN AUTO: 32.1 PG (ref 26–35)
MCHC RBC AUTO-ENTMCNC: 30.4 % (ref 32–34.5)
MCV RBC AUTO: 105.7 FL (ref 80–99.9)
MONOCYTES ABSOLUTE: 0.73 E9/L (ref 0.1–0.95)
MONOCYTES RELATIVE PERCENT: 10.6 % (ref 2–12)
NEUTROPHILS ABSOLUTE: 4.79 E9/L (ref 1.8–7.3)
NEUTROPHILS RELATIVE PERCENT: 69.7 % (ref 43–80)
PDW BLD-RTO: 17.2 FL (ref 11.5–15)
PLATELET # BLD: 320 E9/L (ref 130–450)
PMV BLD AUTO: 8.8 FL (ref 7–12)
POTASSIUM SERPL-SCNC: 4.3 MMOL/L (ref 3.5–5)
RBC # BLD: 3.18 E12/L (ref 3.5–5.5)
SODIUM BLD-SCNC: 141 MMOL/L (ref 132–146)
T3 FREE: 2.1 PG/ML (ref 2–4.4)
T4 FREE: 1.53 NG/DL (ref 0.93–1.7)
WBC # BLD: 6.9 E9/L (ref 4.5–11.5)

## 2021-09-01 PROCEDURE — 80048 BASIC METABOLIC PNL TOTAL CA: CPT

## 2021-09-01 PROCEDURE — 84481 FREE ASSAY (FT-3): CPT

## 2021-09-01 PROCEDURE — 84439 ASSAY OF FREE THYROXINE: CPT

## 2021-09-01 PROCEDURE — 85025 COMPLETE CBC W/AUTO DIFF WBC: CPT

## 2021-09-01 PROCEDURE — 36415 COLL VENOUS BLD VENIPUNCTURE: CPT

## 2021-09-15 ENCOUNTER — TELEPHONE (OUTPATIENT)
Dept: FAMILY MEDICINE CLINIC | Age: 71
End: 2021-09-15

## 2021-09-15 DIAGNOSIS — I10 ESSENTIAL HYPERTENSION: Primary | ICD-10-CM

## 2021-09-15 NOTE — TELEPHONE ENCOUNTER
Patient going to [facility] tomorrow to have labs drawn, please order all the ones that is needed

## 2021-09-16 ENCOUNTER — HOSPITAL ENCOUNTER (OUTPATIENT)
Age: 71
Discharge: HOME | End: 2021-09-16
Payer: MEDICARE

## 2021-09-16 DIAGNOSIS — I10 ESSENTIAL HYPERTENSION: ICD-10-CM

## 2021-09-16 LAB
ANION GAP SERPL CALCULATED.3IONS-SCNC: 11 MMOL/L (ref 7–16)
BUN BLDV-MCNC: 23 MG/DL (ref 6–23)
CALCIUM SERPL-MCNC: 9.8 MG/DL (ref 8.6–10.2)
CHLORIDE BLD-SCNC: 104 MMOL/L (ref 98–107)
CO2: 26 MMOL/L (ref 22–29)
CREAT SERPL-MCNC: 1.8 MG/DL (ref 0.5–1)
GFR AFRICAN AMERICAN: 33
GFR NON-AFRICAN AMERICAN: 28 ML/MIN/1.73
GLUCOSE BLD-MCNC: 94 MG/DL (ref 74–99)
HCT VFR BLD CALC: 35.9 % (ref 34–48)
HEMOGLOBIN: 11.1 G/DL (ref 11.5–15.5)
MCH RBC QN AUTO: 32.3 PG (ref 26–35)
MCHC RBC AUTO-ENTMCNC: 30.9 % (ref 32–34.5)
MCV RBC AUTO: 104.4 FL (ref 80–99.9)
PDW BLD-RTO: 15.2 FL (ref 11.5–15)
PLATELET # BLD: 265 E9/L (ref 130–450)
PMV BLD AUTO: 9 FL (ref 7–12)
POTASSIUM SERPL-SCNC: 4.6 MMOL/L (ref 3.5–5)
RBC # BLD: 3.44 E12/L (ref 3.5–5.5)
SODIUM BLD-SCNC: 141 MMOL/L (ref 132–146)
WBC # BLD: 7.6 E9/L (ref 4.5–11.5)

## 2021-09-16 PROCEDURE — 80048 BASIC METABOLIC PNL TOTAL CA: CPT

## 2021-09-16 PROCEDURE — 85027 COMPLETE CBC AUTOMATED: CPT

## 2021-09-16 PROCEDURE — 36415 COLL VENOUS BLD VENIPUNCTURE: CPT

## 2021-09-20 ENCOUNTER — OFFICE VISIT (OUTPATIENT)
Dept: FAMILY MEDICINE CLINIC | Age: 71
End: 2021-09-20
Payer: MEDICARE

## 2021-09-20 VITALS
HEART RATE: 73 BPM | TEMPERATURE: 97.5 F | DIASTOLIC BLOOD PRESSURE: 78 MMHG | WEIGHT: 127 LBS | SYSTOLIC BLOOD PRESSURE: 118 MMHG | BODY MASS INDEX: 20.41 KG/M2 | RESPIRATION RATE: 16 BRPM | HEIGHT: 66 IN | OXYGEN SATURATION: 99 %

## 2021-09-20 DIAGNOSIS — Z00.00 ROUTINE GENERAL MEDICAL EXAMINATION AT A HEALTH CARE FACILITY: Primary | ICD-10-CM

## 2021-09-20 PROCEDURE — G0439 PPPS, SUBSEQ VISIT: HCPCS | Performed by: FAMILY MEDICINE

## 2021-09-20 ASSESSMENT — PATIENT HEALTH QUESTIONNAIRE - PHQ9
2. FEELING DOWN, DEPRESSED OR HOPELESS: 0
SUM OF ALL RESPONSES TO PHQ QUESTIONS 1-9: 0
1. LITTLE INTEREST OR PLEASURE IN DOING THINGS: 0
SUM OF ALL RESPONSES TO PHQ QUESTIONS 1-9: 0
SUM OF ALL RESPONSES TO PHQ9 QUESTIONS 1 & 2: 0
SUM OF ALL RESPONSES TO PHQ QUESTIONS 1-9: 0

## 2021-09-20 ASSESSMENT — LIFESTYLE VARIABLES: HOW OFTEN DO YOU HAVE A DRINK CONTAINING ALCOHOL: 0

## 2021-09-20 NOTE — PATIENT INSTRUCTIONS
Personalized Preventive Plan for Nahomy Hernández - 9/20/2021  Medicare offers a range of preventive health benefits. Some of the tests and screenings are paid in full while other may be subject to a deductible, co-insurance, and/or copay. Some of these benefits include a comprehensive review of your medical history including lifestyle, illnesses that may run in your family, and various assessments and screenings as appropriate. After reviewing your medical record and screening and assessments performed today your provider may have ordered immunizations, labs, imaging, and/or referrals for you. A list of these orders (if applicable) as well as your Preventive Care list are included within your After Visit Summary for your review. Other Preventive Recommendations:    · A preventive eye exam performed by an eye specialist is recommended every 1-2 years to screen for glaucoma; cataracts, macular degeneration, and other eye disorders. · A preventive dental visit is recommended every 6 months. · Try to get at least 150 minutes of exercise per week or 10,000 steps per day on a pedometer . · Order or download the FREE \"Exercise & Physical Activity: Your Everyday Guide\" from [employer]. Call 9-204.444.6921 or search [employer] online. · You need 1236-2930 mg of calcium and 3723-0379 IU of vitamin D per day. It is possible to meet your calcium requirement with diet alone, but a vitamin D supplement is usually necessary to meet this goal.  · When exposed to the sun, use a sunscreen that protects against both UVA and UVB radiation with an SPF of 30 or greater. Reapply every 2 to 3 hours or after sweating, drying off with a towel, or swimming. · Always wear a seat belt when traveling in a car. Always wear a helmet when riding a bicycle or motorcycle.

## 2021-09-20 NOTE — PROGRESS NOTES
Medicare Annual Wellness Visit  Name: Rosa Jara Date: 2021   MRN: [id number] Sex: Female   Age: 66 y.o. Ethnicity: Non- / Non    : 1950 Race: White (non-)      Jenna Shepherd is here for Medicare AWV (no complaints)    Screenings for behavioral, psychosocial and functional/safety risks, and cognitive dysfunction are all negative except as indicated below. These results, as well as other patient data from the [address] form, are documented in Flowsheets linked to this Encounter. Allergies   Allergen Reactions    Kenalog [Triamcinolone Acetonide] Other (See Comments)     Flushing of face after Rt knee injection 2020         Prior to Visit Medications    Medication Sig Taking?  Authorizing Provider   ferrous sulfate (IRON 325) 325 (65 Fe) MG tablet Take 1 tablet by mouth 2 times daily (with meals) Yes Aniket Shaikh MD   folic acid (FOLVITE) 1 MG tablet Take 1 tablet by mouth daily Yes Aniket Shaikh MD   vitamin B-12 500 MCG tablet Take 1 tablet by mouth daily Yes Aniket Shaikh MD   metoprolol tartrate (LOPRESSOR) 25 MG tablet Take 0.5 tablets by mouth 2 times daily Yes Aniket Shaikh MD   rivaroxaban (XARELTO) 15 MG TABS tablet Take 1 tablet by mouth Daily with supper Yes Aniket Shaikh MD   amiodarone (CORDARONE) 200 MG tablet Take 200 mg by mouth daily Yes Historical Provider, MD   albuterol sulfate HFA (VENTOLIN HFA) 108 (90 Base) MCG/ACT inhaler Inhale 2 puffs into the lungs 4 times daily as needed for Wheezing Yes Nu Hidalgo DO   furosemide (LASIX) 40 MG tablet TAKE ONE TABLET BY MOUTH EVERY DAY Yes Historical Provider, MD   atorvastatin (LIPITOR) 40 MG tablet Take 40 mg by mouth daily  Yes Historical Provider, MD   Handicap Placard MISC by Does not apply route Duration: Lifetime Yes Nu Hidalgo DO   spironolactone (ALDACTONE) 25 MG tablet Take 25 mg by mouth daily  Yes Historical Provider, MD         Past lb (57.6 kg)   Height: 5' 6\" (1.676 m)     Body mass index is 20.5 kg/m². Based upon direct observation of the patient, evaluation of cognition reveals recent and remote memory intact. General Appearance: alert and oriented to person, place and time, well developed and well- nourished, in no acute distress  Skin: warm and dry, no rash or erythema  Head: normocephalic and atraumatic  Eyes: pupils equal, round, and reactive to light, extraocular eye movements intact, conjunctivae normal  ENT: tympanic membrane, external ear and ear canal normal bilaterally, nose without deformity, nasal mucosa and turbinates normal without polyps  Neck: supple and non-tender without mass, no thyromegaly or thyroid nodules, no cervical lymphadenopathy  Pulmonary/Chest: clear to auscultation bilaterally- no wheezes, rales or rhonchi, normal air movement, no respiratory distress  Cardiovascular: normal rate, regular rhythm, normal S1 and S2, no murmurs, rubs, clicks, or gallops, distal pulses intact, no carotid bruits  Abdomen: soft, non-tender, non-distended, normal bowel sounds, no masses or organomegaly  Extremities: no cyanosis, clubbing or edema  Musculoskeletal: normal range of motion, no joint swelling, deformity or tenderness  Neurologic: reflexes normal and symmetric, no cranial nerve deficit, gait, coordination and speech normal    Patient's complete Health Risk Assessment and screening values have been reviewed and are found in Flowsheets. The following problems were reviewed today and where indicated follow up appointments were made and/or referrals ordered. Positive Risk Factor Screenings with Interventions:            General Health and ACP:  General  In general, how would you say your health is?: Very Good  In the past 7 days, have you experienced any of the following?  New or Increased Pain, New or Increased Fatigue, Loneliness, Social Isolation, Stress or Anger?: None of These  Do you get the social and emotional support that you need?: Yes  Do you have a Living Will?: (!) No  Advance Directives     Power of  Living Will ACP-Advance Directive ACP-Power of     Not on File Filed on 04/27/13 Filed Not on File      General Health Risk Interventions:  · No Living Will: Patient declines ACP discussion/assistance    Health Habits/Nutrition:  Health Habits/Nutrition  Do you exercise for at least 20 minutes 2-3 times per week?: (!) No  Have you lost any weight without trying in the past 3 months?: No  Do you eat only one meal per day?: No  Have you seen the dentist within the past year?: Yes  Body mass index: 20.5  Health Habits/Nutrition Interventions:  · Inadequate physical activity:  patient is not ready to increase his/her physical activity level at this time    Hearing/Vision:  No exam data present  Hearing/Vision  Do you or your family notice any trouble with your hearing that hasn't been managed with hearing aids?: (!) Yes  Do you have difficulty driving, watching TV, or doing any of your daily activities because of your eyesight?: No  Have you had an eye exam within the past year?: Yes  Hearing/Vision Interventions:  · Hearing concerns:  patient declines any further evaluation/treatment for hearing issues      Personalized Preventive Plan   Current Health Maintenance Status  Immunization History   Administered Date(s) Administered    COVID-19, Nunes Peter, PF, 30mcg/0.3mL 02/01/2021, 02/22/2021    Influenza Virus Vaccine 11/03/2011, 10/22/2012, 10/27/2015    Influenza, High Dose (Fluzone 65 yrs and older) 10/25/2017, 10/22/2018    Influenza, Quadv, adjuvanted, 65 yrs +, IM, PF (Fluad) 10/13/2020    Influenza, Triv, inactivated, subunit, adjuvanted, IM (Fluad 65 yrs and older) 10/17/2019    Pneumococcal Conjugate 13-valent ([id number]) 07/18/2017    Pneumococcal Polysaccharide ([id number]) 09/05/2018    Tdap (Boostrix, Adacel) 09/04/2012        Health Maintenance   Topic Date Due    Hepatitis C screen Never done    Shingles Vaccine (1 of 2) Never done    DEXA (modify frequency per FRAX score)  Never done   [employer] Visit (AWV)  Never done    Breast cancer screen  09/19/2019    Low dose CT lung screening  02/24/2021    Flu vaccine (1) 09/20/2022 (Originally 9/1/2021)    Lipid screen  10/13/2021    TSH testing  08/20/2022    DTaP/Tdap/Td vaccine (2 - Td or Tdap) 09/04/2022    Potassium monitoring  09/16/2022    Creatinine monitoring  09/16/2022    Colon cancer screen colonoscopy  08/11/2031    Pneumococcal 65+ years Vaccine  Completed    COVID-19 Vaccine  Completed    Hepatitis A vaccine  Aged Out    Hepatitis B vaccine  Aged Out    Hib vaccine  Aged Out    Meningococcal (ACWY) vaccine  Aged Out     Recommendations for [place] Due: see orders and patient instructions/AVS.  . Recommended screening schedule for the next 5-10 years is provided to the patient in written form: see Patient Instructions/AVS.    Wilber Cintron was seen today for medicare awv.     Diagnoses and all orders for this visit:    Routine general medical examination at a health care facility

## 2021-09-23 ENCOUNTER — HOSPITAL ENCOUNTER (OUTPATIENT)
Dept: GENERAL RADIOLOGY | Age: 71
Discharge: HOME | End: 2021-09-25
Payer: MEDICARE

## 2021-09-23 VITALS — HEIGHT: 66 IN | WEIGHT: 130 LBS | BODY MASS INDEX: 20.89 KG/M2

## 2021-09-23 DIAGNOSIS — Z12.31 ENCOUNTER FOR SCREENING MAMMOGRAM FOR MALIGNANT NEOPLASM OF BREAST: ICD-10-CM

## 2021-09-23 PROCEDURE — 77063 BREAST TOMOSYNTHESIS BI: CPT

## 2021-10-13 ENCOUNTER — TELEPHONE (OUTPATIENT)
Dept: FAMILY MEDICINE CLINIC | Age: 71
End: 2021-10-13

## 2021-10-13 NOTE — TELEPHONE ENCOUNTER
----- Message from Paulette Lee sent at 10/13/2021 11:21 AM EDT -----  Subject: Appointment Request    Reason for Call: Flu Shot    QUESTIONS  Type of Appointment? Established Patient  Reason for appointment request? No appointments available during search  Additional Information for Provider? Pt would like to schedule her flu   shot  ---------------------------------------------------------------------------  --------------  CALL BACK INFO  What is the best way for the office to contact you? OK to leave message on   voicemail  Preferred Call Back Phone Number? [phone]  ---------------------------------------------------------------------------  --------------  SCRIPT ANSWERS  Relationship to Patient? Self   Is the Adult patient requesting a Flu Shot? Yes  Is the parent/patient requesting a Flu Shot for a child older than 6   months? No  Does the patient have a question for their provider regarding the flu   shot? No  Have you been diagnosed with, awaiting test results for, or told that you   are suspected of having COVID-19 (Coronavirus)? (If patient has tested   negative or was tested as a requirement for work, school, or travel and   not based on symptoms, answer no)? No  Within the past two weeks have you developed any of the following symptoms   (answer no if symptoms have been present longer than 2 weeks or began   more than 2 weeks ago)? Fever or Chills, Cough, Shortness of breath or   difficulty breathing, Loss of taste or smell, Sore throat, Nasal   congestion, Sneezing or runny nose, Fatigue or generalized body aches   (answer no if pain is specific to a body part e.g. back pain), Diarrhea,   Headache? No  Have you had close contact with someone with COVID-19 in the last 14 days? No  (Service Expert  click yes below to proceed with [facility] As Usual   Scheduling)?  Yes

## 2021-10-22 ENCOUNTER — NURSE ONLY (OUTPATIENT)
Dept: FAMILY MEDICINE CLINIC | Age: 71
End: 2021-10-22
Payer: MEDICARE

## 2021-10-22 DIAGNOSIS — Z23 NEED FOR INFLUENZA VACCINATION: Primary | ICD-10-CM

## 2021-10-22 PROCEDURE — 90694 VACC AIIV4 NO PRSRV 0.5ML IM: CPT | Performed by: FAMILY MEDICINE

## 2021-10-22 PROCEDURE — G0008 ADMIN INFLUENZA VIRUS VAC: HCPCS | Performed by: FAMILY MEDICINE

## 2022-01-06 ENCOUNTER — OFFICE VISIT (OUTPATIENT)
Dept: FAMILY MEDICINE CLINIC | Age: 72
End: 2022-01-06
Payer: MEDICARE

## 2022-01-06 VITALS
SYSTOLIC BLOOD PRESSURE: 140 MMHG | WEIGHT: 125 LBS | OXYGEN SATURATION: 94 % | DIASTOLIC BLOOD PRESSURE: 70 MMHG | TEMPERATURE: 97.2 F | HEART RATE: 60 BPM | BODY MASS INDEX: 20.09 KG/M2 | HEIGHT: 66 IN | RESPIRATION RATE: 16 BRPM

## 2022-01-06 DIAGNOSIS — R05.9 COUGH: Primary | ICD-10-CM

## 2022-01-06 DIAGNOSIS — R05.9 COUGH: ICD-10-CM

## 2022-01-06 LAB
INFLUENZA A ANTIBODY: NORMAL
INFLUENZA B ANTIBODY: NORMAL

## 2022-01-06 PROCEDURE — 87804 INFLUENZA ASSAY W/OPTIC: CPT | Performed by: FAMILY MEDICINE

## 2022-01-06 PROCEDURE — 99213 OFFICE O/P EST LOW 20 MIN: CPT | Performed by: FAMILY MEDICINE

## 2022-01-06 RX ORDER — METHYLPREDNISOLONE 4 MG/1
TABLET ORAL
Qty: 1 KIT | Refills: 0 | Status: SHIPPED | OUTPATIENT
Start: 2022-01-06 | End: 2022-01-12

## 2022-01-06 RX ORDER — DOXYCYCLINE HYCLATE 100 MG/1
100 CAPSULE ORAL 2 TIMES DAILY
Qty: 20 CAPSULE | Refills: 0 | Status: SHIPPED | OUTPATIENT
Start: 2022-01-06 | End: 2022-01-16

## 2022-01-06 NOTE — PROGRESS NOTES
Congestion:  Patient is here with complaints of congestion, sinus pressure, drainage and cough for 1 week(s). Cough is  productive. Over the counter medications include none. Patient does not have a change in appetite. Patient is drinking well. Patient does not smoke. Sick contacts include . Patient's past medical, surgical, social and/or family history reviewed, updated in chart, and are non-contributory (unless otherwise stated). Medications and allergies also reviewed and updated in chart.       Review of Systems:  Constitutional:  - fever, + fatigue, + chills, + headaches, no weight change, +reduced appetite  Dermatology:  No rash, no mole, no dry or sensitive skin  ENT:  + cough, + sore throat, + sinus pain, + runny nose, no ear pain  Cardiology:  No chest pain, no palpitations, no leg edema, no shortness of breath, no PND  Gastroenterology:  No dysphagia, no abdominal pain, no nausea, no vomiting, no constipation, no diarrhea, no heartburn  Musculoskeletal:  No joint pain, no leg cramps, no back pain, no muscle aches  Respiratory:  No shortness of breath, no orthopnea, no wheezing, no WALKER, no hemoptysis  Urology:  No blood in the urine, no urinary frequency, no urinary incontinence, no urinary urgency, no nocturia, no dysuria  Physical Exam:  Vitals:    01/06/22 1312 01/06/22 1339 01/06/22 1341   BP: (!) 142/70 (!) 140/70    Pulse: 60     Resp: 16     Temp: 97.2 °F (36.2 °C)     TempSrc: Temporal     SpO2: (!) 87% 98% 94%   Weight: 125 lb (56.7 kg)     Height: 5' 6\" (1.676 m)       General:  Patient alert and oriented x 3, NAD, pleasant  HEENT:  Atraumatic, normocephalic, PERRLA, EOMI, clear conjunctiva, TMs clear, nose-congested, + sinus tenderness, throat - + erythema  Neck:  Supple, no goiter, no carotid bruits, no LAD  Lungs:  CTA B  Heart:  RRR, no murmurs, gallops or rubs  Abdomen:  Soft, NTND, + bowel sounds  Extremities:  No clubbing, cyanosis or edema  Skin: no forest    Assessment/Plan:  Luca De Jesus was seen today for cough. Diagnoses and all orders for this visit:    Cough  -     COVID-19 Ambulatory; Future  -     POCT Influenza A/B    Other orders  -     methylPREDNISolone (MEDROL, ESPERANZA,) 4 MG tablet; Take by mouth.  -     doxycycline hyclate (VIBRAMYCIN) 100 MG capsule; Take 1 capsule by mouth 2 times daily for 10 days        Increase fluids, rest, Tylenol every 4-6 hours as needed for fever or body aches. Return to office if symptoms worsen. As above. Call or go to ED immediately if symptoms worsen or persist.  No follow-ups on file. , or sooner if necessary. Educational materials and/or home exercises printed for patient's review and were included in patient instructions on his/her After Visit Summary and given to patient at the end of visit. Counseled regarding above diagnosis, including possible risks and complications,  especially if left uncontrolled. Counseled regarding the possible side effects, risks, benefits and alternatives to treatment; patient and/or guardian verbalizes understanding, agrees, feels comfortable with and wishes to proceed with above treatment plan. Advised patient to call with any new medication issues, and read all Rx info from pharmacy to assure aware of all possible risks and side effects of medication before taking. Patient and/or guardian verbalizes understanding and agrees with above counseling, assessment and plan. All questions answered. Yadira Reed MD  1/17/2022    I have personally reviewed and updated the chief complaint, HPI, Past Medical, Family and Social History, as well as the above Review of Systems.

## 2022-01-08 LAB
SARS-COV-2: NOT DETECTED
SOURCE: NORMAL

## 2022-03-07 ENCOUNTER — OFFICE VISIT (OUTPATIENT)
Dept: ORTHOPEDIC SURGERY | Age: 72
End: 2022-03-07

## 2022-03-07 VITALS — BODY MASS INDEX: 20.89 KG/M2 | HEIGHT: 66 IN | WEIGHT: 130 LBS

## 2022-03-07 DIAGNOSIS — M25.561 ACUTE PAIN OF RIGHT KNEE: Primary | ICD-10-CM

## 2022-03-07 PROCEDURE — 99214 OFFICE O/P EST MOD 30 MIN: CPT | Performed by: ORTHOPAEDIC SURGERY

## 2022-03-07 RX ORDER — APIXABAN 2.5 MG/1
TABLET, FILM COATED ORAL
COMMUNITY
Start: 2022-02-03

## 2022-03-07 RX ORDER — CALCIUM CARBONATE/VITAMIN D3 500 MG-10
TABLET ORAL
COMMUNITY
Start: 2020-11-30 | End: 2022-10-25

## 2022-03-14 ENCOUNTER — TELEPHONE (OUTPATIENT)
Dept: FAMILY MEDICINE CLINIC | Age: 72
End: 2022-03-14

## 2022-03-14 NOTE — TELEPHONE ENCOUNTER
I am unable to call in a controlled substance without seeing the patient per [place] law

## 2022-03-14 NOTE — TELEPHONE ENCOUNTER
She had fallen on ice cracked knee cap, she saw dr Sidney Chan. He cannot prescribe anything for the pain told to sherif her. She is on eliquis. , can you send a script to [facility]

## 2022-03-15 ENCOUNTER — OFFICE VISIT (OUTPATIENT)
Dept: FAMILY MEDICINE CLINIC | Age: 72
End: 2022-03-15
Payer: MEDICARE

## 2022-03-15 VITALS
DIASTOLIC BLOOD PRESSURE: 82 MMHG | WEIGHT: 126 LBS | HEART RATE: 77 BPM | RESPIRATION RATE: 16 BRPM | TEMPERATURE: 98.2 F | BODY MASS INDEX: 20.25 KG/M2 | SYSTOLIC BLOOD PRESSURE: 118 MMHG | HEIGHT: 66 IN | OXYGEN SATURATION: 93 %

## 2022-03-15 DIAGNOSIS — S82.001D CLOSED NONDISPLACED FRACTURE OF RIGHT PATELLA WITH ROUTINE HEALING, UNSPECIFIED FRACTURE MORPHOLOGY, SUBSEQUENT ENCOUNTER: Primary | ICD-10-CM

## 2022-03-15 PROCEDURE — 99213 OFFICE O/P EST LOW 20 MIN: CPT | Performed by: FAMILY MEDICINE

## 2022-03-15 RX ORDER — HYDROCODONE BITARTRATE AND ACETAMINOPHEN 5; 325 MG/1; MG/1
1 TABLET ORAL EVERY 4 HOURS PRN
Qty: 18 TABLET | Refills: 0 | Status: SHIPPED | OUTPATIENT
Start: 2022-03-15 | End: 2022-03-18

## 2022-03-15 SDOH — ECONOMIC STABILITY: FOOD INSECURITY: WITHIN THE PAST 12 MONTHS, YOU WORRIED THAT YOUR FOOD WOULD RUN OUT BEFORE YOU GOT MONEY TO BUY MORE.: NEVER TRUE

## 2022-03-15 SDOH — ECONOMIC STABILITY: FOOD INSECURITY: WITHIN THE PAST 12 MONTHS, THE FOOD YOU BOUGHT JUST DIDN'T LAST AND YOU DIDN'T HAVE MONEY TO GET MORE.: NEVER TRUE

## 2022-03-15 ASSESSMENT — SOCIAL DETERMINANTS OF HEALTH (SDOH): HOW HARD IS IT FOR YOU TO PAY FOR THE VERY BASICS LIKE FOOD, HOUSING, MEDICAL CARE, AND HEATING?: NOT HARD AT ALL

## 2022-03-15 NOTE — PROGRESS NOTES
3/15/2022    Chief Complaint   Patient presents with    Knee Pain     right knee pain fell on it 2 weeks ago seen dr Erlin Arias she really needs something for pain which he did not give        HPI    Lilly Segovia is a 74 y.o. patient that presents today for knee pain:     Knee Pain: Patient presents for follow up on a knee problem involving the  right knee. Onset of the symptoms was several weeks ago. Inciting event: injured while falling. Current symptoms include stiffness and swelling. Pain is aggravated by any weight bearing. Patient has had no prior knee problems. Evaluation to date: has seen ortho. . Treatment to date: avoidance of offending activity. Patient's past medical, surgical, social and/or family history reviewed, updated in chart, and are non-contributory (unless otherwise stated). Medications and allergies also reviewed and updated in chart. ROS negative unless otherwise specified    Physical Exam  /82   Pulse 77   Temp 98.2 °F (36.8 °C)   Resp 16   Ht 5' 6\" (1.676 m)   Wt 126 lb (57.2 kg)   SpO2 93%   BMI 20.34 kg/m²   Wt Readings from Last 3 Encounters:   03/15/22 126 lb (57.2 kg)   03/07/22 130 lb (59 kg)   01/06/22 125 lb (56.7 kg)     Temp Readings from Last 3 Encounters:   03/15/22 98.2 °F (36.8 °C)   01/06/22 97.2 °F (36.2 °C) (Temporal)   09/20/21 97.5 °F (36.4 °C)     BP Readings from Last 3 Encounters:   03/15/22 118/82   01/06/22 (!) 140/70   09/20/21 118/78     Pulse Readings from Last 3 Encounters:   03/15/22 77   01/06/22 60   09/20/21 73       General appearance: alert, well appearing, and in no distress, oriented to person, place, and time and normal appearing weight. CVS exam: normal rate, regular rhythm, normal S1, S2, no murmurs, rubs, clicks or gallops. Radial pulses 2+ bilateral.  PT/DP pulse 2+ bilat. No C/C/E    Chest: clear to auscultation, no wheezes, rales or rhonchi, symmetric air entry.      Musculoskeletal: MS 5/5, DTR 2/4 x4 extremities, FROM F/E spine, no tenderness, obvious masses or deformities. Gait normal.     Abdomen: Soft, non-tender, non-distended, positive BS in all 4 quadrants    Extremities:Dorsalis pedis pulses palpated bilaterally, no clubbing, cyanosis, edema or erythema     SKIN: no lesions, jaundice, petechiae, pallor, cyanosis, ecchymosis    NEURO: gross motor exam normal by observation, gait normal      Assessment/Plan  Damon Mathur was seen today for knee pain. Diagnoses and all orders for this visit:    Closed nondisplaced fracture of right patella with routine healing, unspecified fracture morphology, subsequent encounter  -     HYDROcodone-acetaminophen (NORCO) 5-325 MG per tablet; Take 1 tablet by mouth every 4 hours as needed for Pain for up to 3 days. Intended supply: 3 days. Take lowest dose possible to manage pain          No follow-ups on file. Nu Resendiz, DO    Call or go to ED immediately if symptoms worsen or persist.    Educational materials and/or home exercises printed for patient's review and were included in patient instructions on his/her After Visit Summary and given to patient at the end of visit. Counseled regarding above diagnosis, including possible risks and complications,  especially if left uncontrolled. Counseled regarding the possible side effects, risks, benefits and alternatives to treatment; patient and/or guardian verbalizes understanding, agrees, feels comfortable with and wishes to proceed with above treatment plan. Advised patient to call with any new medication issues, and read all Rx info from pharmacy to assure aware of all possible risks and side effects of medication before taking. Reviewed age and gender appropriate health screening exams and vaccinations.   Advised patient regarding importance of keeping up with recommended health maintenance and to schedule as soon as possible if overdue, as this is important in assessing for undiagnosed pathology, especially cancer, as well as protecting against potentially harmful/life threatening disease. Patient and/or guardian verbalizes understanding and agrees with above counseling, assessment and plan. All questions answered.

## 2022-04-04 DIAGNOSIS — E78.2 MIXED HYPERLIPIDEMIA: Primary | ICD-10-CM

## 2022-04-05 ENCOUNTER — HOSPITAL ENCOUNTER (OUTPATIENT)
Age: 72
Discharge: HOME | End: 2022-04-05
Payer: MEDICARE

## 2022-04-05 DIAGNOSIS — E78.2 MIXED HYPERLIPIDEMIA: ICD-10-CM

## 2022-04-05 LAB
ALBUMIN SERPL-MCNC: 4.3 G/DL (ref 3.5–5.2)
ALP BLD-CCNC: 73 U/L (ref 35–104)
ALT SERPL-CCNC: 29 U/L (ref 0–32)
ANION GAP SERPL CALCULATED.3IONS-SCNC: 12 MMOL/L (ref 7–16)
AST SERPL-CCNC: 39 U/L (ref 0–31)
BASOPHILS ABSOLUTE: 0.05 E9/L (ref 0–0.2)
BASOPHILS RELATIVE PERCENT: 0.6 % (ref 0–2)
BILIRUB SERPL-MCNC: 1.2 MG/DL (ref 0–1.2)
BUN BLDV-MCNC: 31 MG/DL (ref 6–23)
CALCIUM SERPL-MCNC: 9.5 MG/DL (ref 8.6–10.2)
CHLORIDE BLD-SCNC: 103 MMOL/L (ref 98–107)
CHOLESTEROL, TOTAL: 116 MG/DL (ref 0–199)
CO2: 25 MMOL/L (ref 22–29)
CREAT SERPL-MCNC: 1.7 MG/DL (ref 0.5–1)
EOSINOPHILS ABSOLUTE: 0.17 E9/L (ref 0.05–0.5)
EOSINOPHILS RELATIVE PERCENT: 2 % (ref 0–6)
GFR AFRICAN AMERICAN: 36
GFR NON-AFRICAN AMERICAN: 29 ML/MIN/1.73
GLUCOSE BLD-MCNC: 92 MG/DL (ref 74–99)
HCT VFR BLD CALC: 37.4 % (ref 34–48)
HDLC SERPL-MCNC: 43 MG/DL
HEMOGLOBIN: 11.8 G/DL (ref 11.5–15.5)
IMMATURE GRANULOCYTES #: 0.04 E9/L
IMMATURE GRANULOCYTES %: 0.5 % (ref 0–5)
LDL CHOLESTEROL CALCULATED: 55 MG/DL (ref 0–99)
LYMPHOCYTES ABSOLUTE: 0.99 E9/L (ref 1.5–4)
LYMPHOCYTES RELATIVE PERCENT: 11.8 % (ref 20–42)
MCH RBC QN AUTO: 31.5 PG (ref 26–35)
MCHC RBC AUTO-ENTMCNC: 31.6 % (ref 32–34.5)
MCV RBC AUTO: 99.7 FL (ref 80–99.9)
MONOCYTES ABSOLUTE: 0.75 E9/L (ref 0.1–0.95)
MONOCYTES RELATIVE PERCENT: 8.9 % (ref 2–12)
NEUTROPHILS ABSOLUTE: 6.39 E9/L (ref 1.8–7.3)
NEUTROPHILS RELATIVE PERCENT: 76.2 % (ref 43–80)
PDW BLD-RTO: 14.9 FL (ref 11.5–15)
PLATELET # BLD: 280 E9/L (ref 130–450)
PMV BLD AUTO: 8.7 FL (ref 7–12)
POTASSIUM SERPL-SCNC: 4.8 MMOL/L (ref 3.5–5)
RBC # BLD: 3.75 E12/L (ref 3.5–5.5)
SODIUM BLD-SCNC: 140 MMOL/L (ref 132–146)
TOTAL PROTEIN: 7.2 G/DL (ref 6.4–8.3)
TRIGL SERPL-MCNC: 92 MG/DL (ref 0–149)
VLDLC SERPL CALC-MCNC: 18 MG/DL
WBC # BLD: 8.4 E9/L (ref 4.5–11.5)

## 2022-04-05 PROCEDURE — 36415 COLL VENOUS BLD VENIPUNCTURE: CPT

## 2022-04-05 PROCEDURE — 80061 LIPID PANEL: CPT

## 2022-04-05 PROCEDURE — 80053 COMPREHEN METABOLIC PANEL: CPT

## 2022-04-05 PROCEDURE — 85025 COMPLETE CBC W/AUTO DIFF WBC: CPT

## 2022-04-13 ENCOUNTER — OFFICE VISIT (OUTPATIENT)
Dept: ORTHOPEDIC SURGERY | Age: 72
End: 2022-04-13
Payer: MEDICARE

## 2022-04-13 ENCOUNTER — OFFICE VISIT (OUTPATIENT)
Dept: FAMILY MEDICINE CLINIC | Age: 72
End: 2022-04-13
Payer: MEDICARE

## 2022-04-13 VITALS — WEIGHT: 126 LBS | BODY MASS INDEX: 20.25 KG/M2 | HEIGHT: 66 IN

## 2022-04-13 VITALS
SYSTOLIC BLOOD PRESSURE: 92 MMHG | RESPIRATION RATE: 18 BRPM | HEART RATE: 71 BPM | TEMPERATURE: 97.5 F | DIASTOLIC BLOOD PRESSURE: 60 MMHG | OXYGEN SATURATION: 95 % | WEIGHT: 129 LBS | BODY MASS INDEX: 20.73 KG/M2 | HEIGHT: 66 IN

## 2022-04-13 DIAGNOSIS — R73.9 ELEVATED BLOOD SUGAR: Primary | ICD-10-CM

## 2022-04-13 DIAGNOSIS — I10 ESSENTIAL HYPERTENSION: ICD-10-CM

## 2022-04-13 DIAGNOSIS — S82.091A OTHER CLOSED FRACTURE OF RIGHT PATELLA, INITIAL ENCOUNTER: ICD-10-CM

## 2022-04-13 DIAGNOSIS — M25.561 ACUTE PAIN OF RIGHT KNEE: Primary | ICD-10-CM

## 2022-04-13 PROCEDURE — 99213 OFFICE O/P EST LOW 20 MIN: CPT | Performed by: FAMILY MEDICINE

## 2022-04-13 PROCEDURE — 99213 OFFICE O/P EST LOW 20 MIN: CPT | Performed by: ORTHOPAEDIC SURGERY

## 2022-04-13 RX ORDER — DABIGATRAN ETEXILATE 75 MG/1
1 CAPSULE, COATED PELLETS ORAL
COMMUNITY
End: 2022-10-18

## 2022-04-13 RX ORDER — DABIGATRAN ETEXILATE 150 MG/1
CAPSULE, COATED PELLETS ORAL
COMMUNITY
Start: 2020-07-20 | End: 2022-10-25

## 2022-04-13 NOTE — PROGRESS NOTES
4/30/2022    Chief Complaint   Patient presents with    Hyperlipidemia     routine check up  review labs      Nodule     was seen at [address] [place] [address] yesterday        Eliazar Blanco is a 74 y.o. patient that presents today for:    Hyperlipidemia:  Patient presents with hyperlipidemia. Is asymptomatic. This is a chronic problem. Patient is  well controlled, as reviewed and seen on most recent labs. Compliance with treatment thus far has been adequate. No adverse effects. Feeling well otherwise, no complaints. Patient's past medical, surgical, social and/or family history reviewed, updated in chart, and are non-contributory (unless otherwise stated). Medications and allergies also reviewed and updated in chart. ROS negative unless otherwise specified    Physical Exam  Wt Readings from Last 3 Encounters:   04/13/22 126 lb (57.2 kg)   04/13/22 129 lb (58.5 kg)   03/15/22 126 lb (57.2 kg)     Temp Readings from Last 3 Encounters:   04/13/22 97.5 °F (36.4 °C)   03/15/22 98.2 °F (36.8 °C)   01/06/22 97.2 °F (36.2 °C) (Temporal)     BP Readings from Last 3 Encounters:   04/13/22 92/60   03/15/22 118/82   01/06/22 (!) 140/70     Pulse Readings from Last 3 Encounters:   04/13/22 71   03/15/22 77   01/06/22 60       General appearance: alert, well appearing, and in no distress, oriented to person, place, and time and normal appearing weight. CVS exam: normal rate, regular rhythm, normal S1, S2, no murmurs, rubs, clicks or gallops. Radial pulses 2+ bilateral.  PT/DP pulse 2+ bilat. No C/C/E    Chest: clear to auscultation, no wheezes, rales or rhonchi, symmetric air entry.      Abdomen: Soft, non-tender, non-distended, positive BS in all 4 quadrants    Extremities:Dorsalis pedis pulses palpated bilaterally, no clubbing, cyanosis, edema or erythema     SKIN: warm, dry, no lesions, jaundice, petechiae, pallor, cyanosis, ecchymosis    NEURO: gross motor exam normal by observation, gait normal    Mental status - alert, oriented to person, place, and time, normal mood, behavior, speech, dress, motor activity, and thought processes      Assessment/Plan  Tatiana Puente was seen today for hyperlipidemia and nodule. Diagnoses and all orders for this visit:    Elevated blood sugar  -     Cancel: POCT glycosylated hemoglobin (Hb A1C)    Essential hypertension        Return in about 6 months (around 10/13/2022) for AWV. Call or go to ED immediately if symptoms worsen or persist.    Educational materials and/or home exercises printed for patient's review and were included in patient instructions on his/her After Visit Summary and given to patient at the end of visit. Counseled regarding above diagnosis, including possible risks and complications,  especially if left uncontrolled. Counseled regarding the possible side effects, risks, benefits and alternatives to treatment; patient and/or guardian verbalizes understanding, agrees, feels comfortable with and wishes to proceed with above treatment plan. Advised patient to call with any new medication issues, and read all Rx info from pharmacy to assure aware of all possible risks and side effects of medication before taking. Reviewed age and gender appropriate health screening exams and vaccinations. Advised patient regarding importance of keeping up with recommended health maintenance and to schedule as soon as possible if overdue, as this is important in assessing for undiagnosed pathology, especially cancer, as well as protecting against potentially harmful/life threatening disease. Patient and/or guardian verbalizes understanding and agrees with above counseling, assessment and plan. All questions answered.       Nu Resendiz, DO

## 2022-04-13 NOTE — PROGRESS NOTES
Follow Up Visit      Charles Mckeon returns today for follow up visit regarding Right Knee pain/edema post fall on 3/7/22 resulting in a stable fracture. Treatment thus far has included rest with good improvement. She reports symptoms are significantly improved.         REVIEW OF SYSTEMS:      Constitutional:  Negative for weight loss, fevers, chills, fatigue  Cardiovascular: Negative for chest pain, palpitations  Pulmonary: Negative for shortness of breath, labored breathing, cough  GI: negative for abdominal pain, nausea, vomitting   MSK: per HPI  Skin: negative for rash, open wounds     All other systems reviewed and are negative         Physical Exam:      Height: 5' 6\" (1.676 m), Weight: 129 lb (58.5 kg), BP: 92/60     General: Alert and oriented x3, no acute distress  Cardiovascular/pulmonary: No labored breathing, peripheral perfusion intact  Musculoskeletal:  Right knee  Mild changes skin is intact circumferentially. She does have some edema and ecchymosis over the medial knee. It is improving compared to prior exams. She is able to straight leg raise. She can extend the knee against resistance. No tenderness to palpation over the patella. Sensation grossly intact.     Controlled Substances Monitoring:       Imaging:  X-ray of the right knee obtained and reviewed today in office. Demonstrating a well-healed patella fracture. No signs of displacement. No other fractures or dislocations are noted. Impression: Healing right patella fracture        Assessment: Right healing patella fracture        Plan:   Weightbearing as tolerated right lower extremity. Discussed with her that her swelling and pain will continue to improve as she gets farther out from the injury. No longer needs immobilization.     Electronically signed by Sharron Hazel DO on 4/13/2022 at 10:49 AM    Staff Addendum    I have seen and evaluated the patient and agree with the assessment and plan as documented by the orthopaedic surgery resident. I have performed the key components of the history and physical examination and concur with the findings and plan, and have made changes where appropriate/necessary. Agree with above. We will see her back in 3 months for final imaging and to reassess.       Monica Elizalde MD  [facility]

## 2022-04-13 NOTE — PROGRESS NOTES
Follow Up Visit     Johanne Conteh returns today for follow up visit regarding Right Knee pain/edema post fall on 3/7/22 resulting in a stable fracture. Treatment thus far has included rest with good improvement. She reports symptoms are significantly improved.        REVIEW OF SYSTEMS:     Constitutional:  Negative for weight loss, fevers, chills, fatigue  Cardiovascular: Negative for chest pain, palpitations  Pulmonary: Negative for shortness of breath, labored breathing, cough  GI: negative for abdominal pain, nausea, vomitting   MSK: per HPI  Skin: negative for rash, open wounds    All other systems reviewed and are negative       Physical Exam:     Height: 5' 6\" (1.676 m), Weight: 129 lb (58.5 kg), BP: 92/60    General: Alert and oriented x3, no acute distress  Cardiovascular/pulmonary: No labored breathing, peripheral perfusion intact  Musculoskeletal:    ***    Controlled Substances Monitoring:      Imaging:  ***      Assessment: ***      Plan:   ***    Frederic Kinney MD  Orthopaedic Surgery   4/13/22  10:24 AM

## 2022-10-17 ENCOUNTER — TELEPHONE (OUTPATIENT)
Dept: FAMILY MEDICINE CLINIC | Age: 72
End: 2022-10-17

## 2022-10-17 NOTE — TELEPHONE ENCOUNTER
Srinivasan Eaton has called stating she has congestion and flem and due to her COPD she is winded asking if we can call in something She had this the same time last year Please Advise

## 2022-10-18 ENCOUNTER — OFFICE VISIT (OUTPATIENT)
Dept: FAMILY MEDICINE CLINIC | Age: 72
End: 2022-10-18
Payer: MEDICARE

## 2022-10-18 VITALS
DIASTOLIC BLOOD PRESSURE: 62 MMHG | HEIGHT: 66 IN | TEMPERATURE: 96.7 F | HEART RATE: 56 BPM | OXYGEN SATURATION: 90 % | BODY MASS INDEX: 20.09 KG/M2 | WEIGHT: 125 LBS | SYSTOLIC BLOOD PRESSURE: 110 MMHG

## 2022-10-18 DIAGNOSIS — J44.9 CHRONIC OBSTRUCTIVE PULMONARY DISEASE, UNSPECIFIED COPD TYPE (HCC): ICD-10-CM

## 2022-10-18 DIAGNOSIS — U07.1 COVID-19: Primary | ICD-10-CM

## 2022-10-18 PROBLEM — S83.249A ACUTE MEDIAL MENISCAL TEAR: Status: ACTIVE | Noted: 2020-07-22

## 2022-10-18 PROBLEM — R59.0 THORACIC LYMPHADENOPATHY: Status: ACTIVE | Noted: 2020-02-24

## 2022-10-18 PROBLEM — R91.1: Status: ACTIVE | Noted: 2020-02-24

## 2022-10-18 PROBLEM — I27.22 PULMONARY HYPERTENSION DUE TO LEFT HEART DISEASE (HCC): Status: ACTIVE | Noted: 2020-03-13

## 2022-10-18 PROBLEM — R49.0 HOARSENESS: Status: ACTIVE | Noted: 2022-10-18

## 2022-10-18 PROBLEM — M25.569 KNEE PAIN: Status: ACTIVE | Noted: 2020-09-02

## 2022-10-18 PROBLEM — R63.4 WEIGHT LOSS, UNINTENTIONAL: Status: ACTIVE | Noted: 2020-02-24

## 2022-10-18 PROBLEM — B94.8: Status: ACTIVE | Noted: 2020-02-24

## 2022-10-18 PROBLEM — R93.89 ABNORMAL FINDINGS ON DIAGNOSTIC IMAGING OF OTHER SPECIFIED BODY STRUCTURES: Status: ACTIVE | Noted: 2022-10-18

## 2022-10-18 PROBLEM — C33: Status: RESOLVED | Noted: 2020-09-22 | Resolved: 2022-10-18

## 2022-10-18 PROBLEM — R93.89 ABNORMAL CT SCAN, CHEST: Status: ACTIVE | Noted: 2022-10-18

## 2022-10-18 PROBLEM — B38.2: Status: ACTIVE | Noted: 2022-10-18

## 2022-10-18 PROBLEM — H25.819 COMBINED FORM OF SENILE CATARACT: Status: ACTIVE | Noted: 2017-05-02

## 2022-10-18 PROBLEM — I50.32 CHRONIC DIASTOLIC CONGESTIVE HEART FAILURE (HCC): Status: ACTIVE | Noted: 2022-10-18

## 2022-10-18 PROBLEM — C34.80: Status: RESOLVED | Noted: 2020-09-22 | Resolved: 2022-10-18

## 2022-10-18 PROCEDURE — 99213 OFFICE O/P EST LOW 20 MIN: CPT | Performed by: FAMILY MEDICINE

## 2022-10-18 PROCEDURE — 1123F ACP DISCUSS/DSCN MKR DOCD: CPT | Performed by: FAMILY MEDICINE

## 2022-10-18 PROCEDURE — 3074F SYST BP LT 130 MM HG: CPT | Performed by: FAMILY MEDICINE

## 2022-10-18 PROCEDURE — 3078F DIAST BP <80 MM HG: CPT | Performed by: FAMILY MEDICINE

## 2022-10-18 RX ORDER — TRIAMCINOLONE ACETONIDE 1 MG/G
CREAM TOPICAL
COMMUNITY
Start: 2022-09-09 | End: 2022-11-30

## 2022-10-18 RX ORDER — DEXAMETHASONE 6 MG/1
6 TABLET ORAL
Qty: 6 TABLET | Refills: 0 | Status: SHIPPED | OUTPATIENT
Start: 2022-10-18 | End: 2022-10-24

## 2022-10-18 ASSESSMENT — PATIENT HEALTH QUESTIONNAIRE - PHQ9
2. FEELING DOWN, DEPRESSED OR HOPELESS: 0
SUM OF ALL RESPONSES TO PHQ QUESTIONS 1-9: 0
SUM OF ALL RESPONSES TO PHQ9 QUESTIONS 1 & 2: 0
SUM OF ALL RESPONSES TO PHQ QUESTIONS 1-9: 0
1. LITTLE INTEREST OR PLEASURE IN DOING THINGS: 0

## 2022-10-18 NOTE — PROGRESS NOTES
10/18/2022    Chief Complaint   Patient presents with    Cough    Shortness of Breath       HPI    Africa Díaz is a 74 y.o. patient that presents today with:    Upper Respiratory Infection:  Patient complains of URI/sinusitis symptoms. States she has a history of COPD. States she is using a respiratory nebulizer. States her O2 levels range from 83-90%/ States with the breathing machine she gets it up to 92%. States she has a productive cough with light yellow to clear phlegm. States she has been spitting this out. States she has been taking mucinex to help. States she has been feeling good for a week and a half. States she took negative COVID tests and had a positive test on Monday, 10/17/22. No fever, chills, or body aches. Patient's past medical, surgical, social and/or family history reviewed, updated in chart, and are non-contributory (unless otherwise stated). Medications and allergies also reviewed and updated in chart.      ROS Unless otherwise specified  Review of Systems - General ROS: negative for - chills, fatigue, fever, night sweats, sleep disturbance, weight gain or weight loss  Psychological ROS: negative for - anxiety, behavioral disorder, depression, hallucinations, irritability, memory difficulties, mood swings, sleep disturbances or suicidal ideation  ENT ROS: negative for - epistaxis, headaches, hearing change, nasal congestion, nasal discharge, nasal polyps, sinus pain, tinnitus, vertigo or visual changes  Hematological and Lymphatic ROS: negative for - bleeding problems, blood clots, fatigue or swollen lymph nodes  Respiratory ROS: negative for - cough, orthopnea, shortness of breath, sputum changes, tachypnea or wheezing  Cardiovascular ROS: negative for - chest pain, dyspnea on exertion, irregular heartbeat, loss of consciousness, palpitations, paroxysmal nocturnal dyspnea or rapid heart rate  Gastrointestinal ROS: negative for - abdominal pain, blood in stools, change in bowel habits, constipation, diarrhea, gas/bloating, heartburn or nausea/vomiting  Musculoskeletal ROS: negative for - joint pain, joint stiffness, joint swelling or muscle, back pain, bowel or bladder incontinence  Neurological ROS: negative for - behavioral changes, confusion, dizziness, headaches, memory loss, numbness/tingling, seizures or speech problems, weakness  Dermatological ROS: negative for - dry skin, mole changes, nail changes, pruritus, rash or skin lesion changes    Physical Exam  /62   Pulse 56   Temp (!) 96.7 °F (35.9 °C)   Ht 5' 6\" (1.676 m)   Wt 125 lb (56.7 kg)   SpO2 90%   BMI 20.18 kg/m²     Wt Readings from Last 3 Encounters:   10/18/22 125 lb (56.7 kg)   04/13/22 126 lb (57.2 kg)   04/13/22 129 lb (58.5 kg)     BP Readings from Last 3 Encounters:   10/18/22 110/62   04/13/22 92/60   03/15/22 118/82         General appearance: alert, well appearing, and in no distress, oriented to person, place, and time and normal appearing weight. HEENT:  HEAD: Atraumatic, normocephalic  EYES: PERRL  EOM intact  EARS: bilateral TM's and external ear canals normal  NOSE: nasal mucosa, septum, turbinates normal bilaterally  MOUTH: mucous membranes moist and normal tonsils  NECK: supple, full range of motion, no mass, normal lymphadenopathy, no thyromegaly    CVS exam: normal rate, regular rhythm, normal S1, S2, no murmurs, rubs, clicks or gallops. Radial pulses 2+ bilateral.  PT/DP pulse 2+ bilat. No C/C/E    Chest: clear to auscultation, no wheezes, rales or rhonchi, symmetric air entry. SKIN: no lesions, jaundice, petechiae, pallor, cyanosis, ecchymosis        Assessment/Plan  Osman Sepulveda was seen today for cough and shortness of breath. Diagnoses and all orders for this visit:    COVID-19  -     nirmatrelvir/ritonavir (PAXLOVID) 20 x 150 MG & 10 x 100MG TBPK; Take 3 tablets (two 150 mg nirmatrelvir and one 100 mg ritonavir tablets) by mouth every 12 hours for 5 days.     Chronic obstructive pulmonary disease, unspecified COPD type (HCC)  -     dexamethasone (DECADRON) 6 MG tablet; Take 1 tablet by mouth daily (with breakfast) for 6 days      Advised on symptomatic relief. Tylenol or Advil for fever/pain/body aches. OTC meds prn cough/ congestion. Rest. Stay hydrated. Advised to call PCP in 3-5 days for recheck if symptoms persist. ED sooner if symptoms worsen or change. ED immediately with high or refractory fever, SOB, CP, shaking chills, vomiting, abdominal pain, etc. Pt is in agreement with this care plan. All questions answered. No follow-ups on file. Nu Resendiz, DO    Call or go to ED immediately if symptoms worsen or persist.    Counseled regarding above diagnosis, including possible risks and complications,  especially if left uncontrolled. Counseled regarding the possible side effects, risks, benefits and alternatives to treatment; patient and/or guardian verbalizes understanding, agrees, feels comfortable with and wishes to proceed with above treatment plan. Advised patient to call with any new medication issues, and read all Rx info from pharmacy to assure aware of all possible risks and side effects of medication before taking. Patient and/or guardian verbalizes understanding and agrees with above counseling, assessment and plan. All questions answered.

## 2022-10-25 ENCOUNTER — OFFICE VISIT (OUTPATIENT)
Dept: FAMILY MEDICINE CLINIC | Age: 72
End: 2022-10-25
Payer: MEDICARE

## 2022-10-25 VITALS
RESPIRATION RATE: 18 BRPM | OXYGEN SATURATION: 99 % | DIASTOLIC BLOOD PRESSURE: 75 MMHG | TEMPERATURE: 97.4 F | HEART RATE: 44 BPM | HEIGHT: 66 IN | WEIGHT: 123.6 LBS | SYSTOLIC BLOOD PRESSURE: 130 MMHG | BODY MASS INDEX: 19.86 KG/M2

## 2022-10-25 DIAGNOSIS — Z00.00 MEDICARE ANNUAL WELLNESS VISIT, SUBSEQUENT: Primary | ICD-10-CM

## 2022-10-25 DIAGNOSIS — J44.1 COPD EXACERBATION (HCC): ICD-10-CM

## 2022-10-25 DIAGNOSIS — I48.91 ATRIAL FIBRILLATION, UNSPECIFIED TYPE (HCC): ICD-10-CM

## 2022-10-25 DIAGNOSIS — I42.9 CARDIOMYOPATHY, UNSPECIFIED TYPE (HCC): ICD-10-CM

## 2022-10-25 DIAGNOSIS — I73.9 PVD (PERIPHERAL VASCULAR DISEASE) WITH CLAUDICATION (HCC): ICD-10-CM

## 2022-10-25 PROCEDURE — 3074F SYST BP LT 130 MM HG: CPT | Performed by: FAMILY MEDICINE

## 2022-10-25 PROCEDURE — 3078F DIAST BP <80 MM HG: CPT | Performed by: FAMILY MEDICINE

## 2022-10-25 PROCEDURE — 1123F ACP DISCUSS/DSCN MKR DOCD: CPT | Performed by: FAMILY MEDICINE

## 2022-10-25 PROCEDURE — G0439 PPPS, SUBSEQ VISIT: HCPCS | Performed by: FAMILY MEDICINE

## 2022-10-25 RX ORDER — PREDNISONE 10 MG/1
TABLET ORAL
Qty: 30 TABLET | Refills: 0 | Status: ON HOLD
Start: 2022-10-25 | End: 2022-11-20 | Stop reason: HOSPADM

## 2022-10-25 RX ORDER — DOXYCYCLINE HYCLATE 100 MG
100 TABLET ORAL 2 TIMES DAILY
Qty: 20 TABLET | Refills: 0 | Status: SHIPPED | OUTPATIENT
Start: 2022-10-25 | End: 2022-11-04

## 2022-10-25 ASSESSMENT — PATIENT HEALTH QUESTIONNAIRE - PHQ9
2. FEELING DOWN, DEPRESSED OR HOPELESS: 0
SUM OF ALL RESPONSES TO PHQ QUESTIONS 1-9: 0
SUM OF ALL RESPONSES TO PHQ9 QUESTIONS 1 & 2: 0
SUM OF ALL RESPONSES TO PHQ QUESTIONS 1-9: 0
1. LITTLE INTEREST OR PLEASURE IN DOING THINGS: 0
SUM OF ALL RESPONSES TO PHQ QUESTIONS 1-9: 0
SUM OF ALL RESPONSES TO PHQ QUESTIONS 1-9: 0

## 2022-10-25 ASSESSMENT — LIFESTYLE VARIABLES
HOW MANY STANDARD DRINKS CONTAINING ALCOHOL DO YOU HAVE ON A TYPICAL DAY: PATIENT DOES NOT DRINK
HOW OFTEN DO YOU HAVE A DRINK CONTAINING ALCOHOL: NEVER

## 2022-10-25 NOTE — PATIENT INSTRUCTIONS
Personalized Preventive Plan for Tyler Butler - 10/25/2022  Medicare offers a range of preventive health benefits. Some of the tests and screenings are paid in full while other may be subject to a deductible, co-insurance, and/or copay. Some of these benefits include a comprehensive review of your medical history including lifestyle, illnesses that may run in your family, and various assessments and screenings as appropriate. After reviewing your medical record and screening and assessments performed today your provider may have ordered immunizations, labs, imaging, and/or referrals for you. A list of these orders (if applicable) as well as your Preventive Care list are included within your After Visit Summary for your review. Other Preventive Recommendations:    A preventive eye exam performed by an eye specialist is recommended every 1-2 years to screen for glaucoma; cataracts, macular degeneration, and other eye disorders. A preventive dental visit is recommended every 6 months. Try to get at least 150 minutes of exercise per week or 10,000 steps per day on a pedometer . Order or download the FREE \"Exercise & Physical Activity: Your Everyday Guide\" from [employer]. Call 8-524.265.4898 or search [employer] online. You need 0965-9421 mg of calcium and 3533-4239 IU of vitamin D per day. It is possible to meet your calcium requirement with diet alone, but a vitamin D supplement is usually necessary to meet this goal.  When exposed to the sun, use a sunscreen that protects against both UVA and UVB radiation with an SPF of 30 or greater. Reapply every 2 to 3 hours or after sweating, drying off with a towel, or swimming. Always wear a seat belt when traveling in a car. Always wear a helmet when riding a bicycle or motorcycle.

## 2022-10-25 NOTE — PROGRESS NOTES
Medicare Annual Wellness Visit    Nemo Lynn is here for Medicare AWV (Pt states she was unable to take the paxlovid and states she still isn't feel well. States seh has COPD and her pulmonologist recommended that she has a portable O2 tank when she is out running errands. HM reviewed, pt is not up to date. States she sees a pulmonologist, so doesn't feel a need to get a yearly lung CT. No recent labs, pt is not fasting at this time. )    Assessment & Plan   Medicare annual wellness visit, subsequent  Cardiomyopathy, unspecified type ([address]  PVD (peripheral vascular disease) with claudication ([address]  Atrial fibrillation, unspecified type ([address]  COPD exacerbation ([address]  -     XR CHEST (2 VW); Future  -     predniSONE (DELTASONE) 10 MG tablet; Take 3 tabs po qd for 5 days, then 2 tabs po qd for 5 days then 1 tab po qd for 5 days then stop., Disp-30 tablet, R-0Normal    Recommendations for Preventive Services Due: see orders and patient instructions/AVS.  Recommended screening schedule for the next 5-10 years is provided to the patient in written form: see Patient Instructions/AVS.     Return for Medicare Annual Wellness Visit in 1 year. Subjective   The following acute and/or chronic problems were also addressed today:  COVID + 1 week ago. Patient's complete Health Risk Assessment and screening values have been reviewed and are found in Flowsheets. The following problems were reviewed today and where indicated follow up appointments were made and/or referrals ordered.     Positive Risk Factor Screenings with Interventions:              Health Habits/Nutrition:  Physical Activity: Insufficiently Active    Days of Exercise per Week: 1 day    Minutes of Exercise per Session: 20 min     Have you lost any weight without trying in the past 3 months?: No  Body mass index: 19.95  Have you seen the dentist within the past year?: (!) No  Health Habits/Nutrition Interventions:  Dental exam overdue:  patient encouraged to make appointment with his/her dentist             Objective   Vitals:    10/25/22 0955 10/25/22 1000   BP: (!) 117/52 130/75   Pulse: (!) 44 (!) 44   Resp: 18    Temp: 97.4 °F (36.3 °C)    SpO2: 91% 99%   Weight: 123 lb 9.6 oz (56.1 kg)    Height: 5' 6\" (1.676 m)       Body mass index is 19.95 kg/m². General Appearance: alert and oriented to person, place and time, well developed and well- nourished, in no acute distress  Skin: warm and dry, no rash or erythema  Head: normocephalic and atraumatic  Eyes: pupils equal, round, and reactive to light, extraocular eye movements intact, conjunctivae normal  ENT: tympanic membrane, external ear and ear canal normal bilaterally, nose without deformity, nasal mucosa and turbinates normal without polyps  Neck: supple and non-tender without mass, no thyromegaly or thyroid nodules, no cervical lymphadenopathy  Pulmonary/Chest: clear to auscultation bilaterally- no wheezes, rales or rhonchi, normal air movement, no respiratory distress  Cardiovascular: normal rate, regular rhythm, normal S1 and S2, no murmurs, rubs, clicks, or gallops, distal pulses intact, no carotid bruits  Abdomen: soft, non-tender, non-distended, normal bowel sounds, no masses or organomegaly  Extremities: no cyanosis, clubbing or edema  Musculoskeletal: normal range of motion, no joint swelling, deformity or tenderness  Neurologic: reflexes normal and symmetric, no cranial nerve deficit, gait, coordination and speech normal       No Known Allergies  Prior to Visit Medications    Medication Sig Taking?  Authorizing Provider   doxycycline hyclate (VIBRA-TABS) 100 MG tablet Take 1 tablet by mouth 2 times daily for 10 days Yes Nu Hidalgo, DO   albuterol (PROVENTIL;VENTOLIN) 2 MG/5ML syrup Take 5 mLs by mouth 3 times daily Yes Nu Hidalgo, DO   Respiratory Therapy Supplies (NEBULIZER MASK ADULT) MISC 1 Units by Does not apply route 4 times daily as needed (dyspnea) Yes Diego Ron Hidalgo DO   Nebulizers (PORTABLE COMPRESSOR NEBULIZER) MISC 1 Units by Does not apply route 4 times daily Yes Nu Hidalgo DO   predniSONE (DELTASONE) 10 MG tablet Take 3 tabs po qd for 5 days, then 2 tabs po qd for 5 days then 1 tab po qd for 5 days then stop. Yes Nu Hidalgo DO   triamcinolone (KENALOG) 0.1 % cream APPLY A SMALL AMOUNT TO THE AFFECTED AREA TWO TIMES A DAY.  Yes Historical Provider, MD   ELIQUIS 2.5 MG TABS tablet  Yes Historical Provider, MD   vitamin B-12 500 MCG tablet Take 1 tablet by mouth daily Yes Natali Hay MD   metoprolol tartrate (LOPRESSOR) 25 MG tablet Take 0.5 tablets by mouth 2 times daily Yes Natali Hay MD   amiodarone (CORDARONE) 200 MG tablet Take 200 mg by mouth daily Yes Historical Provider, MD   albuterol sulfate HFA (VENTOLIN HFA) 108 (90 Base) MCG/ACT inhaler Inhale 2 puffs into the lungs 4 times daily as needed for Wheezing Yes Nu Hidalgo DO   furosemide (LASIX) 40 MG tablet TAKE ONE TABLET BY MOUTH EVERY DAY Yes Historical Provider, MD   atorvastatin (LIPITOR) 40 MG tablet Take 40 mg by mouth daily  Yes Historical Provider, MD   Handicap Placard [address] [facility] by Does not apply route Duration: Lifetime Yes Indira Hidalgo DO       CareTeam (Including outside providers/suppliers regularly involved in providing care):   Patient Care Team:  Yudy Guardado DO as PCP - General (Family Medicine)  Indira Hidalgo DO as PCP - [facility] Empaneled Provider     Reviewed and updated this visit:  Allergies  Meds

## 2022-11-09 ENCOUNTER — IMMUNIZATION (OUTPATIENT)
Dept: FAMILY MEDICINE CLINIC | Age: 72
End: 2022-11-09
Payer: MEDICARE

## 2022-11-09 PROCEDURE — 90694 VACC AIIV4 NO PRSRV 0.5ML IM: CPT | Performed by: FAMILY MEDICINE

## 2022-11-09 PROCEDURE — G0008 ADMIN INFLUENZA VIRUS VAC: HCPCS | Performed by: FAMILY MEDICINE

## 2022-11-19 ENCOUNTER — HOSPITAL ENCOUNTER (INPATIENT)
Age: 72
LOS: 1 days | Discharge: HOME | DRG: 291 | End: 2022-11-20
Attending: EMERGENCY MEDICINE | Admitting: STUDENT IN AN ORGANIZED HEALTH CARE EDUCATION/TRAINING PROGRAM
Payer: MEDICARE

## 2022-11-19 ENCOUNTER — APPOINTMENT (OUTPATIENT)
Dept: GENERAL RADIOLOGY | Age: 72
DRG: 291 | End: 2022-11-19
Payer: MEDICARE

## 2022-11-19 DIAGNOSIS — N18.9 ACUTE KIDNEY INJURY SUPERIMPOSED ON CKD (HCC): ICD-10-CM

## 2022-11-19 DIAGNOSIS — J96.01 ACUTE RESPIRATORY FAILURE WITH HYPOXIA (HCC): Primary | ICD-10-CM

## 2022-11-19 DIAGNOSIS — I50.32 CHRONIC DIASTOLIC CONGESTIVE HEART FAILURE (HCC): ICD-10-CM

## 2022-11-19 DIAGNOSIS — N17.9 ACUTE KIDNEY INJURY SUPERIMPOSED ON CKD (HCC): ICD-10-CM

## 2022-11-19 PROBLEM — I50.33 ACUTE ON CHRONIC DIASTOLIC CHF (CONGESTIVE HEART FAILURE) (HCC): Status: ACTIVE | Noted: 2022-11-19

## 2022-11-19 PROBLEM — U07.1 COVID-19: Status: ACTIVE | Noted: 2022-11-19

## 2022-11-19 PROBLEM — J96.91 RESPIRATORY FAILURE WITH HYPOXIA (HCC): Status: ACTIVE | Noted: 2022-11-19

## 2022-11-19 PROBLEM — R94.31 PROLONGED Q-T INTERVAL ON ECG: Status: ACTIVE | Noted: 2022-11-19

## 2022-11-19 LAB
ALBUMIN SERPL-MCNC: 3.1 G/DL (ref 3.5–5.2)
ALP BLD-CCNC: 94 U/L (ref 35–104)
ALT SERPL-CCNC: 30 U/L (ref 0–32)
ANION GAP SERPL CALCULATED.3IONS-SCNC: 14 MMOL/L (ref 7–16)
AST SERPL-CCNC: 39 U/L (ref 0–31)
BASOPHILS ABSOLUTE: 0.01 E9/L (ref 0–0.2)
BASOPHILS RELATIVE PERCENT: 0.1 % (ref 0–2)
BILIRUB SERPL-MCNC: 1.2 MG/DL (ref 0–1.2)
BUN BLDV-MCNC: 23 MG/DL (ref 6–23)
BURR CELLS: ABNORMAL
CALCIUM SERPL-MCNC: 8.7 MG/DL (ref 8.6–10.2)
CHLORIDE BLD-SCNC: 102 MMOL/L (ref 98–107)
CO2: 21 MMOL/L (ref 22–29)
CREAT SERPL-MCNC: 1.1 MG/DL (ref 0.5–1)
EKG ATRIAL RATE: 208 BPM
EKG Q-T INTERVAL: 388 MS
EKG QRS DURATION: 104 MS
EKG QTC CALCULATION (BAZETT): 495 MS
EKG R AXIS: -44 DEGREES
EKG T AXIS: 169 DEGREES
EKG VENTRICULAR RATE: 98 BPM
EOSINOPHILS ABSOLUTE: 0.04 E9/L (ref 0.05–0.5)
EOSINOPHILS RELATIVE PERCENT: 0.5 % (ref 0–6)
GFR SERPL CREATININE-BSD FRML MDRD: 52 ML/MIN/1.73
GLUCOSE BLD-MCNC: 100 MG/DL (ref 74–99)
HCT VFR BLD CALC: 36.5 % (ref 34–48)
HEMOGLOBIN: 11.4 G/DL (ref 11.5–15.5)
IMMATURE GRANULOCYTES #: 0.06 E9/L
IMMATURE GRANULOCYTES %: 0.8 % (ref 0–5)
INFLUENZA A BY PCR: NOT DETECTED
INFLUENZA B BY PCR: NOT DETECTED
LYMPHOCYTES ABSOLUTE: 0.54 E9/L (ref 1.5–4)
LYMPHOCYTES RELATIVE PERCENT: 7.4 % (ref 20–42)
MCH RBC QN AUTO: 29.9 PG (ref 26–35)
MCHC RBC AUTO-ENTMCNC: 31.2 % (ref 32–34.5)
MCV RBC AUTO: 95.8 FL (ref 80–99.9)
MONOCYTES ABSOLUTE: 0.63 E9/L (ref 0.1–0.95)
MONOCYTES RELATIVE PERCENT: 8.6 % (ref 2–12)
NEUTROPHILS ABSOLUTE: 6.06 E9/L (ref 1.8–7.3)
NEUTROPHILS RELATIVE PERCENT: 82.6 % (ref 43–80)
OVALOCYTES: ABNORMAL
PDW BLD-RTO: 16.9 FL (ref 11.5–15)
PLATELET # BLD: 252 E9/L (ref 130–450)
PMV BLD AUTO: 9.6 FL (ref 7–12)
POIKILOCYTES: ABNORMAL
POTASSIUM SERPL-SCNC: 3.4 MMOL/L (ref 3.5–5)
PRO-BNP: ABNORMAL PG/ML (ref 0–450)
RBC # BLD: 3.81 E12/L (ref 3.5–5.5)
SARS-COV-2, NAAT: DETECTED
SODIUM BLD-SCNC: 137 MMOL/L (ref 132–146)
TOTAL PROTEIN: 6 G/DL (ref 6.4–8.3)
TROPONIN, HIGH SENSITIVITY: 29 NG/L (ref 0–9)
TROPONIN, HIGH SENSITIVITY: 32 NG/L (ref 0–9)
WBC # BLD: 7.3 E9/L (ref 4.5–11.5)

## 2022-11-19 PROCEDURE — 84484 ASSAY OF TROPONIN QUANT: CPT

## 2022-11-19 PROCEDURE — 94664 DEMO&/EVAL PT USE INHALER: CPT

## 2022-11-19 PROCEDURE — 99285 EMERGENCY DEPT VISIT HI MDM: CPT

## 2022-11-19 PROCEDURE — 71045 X-RAY EXAM CHEST 1 VIEW: CPT

## 2022-11-19 PROCEDURE — 99222 1ST HOSP IP/OBS MODERATE 55: CPT | Performed by: INTERNAL MEDICINE

## 2022-11-19 PROCEDURE — 6370000000 HC RX 637 (ALT 250 FOR IP): Performed by: EMERGENCY MEDICINE

## 2022-11-19 PROCEDURE — 2580000003 HC RX 258: Performed by: FAMILY MEDICINE

## 2022-11-19 PROCEDURE — 6370000000 HC RX 637 (ALT 250 FOR IP): Performed by: FAMILY MEDICINE

## 2022-11-19 PROCEDURE — 2060000000 HC ICU INTERMEDIATE R&B

## 2022-11-19 PROCEDURE — 6360000002 HC RX W HCPCS: Performed by: FAMILY MEDICINE

## 2022-11-19 PROCEDURE — 80053 COMPREHEN METABOLIC PANEL: CPT

## 2022-11-19 PROCEDURE — 6360000002 HC RX W HCPCS: Performed by: EMERGENCY MEDICINE

## 2022-11-19 PROCEDURE — 93005 ELECTROCARDIOGRAM TRACING: CPT | Performed by: EMERGENCY MEDICINE

## 2022-11-19 PROCEDURE — 96374 THER/PROPH/DIAG INJ IV PUSH: CPT

## 2022-11-19 PROCEDURE — 87502 INFLUENZA DNA AMP PROBE: CPT

## 2022-11-19 PROCEDURE — 85025 COMPLETE CBC W/AUTO DIFF WBC: CPT

## 2022-11-19 PROCEDURE — APPSS60 APP SPLIT SHARED TIME 46-60 MINUTES: Performed by: PHYSICIAN ASSISTANT

## 2022-11-19 PROCEDURE — 2700000000 HC OXYGEN THERAPY PER DAY

## 2022-11-19 PROCEDURE — 83880 ASSAY OF NATRIURETIC PEPTIDE: CPT

## 2022-11-19 PROCEDURE — 99222 1ST HOSP IP/OBS MODERATE 55: CPT | Performed by: STUDENT IN AN ORGANIZED HEALTH CARE EDUCATION/TRAINING PROGRAM

## 2022-11-19 PROCEDURE — 87635 SARS-COV-2 COVID-19 AMP PRB: CPT

## 2022-11-19 PROCEDURE — 93010 ELECTROCARDIOGRAM REPORT: CPT | Performed by: INTERNAL MEDICINE

## 2022-11-19 PROCEDURE — 94640 AIRWAY INHALATION TREATMENT: CPT

## 2022-11-19 RX ORDER — SODIUM CHLORIDE 9 MG/ML
INJECTION, SOLUTION INTRAVENOUS PRN
Status: DISCONTINUED | OUTPATIENT
Start: 2022-11-19 | End: 2022-11-20 | Stop reason: HOSPADM

## 2022-11-19 RX ORDER — FUROSEMIDE 10 MG/ML
40 INJECTION INTRAMUSCULAR; INTRAVENOUS ONCE
Status: COMPLETED | OUTPATIENT
Start: 2022-11-20 | End: 2022-11-20

## 2022-11-19 RX ORDER — PREDNISONE 20 MG/1
40 TABLET ORAL DAILY
Status: DISCONTINUED | OUTPATIENT
Start: 2022-11-19 | End: 2022-11-20 | Stop reason: HOSPADM

## 2022-11-19 RX ORDER — DEXAMETHASONE 4 MG/1
6 TABLET ORAL DAILY
Status: CANCELLED | OUTPATIENT
Start: 2022-11-19 | End: 2022-11-29

## 2022-11-19 RX ORDER — SPIRONOLACTONE 25 MG/1
12.5 TABLET ORAL DAILY
Status: DISCONTINUED | OUTPATIENT
Start: 2022-11-19 | End: 2022-11-20 | Stop reason: HOSPADM

## 2022-11-19 RX ORDER — ACETAMINOPHEN 650 MG/1
650 SUPPOSITORY RECTAL EVERY 6 HOURS PRN
Status: DISCONTINUED | OUTPATIENT
Start: 2022-11-19 | End: 2022-11-20 | Stop reason: HOSPADM

## 2022-11-19 RX ORDER — ACETAMINOPHEN 325 MG/1
650 TABLET ORAL EVERY 6 HOURS PRN
Status: DISCONTINUED | OUTPATIENT
Start: 2022-11-19 | End: 2022-11-20 | Stop reason: HOSPADM

## 2022-11-19 RX ORDER — METHYLPREDNISOLONE SODIUM SUCCINATE 125 MG/2ML
125 INJECTION, POWDER, LYOPHILIZED, FOR SOLUTION INTRAMUSCULAR; INTRAVENOUS ONCE
Status: COMPLETED | OUTPATIENT
Start: 2022-11-19 | End: 2022-11-19

## 2022-11-19 RX ORDER — POTASSIUM CHLORIDE 20 MEQ/1
40 TABLET, EXTENDED RELEASE ORAL
Status: DISCONTINUED | OUTPATIENT
Start: 2022-11-20 | End: 2022-11-20 | Stop reason: HOSPADM

## 2022-11-19 RX ORDER — SODIUM CHLORIDE 0.9 % (FLUSH) 0.9 %
5-40 SYRINGE (ML) INJECTION EVERY 12 HOURS SCHEDULED
Status: DISCONTINUED | OUTPATIENT
Start: 2022-11-19 | End: 2022-11-20 | Stop reason: HOSPADM

## 2022-11-19 RX ORDER — PROCHLORPERAZINE MALEATE 5 MG/1
5 TABLET ORAL EVERY 6 HOURS PRN
Status: DISCONTINUED | OUTPATIENT
Start: 2022-11-19 | End: 2022-11-20 | Stop reason: HOSPADM

## 2022-11-19 RX ORDER — FUROSEMIDE 10 MG/ML
40 INJECTION INTRAMUSCULAR; INTRAVENOUS ONCE
Status: COMPLETED | OUTPATIENT
Start: 2022-11-19 | End: 2022-11-19

## 2022-11-19 RX ORDER — PANTOPRAZOLE SODIUM 40 MG/1
40 TABLET, DELAYED RELEASE ORAL
Status: DISCONTINUED | OUTPATIENT
Start: 2022-11-20 | End: 2022-11-20 | Stop reason: HOSPADM

## 2022-11-19 RX ORDER — IPRATROPIUM BROMIDE AND ALBUTEROL SULFATE 2.5; .5 MG/3ML; MG/3ML
1 SOLUTION RESPIRATORY (INHALATION) EVERY 4 HOURS
Status: DISCONTINUED | OUTPATIENT
Start: 2022-11-19 | End: 2022-11-20 | Stop reason: HOSPADM

## 2022-11-19 RX ORDER — AZITHROMYCIN 250 MG/1
500 TABLET, FILM COATED ORAL DAILY
Status: DISCONTINUED | OUTPATIENT
Start: 2022-11-19 | End: 2022-11-19

## 2022-11-19 RX ORDER — SODIUM CHLORIDE 0.9 % (FLUSH) 0.9 %
5-40 SYRINGE (ML) INJECTION PRN
Status: DISCONTINUED | OUTPATIENT
Start: 2022-11-19 | End: 2022-11-20 | Stop reason: HOSPADM

## 2022-11-19 RX ORDER — AMIODARONE HYDROCHLORIDE 200 MG/1
200 TABLET ORAL DAILY
Status: DISCONTINUED | OUTPATIENT
Start: 2022-11-19 | End: 2022-11-20 | Stop reason: HOSPADM

## 2022-11-19 RX ORDER — POLYETHYLENE GLYCOL 3350 17 G/17G
17 POWDER, FOR SOLUTION ORAL DAILY PRN
Status: DISCONTINUED | OUTPATIENT
Start: 2022-11-19 | End: 2022-11-20 | Stop reason: HOSPADM

## 2022-11-19 RX ORDER — AZITHROMYCIN 250 MG/1
250 TABLET, FILM COATED ORAL DAILY
Status: DISCONTINUED | OUTPATIENT
Start: 2022-11-20 | End: 2022-11-19

## 2022-11-19 RX ORDER — ATORVASTATIN CALCIUM 40 MG/1
40 TABLET, FILM COATED ORAL DAILY
Status: DISCONTINUED | OUTPATIENT
Start: 2022-11-19 | End: 2022-11-20 | Stop reason: HOSPADM

## 2022-11-19 RX ORDER — IPRATROPIUM BROMIDE AND ALBUTEROL SULFATE 2.5; .5 MG/3ML; MG/3ML
3 SOLUTION RESPIRATORY (INHALATION) ONCE
Status: COMPLETED | OUTPATIENT
Start: 2022-11-19 | End: 2022-11-19

## 2022-11-19 RX ORDER — FUROSEMIDE 40 MG/1
40 TABLET ORAL DAILY
Status: DISCONTINUED | OUTPATIENT
Start: 2022-11-19 | End: 2022-11-19

## 2022-11-19 RX ORDER — POTASSIUM CHLORIDE 20 MEQ/1
40 TABLET, EXTENDED RELEASE ORAL ONCE
Status: COMPLETED | OUTPATIENT
Start: 2022-11-19 | End: 2022-11-19

## 2022-11-19 RX ADMIN — IPRATROPIUM BROMIDE AND ALBUTEROL SULFATE 1 AMPULE: 2.5; .5 SOLUTION RESPIRATORY (INHALATION) at 20:53

## 2022-11-19 RX ADMIN — AMIODARONE HYDROCHLORIDE 200 MG: 200 TABLET ORAL at 11:35

## 2022-11-19 RX ADMIN — ATORVASTATIN CALCIUM 40 MG: 40 TABLET, FILM COATED ORAL at 11:37

## 2022-11-19 RX ADMIN — SODIUM CHLORIDE, PRESERVATIVE FREE 10 ML: 5 INJECTION INTRAVENOUS at 20:05

## 2022-11-19 RX ADMIN — SODIUM CHLORIDE, PRESERVATIVE FREE 10 ML: 5 INJECTION INTRAVENOUS at 11:39

## 2022-11-19 RX ADMIN — PREDNISONE 40 MG: 20 TABLET ORAL at 11:35

## 2022-11-19 RX ADMIN — METOPROLOL TARTRATE 12.5 MG: 25 TABLET, FILM COATED ORAL at 11:35

## 2022-11-19 RX ADMIN — IPRATROPIUM BROMIDE AND ALBUTEROL SULFATE 1 AMPULE: 2.5; .5 SOLUTION RESPIRATORY (INHALATION) at 09:42

## 2022-11-19 RX ADMIN — IPRATROPIUM BROMIDE AND ALBUTEROL SULFATE 1 AMPULE: 2.5; .5 SOLUTION RESPIRATORY (INHALATION) at 13:00

## 2022-11-19 RX ADMIN — METOPROLOL TARTRATE 12.5 MG: 25 TABLET, FILM COATED ORAL at 20:04

## 2022-11-19 RX ADMIN — POTASSIUM CHLORIDE 40 MEQ: 1500 TABLET, EXTENDED RELEASE ORAL at 11:37

## 2022-11-19 RX ADMIN — SPIRONOLACTONE 12.5 MG: 25 TABLET ORAL at 11:36

## 2022-11-19 RX ADMIN — FUROSEMIDE 40 MG: 10 INJECTION, SOLUTION INTRAMUSCULAR; INTRAVENOUS at 11:38

## 2022-11-19 RX ADMIN — IPRATROPIUM BROMIDE AND ALBUTEROL SULFATE 1 AMPULE: 2.5; .5 SOLUTION RESPIRATORY (INHALATION) at 16:18

## 2022-11-19 RX ADMIN — IPRATROPIUM BROMIDE AND ALBUTEROL SULFATE 3 ML: .5; 3 SOLUTION RESPIRATORY (INHALATION) at 05:06

## 2022-11-19 RX ADMIN — APIXABAN 2.5 MG: 2.5 TABLET, FILM COATED ORAL at 11:37

## 2022-11-19 RX ADMIN — METHYLPREDNISOLONE SODIUM SUCCINATE 125 MG: 125 INJECTION, POWDER, FOR SOLUTION INTRAMUSCULAR; INTRAVENOUS at 04:48

## 2022-11-19 RX ADMIN — APIXABAN 2.5 MG: 2.5 TABLET, FILM COATED ORAL at 20:04

## 2022-11-19 ASSESSMENT — ENCOUNTER SYMPTOMS
BACK PAIN: 0
COUGH: 1
ABDOMINAL PAIN: 0
COLOR CHANGE: 0
RHINORRHEA: 0
EYE PAIN: 0
VOMITING: 0
SHORTNESS OF BREATH: 1
NAUSEA: 0
SORE THROAT: 0
PHOTOPHOBIA: 0
WHEEZING: 0
TROUBLE SWALLOWING: 0
VOICE CHANGE: 0
EYES NEGATIVE: 1
DIARRHEA: 0

## 2022-11-19 ASSESSMENT — PAIN - FUNCTIONAL ASSESSMENT
PAIN_FUNCTIONAL_ASSESSMENT: NONE - DENIES PAIN

## 2022-11-19 ASSESSMENT — LIFESTYLE VARIABLES
HOW OFTEN DO YOU HAVE A DRINK CONTAINING ALCOHOL: NEVER
HOW MANY STANDARD DRINKS CONTAINING ALCOHOL DO YOU HAVE ON A TYPICAL DAY: PATIENT DOES NOT DRINK

## 2022-11-19 NOTE — ED PROVIDER NOTES
74y.o. year old female presenting to the emergency room with concerns of dyspnea. Reports that symptom's onset 3 days prior, worsening today. Worsen with exertion. Improves with oxygen. Severity of moderate, with no radiation. Symptoms are worsening in timing. Symptoms described as shortness of breath with cough, SPO2 less than 90% on room air. Hanna Muir associated symptoms of none. Patient history of cardiomyopathy. On Eliquis. Patient with SPO2 less than 90% on triage improving with oxygen at first exam.  Chief Complaint   Patient presents with    Shortness of Breath     A few weeks post covid, feeling sob, SpO2 has been in 70's to 80s all day        Review of Systems   Constitutional:  Negative for chills, fatigue and fever. HENT:  Negative for rhinorrhea, sore throat, trouble swallowing and voice change. Eyes:  Negative for photophobia and visual disturbance. Respiratory:  Positive for cough and shortness of breath. Negative for wheezing. Cardiovascular:  Negative for chest pain. Gastrointestinal:  Negative for abdominal pain, diarrhea, nausea and vomiting. Genitourinary:  Negative for dysuria, frequency, hematuria and urgency. Musculoskeletal:  Negative for arthralgias, back pain, neck pain and neck stiffness. Skin:  Negative for wound. Neurological:  Negative for dizziness, syncope, weakness, numbness and headaches. Psychiatric/Behavioral:  Negative for behavioral problems and confusion. The patient is nervous/anxious. Physical Exam  Vitals reviewed. Constitutional:       General: She is not in acute distress. Appearance: Normal appearance. She is not diaphoretic. HENT:      Head: Normocephalic. Right Ear: External ear normal.      Left Ear: External ear normal.      Nose: Nose normal.      Mouth/Throat:      Pharynx: Oropharynx is clear. Eyes:      General:         Right eye: No discharge. Left eye: No discharge.       Conjunctiva/sclera: Conjunctivae normal. Cardiovascular:      Rate and Rhythm: Regular rhythm. Tachycardia present. Heart sounds: No murmur heard. No friction rub. No gallop. Pulmonary:      Effort: Pulmonary effort is normal. No respiratory distress. Breath sounds: No stridor. Decreased breath sounds and rhonchi present. Abdominal:      General: There is no distension. Palpations: Abdomen is soft. Tenderness: There is no abdominal tenderness. There is no guarding or rebound. Musculoskeletal:         General: No tenderness or deformity. Cervical back: Normal range of motion and neck supple. No rigidity or tenderness. Skin:     General: Skin is warm. Coloration: Skin is not jaundiced. Nails: There is no clubbing. Neurological:      Mental Status: She is alert and oriented to person, place, and time. Sensory: No sensory deficit. Motor: No weakness. Psychiatric:         Mood and Affect: Mood is anxious. Behavior: Behavior normal.        Procedures     XR CHEST PORTABLE   Final Result   Mild CHF. EKG:  This EKG is signed by emergency department physician. Rate: 98  Rhythm: Atrial fibrillation  Interpretation: atrial fibrillation (chronic)  Comparison: changes compared to previous EKG     MDM  Number of Diagnoses or Management Options  Diagnosis management comments: 70-year-old female present emerged part complaints of shortness of breath, cough. Patient post COVID. Began feeling short of breath for COVID 4 days. SPO2 today was less than 90% with reading of 60% per patient. Patient denies any chest pain, nausea, vomiting. [address] X-ray with CHF. Patient not normally on oxygen at home requiring oxygen at this time. No elevation of white count negative for influenza. COVID remains positive. BNP 17,000. Spoke to family medicine resident, discussed patient will plan to admit patient at this time.          ED Course as of 11/19/22 0827   Sat Nov 19, 2022   9025 Spoke to [facility] medicine, Dr. Caden Penn, discussed patient and will plan to admit patient at this time. Resident at [facility], to see patient in the ER [DOC]      ED Course User Index  [DOC] Bernie ApoorvaDO alexandria        ED Course as of 11/19/22 0827   Sat Nov 19, 2022   7269 Spoke to [facility] medicine, Dr. Caden Penn, discussed patient and will plan to admit patient at this time. Resident at hospital, to see patient in the ER [DOC]      ED Course User Index  [DOC] Bernie Guerin DO       --------------------------------------------- PAST HISTORY ---------------------------------------------  Past Medical History:  has a past medical history of Atrial fibrillation ([facility] Cancer ([facility] Cardiomyopathy ([facility] CVA (cerebral vascular accident) ([facility] Hyperlipidemia, Hypertension, PVD (peripheral vascular disease) with claudication ([facility] Skin cancer, basal cell, Spider veins, and Varicose veins of both lower extremities. Past Surgical History:  has a past surgical history that includes Colonoscopy; Tubal ligation; Cardiac surgery (2017); bronchoscopy (N/A, 1/31/2020); bronchoscopy (N/A, 1/31/2020); bronchoscopy (1/31/2020); bronchoscopy (1/31/2020); bronchoscopy (1/31/2020); Upper gastrointestinal endoscopy (N/A, 8/10/2021); Colonoscopy (N/A, 8/11/2021); and Breast surgery (Left). Social History:  reports that she quit smoking about 11 years ago. Her smoking use included cigarettes. She has a 20.00 pack-year smoking history. She quit smokeless tobacco use about 11 years ago. She reports that she does not drink alcohol and does not use drugs. Family History: family history includes Breast Cancer in her maternal aunt and mother; Diabetes in her father. The patients home medications have been reviewed. Allergies: Patient has no known allergies.     -------------------------------------------------- RESULTS -------------------------------------------------    LABS:  Results for orders placed or performed during the hospital encounter of 11/19/22   COVID-19, Rapid    Specimen: Nasopharyngeal Swab   Result Value Ref Range    SARS-CoV-2, NAAT DETECTED (A) Not Detected   RAPID INFLUENZA A/B ANTIGENS    Specimen: Nasopharyngeal   Result Value Ref Range    Influenza A by PCR Not Detected Not Detected    Influenza B by PCR Not Detected Not Detected   CMP   Result Value Ref Range    Sodium 137 132 - 146 mmol/L    Potassium 3.4 (L) 3.5 - 5.0 mmol/L    Chloride 102 98 - 107 mmol/L    CO2 21 (L) 22 - 29 mmol/L    Anion Gap 14 7 - 16 mmol/L    Glucose 100 (H) 74 - 99 mg/dL    BUN 23 6 - 23 mg/dL    Creatinine 1.1 (H) 0.5 - 1.0 mg/dL    Est, Glom Filt Rate 52 >=60 mL/min/1.73    Calcium 8.7 8.6 - 10.2 mg/dL    Total Protein 6.0 (L) 6.4 - 8.3 g/dL    Albumin 3.1 (L) 3.5 - 5.2 g/dL    Total Bilirubin 1.2 0.0 - 1.2 mg/dL    Alkaline Phosphatase 94 35 - 104 U/L    ALT 30 0 - 32 U/L    AST 39 (H) 0 - 31 U/L   CBC with Auto Differential   Result Value Ref Range    WBC 7.3 4.5 - 11.5 E9/L    RBC 3.81 3.50 - 5.50 E12/L    Hemoglobin 11.4 (L) 11.5 - 15.5 g/dL    Hematocrit 36.5 34.0 - 48.0 %    MCV 95.8 80.0 - 99.9 fL    MCH 29.9 26.0 - 35.0 pg    MCHC 31.2 (L) 32.0 - 34.5 %    RDW 16.9 (H) 11.5 - 15.0 fL    Platelets 314 462 - 998 E9/L    MPV 9.6 7.0 - 12.0 fL    Neutrophils % 82.6 (H) 43.0 - 80.0 %    Immature Granulocytes % 0.8 0.0 - 5.0 %    Lymphocytes % 7.4 (L) 20.0 - 42.0 %    Monocytes % 8.6 2.0 - 12.0 %    Eosinophils % 0.5 0.0 - 6.0 %    Basophils % 0.1 0.0 - 2.0 %    Neutrophils Absolute 6.06 1.80 - 7.30 E9/L    Immature Granulocytes # 0.06 E9/L    Lymphocytes Absolute 0.54 (L) 1.50 - 4.00 E9/L    Monocytes Absolute 0.63 0.10 - 0.95 E9/L    Eosinophils Absolute 0.04 (L) 0.05 - 0.50 E9/L    Basophils Absolute 0.01 0.00 - 0.20 E9/L    Poikilocytes 1+     Cornelio Cells 1+     Ovalocytes 1+    Troponin   Result Value Ref Range    Troponin, High Sensitivity 32 (H) 0 - 9 ng/L   Brain Natriuretic Peptide   Result Value Ref Range    Pro-BNP 17,580 (H) 0 -

## 2022-11-19 NOTE — ED NOTES
Dr. Skinny Molina at bedside and assessed patient. Dr. Shaka Mckenna at bedside. Patient stated she had Covid about 3 weeks ago and has known cardiomyopathy. Patient is on Eliquis. Patient is not on Oxygen but took pulse oximeter at home and O2 sat was less than 80% on room air. Came to ED for low O2 sat and SOB.   Patient has cough     Violetta Gu RN  11/19/22 3325

## 2022-11-19 NOTE — CONSULTS
Ynes Shrestha M.D. Ace Bee M.D. Evone Charity, M.D. Javier Zayas D.O. Patient:  Denise Taylor 74 y.o. female MRN: [id number]     Date of Service: 11/19/2022      PULMONARY CONSULTATION    Reason for Consultation: Hypoxia  Referring Physician: Selina Loco MD    Chief Complaint   Patient presents with    Shortness of Breath     A few weeks post covid, feeling sob, SpO2 has been in 70's to 80s all day          Code Status: Full Code        SUBJECTIVE:    HPI:  This is a pleasant 77-year-old female with past medical history of adenocarcinoma of right upper lung, multiple pulmonary nodules, coccidioidosis, HOCM, a.fib on eliquis, previous tobacco use presents with worsening SOB with SPO2 in the 70s to 80s. Patient reports that her saturation has been in the 70 to 80s. Saturation less than 90 here and requiring supplemental oxygen with 3 L nasal cannula. Patient normally follows at [facility] with Dr. Melisa Moyer. Saw Dr. Lorenzo Saucedo here locally several years ago. Tells me she got COVID 1 month ago. Felt very ill with this and reports being on the couch for several weeks. She was given a course of antibiotic and steroid. She was not given any other medications as concerned she did not want to come off her Eliquis. She follows at [facility] and reports having an amatory walk test there in which she had desaturation with exertion. She never obtained supplemental O2 for home use. She has been having a persistent cough for past few weeks. Cough is minimally productive with just some clear phlegm. She reports that her cough is worse with laying flat. She noticed that her oxygen has worsened over the past few days at home been in the 70s to 80s. Today she reports feeling significantly better. Is requiring 2 L nasal cannula. She also follows with cardiologist Dr. Yovani Louis for HOCM. She had a chest x-ray showing mild pulmonary vascular congestion.   Reports being compliant with taking her Eliquis. She has a history of adenocarcinoma of the right upper lung. Patient had CT-guided biopsy of subpleural nodule 4/2022 showing adenocarcinoma. EBUS done 4/25/2022 for mediastinal and hilar lymphadenopathy that was negative for malignancy. Given cardiac history and functional status not candidate for resection. Reports having SBRT to right upper lobe lesion last over a month ago. Receives her radiation treatment in [facility].     Past Medical History:   Diagnosis Date    Atrial fibrillation ([facility] [address]     Dr. Shivani Morales [facility]) 2020    lung    Cardiomyopathy [facility])     Dr. Diana Nunn, [facility]    CVA (cerebral vascular accident) [facility])     2014    Hyperlipidemia     Hypertension     PVD (peripheral vascular disease) with claudication ([facility] [address] 9/7/2016    Skin cancer, basal cell     yrs ago    Spider veins 9/7/2016    Varicose veins of both lower extremities 9/7/2016     Past Surgical History:   Procedure Laterality Date    BREAST SURGERY Left     yrs ago benign    BRONCHOSCOPY N/A 1/31/2020    BRONCHOSCOPY EBUS performed by Alexandria Mejia MD at [facility] N/A 1/31/2020    BRONCHOSCOPY NAVIGATIONAL performed by Alexandria Mejia MD at [facility]  1/31/2020    BRONCHOSCOPY ALVEOLAR LAVAGE performed by Alexandria Mejia MD at [facility]  1/31/2020    BRONCHOSCOPY/TRANSBRONCHIAL NEEDLE BIOPSY performed by Alexandria Mejia MD at [facility]  1/31/2020    BRONCHOSCOPY/TRANSBRONCHIAL NEEDLE BIOPSY ADDL LOBE performed by Alexandria Mejia MD at [place] [address]    ablation [facility]    COLONOSCOPY      [place]     COLONOSCOPY N/A 8/11/2021    COLONOSCOPY DIAGNOSTIC performed by Mino Mejia MD at [address] [place] [address] N/A 8/10/2021    EGD ESOPHAGOGASTRODUODENOSCOPY performed by Mino Mejia MD at [facility] ENDOSCOPY     Family History   Problem Relation Age of Onset    Breast Cancer Mother     Diabetes Father     Breast Cancer Maternal Aunt          Social History:   Social History     Socioeconomic History    Marital status:      Spouse name: Not on file    Number of children: Not on file    Years of education: Not on file    Highest education level: Not on file   Occupational History    Occupation: sales   Tobacco Use    Smoking status: Former     Packs/day: 0.50     Years: 40.00     Pack years: 20.00     Types: Cigarettes     Quit date: 2011     Years since quittin.2    Smokeless tobacco: Former     Quit date: 2011   Substance and Sexual Activity    Alcohol use: No    Drug use: No    Sexual activity: Yes     Partners: Male   Other Topics Concern    Not on file   Social History Narrative    Not on file     Social Determinants of Health     Financial Resource Strain: Low Risk     Difficulty of Paying Living Expenses: Not hard at all   Food Insecurity: No Food Insecurity    Worried About Running Out of Food in the Last Year: Never true    [address] [employer] Transportation in the Last Year: Never true   Transportation Needs: Not on file   Physical Activity: Insufficiently Active    Days of Exercise per Week: 1 day    Minutes of Exercise per Session: 20 min   Stress: Not on file   Social Connections: Not on file   Intimate Partner Violence: Not on file   Housing Stability: Not on file       Vaccines:    Immunization History   Administered Date(s) Administered    COVID-19, PFIZER PURPLE top, DILUTE for use, (age 15 y+), 30mcg/0.3mL 2021, 2021, 2021    Influenza Virus Vaccine 2011, 10/22/2012, 10/27/2015    Influenza, FLUAD, (age 72 y+), Adjuvanted, 0.5mL 10/13/2020, 10/22/2021, 2022    Influenza, High Dose (Fluzone 65 yrs and older) 10/25/2017, 10/22/2018    Influenza, Triv, inactivated, subunit, adjuvanted, IM (Fluad 65 yrs and older) 10/17/2019    Pneumococcal Conjugate 13-valent ([id number]) 2017    Pneumococcal Polysaccharide ([id number]) 09/05/2018    Tdap (Boostrix, Adacel) 09/04/2012        Home Meds: Medications Prior to Admission: albuterol (PROVENTIL;VENTOLIN) 2 MG/5ML syrup, Take 5 mLs by mouth 3 times daily  Respiratory Therapy Supplies (NEBULIZER MASK ADULT) MISC, 1 Units by Does not apply route 4 times daily as needed (dyspnea)  Nebulizers (PORTABLE COMPRESSOR NEBULIZER) MISC, 1 Units by Does not apply route 4 times daily  predniSONE (DELTASONE) 10 MG tablet, Take 3 tabs po qd for 5 days, then 2 tabs po qd for 5 days then 1 tab po qd for 5 days then stop.  triamcinolone (KENALOG) 0.1 % cream, APPLY A SMALL AMOUNT TO THE AFFECTED AREA TWO TIMES A DAY.   ELIQUIS 2.5 MG TABS tablet,   vitamin B-12 500 MCG tablet, Take 1 tablet by mouth daily  metoprolol tartrate (LOPRESSOR) 25 MG tablet, Take 0.5 tablets by mouth 2 times daily  amiodarone (CORDARONE) 200 MG tablet, Take 200 mg by mouth daily  albuterol sulfate HFA (VENTOLIN HFA) 108 (90 Base) MCG/ACT inhaler, Inhale 2 puffs into the lungs 4 times daily as needed for Wheezing  furosemide (LASIX) 40 MG tablet, TAKE ONE TABLET BY MOUTH EVERY DAY  atorvastatin (LIPITOR) 40 MG tablet, Take 40 mg by mouth daily   Handicap Placard MISC, by Does not apply route Duration: Lifetime    CURRENT MEDS :  Scheduled Meds:   amiodarone  200 mg Oral Daily    atorvastatin  40 mg Oral Daily    apixaban  2.5 mg Oral BID    [Held by provider] furosemide  40 mg Oral Daily    metoprolol tartrate  12.5 mg Oral BID    sodium chloride flush  5-40 mL IntraVENous 2 times per day    ipratropium-albuterol  1 ampule Inhalation Q4H    spironolactone  12.5 mg Oral Daily    potassium chloride  40 mEq Oral Once    predniSONE  40 mg Oral Daily    furosemide  40 mg IntraVENous Once    azithromycin  500 mg Oral Daily    Followed by    Pascual Flowers ON 11/20/2022] azithromycin  250 mg Oral Daily       Continuous Infusions:   sodium chloride         No Known Allergies    REVIEW OF SYSTEMS:  REVIEW OF SYMPTOMS:  Review of Systems   Constitutional:  Negative for chills, fatigue and fever. HENT: Negative. Eyes: Negative. Respiratory:  Positive for cough and shortness of breath. Cardiovascular:  Negative for chest pain and leg swelling. Gastrointestinal:  Negative for abdominal pain, nausea and vomiting. Endocrine: Negative for cold intolerance and heat intolerance. Genitourinary:  Negative for difficulty urinating and dysuria. Musculoskeletal: Negative. Skin:  Negative for color change and rash. Allergic/Immunologic: Negative for environmental allergies and immunocompromised state. Neurological:  Negative for dizziness and weakness. Psychiatric/Behavioral:  Negative for agitation and confusion. OBJECTIVE:   /79   Pulse (!) 107   Temp 97.9 °F (36.6 °C) (Oral)   Resp 18   Wt 124 lb (56.2 kg)   SpO2 92%   BMI 20.01 kg/m²   SpO2 Readings from Last 1 Encounters:   11/19/22 92%        I/O:  No intake or output data in the 24 hours ending 11/19/22 1037                   PHYSICAL EXAM:  Physical Exam  Constitutional:       General: She is not in acute distress. HENT:      Head: Normocephalic and atraumatic. Mouth/Throat:      Pharynx: No oropharyngeal exudate. Eyes:      General: No scleral icterus. Conjunctiva/sclera: Conjunctivae normal.   Neck:      Trachea: No tracheal deviation. Cardiovascular:      Rate and Rhythm: Normal rate. Heart sounds: Normal heart sounds. Pulmonary:      Effort: Pulmonary effort is normal.      Breath sounds: Rales present. Abdominal:      Palpations: Abdomen is soft. Tenderness: There is no abdominal tenderness. Musculoskeletal:         General: No swelling or deformity. Cervical back: Neck supple. Lymphadenopathy:      Cervical: No cervical adenopathy. Skin:     General: Skin is warm. Findings: No rash. Neurological:      General: No focal deficit present.       Mental Status: She is alert and oriented to person, place,

## 2022-11-19 NOTE — H&P
[address]  HISTORY AND PHYSICAL             Date: 11/19/2022        Patient Name: Penny Ferguson     YOB: 1950      Age:  74 y.o. Chief Complaint     Chief Complaint   Patient presents with    Shortness of Breath     A few weeks post covid, feeling sob, SpO2 has been in 70's to 80s all day            History Obtained From   chart review and the patient    History of Present Illness   Penny Ferguson is a 74 y.o. female with PMHx of hypertrophic cardiomyopathy, chronic diastolic heart failure, paroxysmal atrial fibrillation anticoagulated with Eliquis 2.5 mg twice daily, CVA, lung adenocarcinoma in 2020 status postradiation, coccidiomycosis infection and COVID last month, COPD with recommendation for as needed home oxygen, hypertension, hyperlipidemia, and CKD who presented with shortness of breath. Patient states she tested positive for COVID about a month ago, and has subsequently had 2 negative tests, however in the past week or so she has been having increasing shortness of breath. This morning, she checked her pulse ox and it dropped to 70% which prompted her presentation. She has also been experiencing increasing cough productive of clear sputum. She follows with cardiology and pulmonology at the [facility]. Cardiology Dr. Enid Caba, pulmonology Dr. Dariel Hinojosa. She states she was advised to get home as needed home oxygen, however she was unable to find a company that could provide the oxygen for her. ED Course   In the ED, patient was afebrile with blood pressure 103/51 and pulse ox 94% on 3 L. Work-up significant for hypokalemia 3.4, proBNP 17,580, troponin 32 to 29 on repeat. Anemia with hemoglobin 11.4. Positive COVID test in the ED. Chest x-ray consistent with mild CHF. Patient was given 1 DuoNeb and Solu-Medrol 125 mg. Decision was made to admit the patient for acute hypoxic respiratory failure.     Past Medical History     Past apply route 4 times daily 10/25/22   Wilman Jointeden Hidalgo DO   predniSONE (DELTASONE) 10 MG tablet Take 3 tabs po qd for 5 days, then 2 tabs po qd for 5 days then 1 tab po qd for 5 days then stop. 10/25/22   Nu Hidalgo DO   triamcinolone (KENALOG) 0.1 % cream APPLY A SMALL AMOUNT TO THE AFFECTED AREA TWO TIMES A DAY. 9/9/22   Historical Provider, MD   ELIQUSYED 2.5 MG TABS tablet  2/3/22   Historical Provider, MD   vitamin B-12 500 MCG tablet Take 1 tablet by mouth daily 8/12/21   Cynthea Runner, MD   metoprolol tartrate (LOPRESSOR) 25 MG tablet Take 0.5 tablets by mouth 2 times daily 8/12/21   Obie Ellington MD   amiodarone (CORDARONE) 200 MG tablet Take 200 mg by mouth daily    Historical Provider, MD   albuterol sulfate HFA (VENTOLIN HFA) 108 (90 Base) MCG/ACT inhaler Inhale 2 puffs into the lungs 4 times daily as needed for Wheezing 6/21/21   Nu Hidalgo DO   furosemide (LASIX) 40 MG tablet TAKE ONE TABLET BY MOUTH EVERY DAY 9/7/19   Historical Provider, MD   atorvastatin (LIPITOR) 40 MG tablet Take 40 mg by mouth daily  8/8/19   Historical Provider, MD   Handicap Placard [facility] by Does not apply route Duration: Lifetime 9/5/18   Wilman Jointeden Hidalgo DO        Allergies   Patient has no known allergies. Social History     Social History       Tobacco History       Smoking Status  Former Quit Date  9/7/2011 Smoking Frequency  0.50 packs/day for 40.00 years (20.00 pk-yrs) Smoking Tobacco Type  Cigarettes quit in 9/7/2011      Smokeless Tobacco Use  Former Quit Date  9/7/2011              Alcohol History       Alcohol Use Status  No              Drug Use       Drug Use Status  No              Sexual Activity       Sexually Active  Yes Partners  Male                    Family History     Family History   Problem Relation Age of Onset    Breast Cancer Mother     Diabetes Father     Breast Cancer Maternal Aunt        Review of Systems   Review of Systems   Constitutional:  Positive for fatigue.  Negative for chills and fever. HENT:  Negative for congestion, rhinorrhea and sore throat. Eyes:  Negative for pain and visual disturbance. Respiratory:  Positive for cough and shortness of breath. Negative for wheezing. Cardiovascular:  Negative for chest pain and palpitations. Gastrointestinal:  Negative for abdominal pain, diarrhea, nausea and vomiting. Genitourinary:  Negative for dysuria and hematuria. Musculoskeletal:  Negative for arthralgias and myalgias. Skin:  Negative for rash and wound. Neurological:  Negative for dizziness and headaches. Physical Exam   /79   Pulse (!) 104   Temp 97.9 °F (36.6 °C) (Oral)   Resp 18   Wt 124 lb (56.2 kg)   SpO2 94%   BMI 20.01 kg/m²     Physical Exam  Vitals and nursing note reviewed. Constitutional:       General: She is not in acute distress. Appearance: Normal appearance. HENT:      Head: Normocephalic and atraumatic. Nose: No congestion or rhinorrhea. Mouth/Throat:      Mouth: Mucous membranes are moist.      Pharynx: Oropharynx is clear. Eyes:      General:         Right eye: No discharge. Left eye: No discharge. Cardiovascular:      Rate and Rhythm: Normal rate. Rhythm irregular. Heart sounds: No murmur heard. Pulmonary:      Effort: Pulmonary effort is normal.      Breath sounds: Rales (Bibasilar) present. No wheezing or rhonchi. Comments: 3 L nasal cannula oxygen on exam  Abdominal:      General: Bowel sounds are normal.      Palpations: Abdomen is soft. Tenderness: There is no abdominal tenderness. Musculoskeletal:      Cervical back: No rigidity. Right lower leg: Edema (1+ pitting edema bilaterally) present. Left lower leg: Edema present. Skin:     General: Skin is warm and dry. Neurological:      Mental Status: She is alert and oriented to person, place, and time. Mental status is at baseline.        Labs      Recent Results (from the past 24 hour(s))   CMP    Collection Time: 11/19/22  4:27 AM   Result Value Ref Range    Sodium 137 132 - 146 mmol/L    Potassium 3.4 (L) 3.5 - 5.0 mmol/L    Chloride 102 98 - 107 mmol/L    CO2 21 (L) 22 - 29 mmol/L    Anion Gap 14 7 - 16 mmol/L    Glucose 100 (H) 74 - 99 mg/dL    BUN 23 6 - 23 mg/dL    Creatinine 1.1 (H) 0.5 - 1.0 mg/dL    Est, Glom Filt Rate 52 >=60 mL/min/1.73    Calcium 8.7 8.6 - 10.2 mg/dL    Total Protein 6.0 (L) 6.4 - 8.3 g/dL    Albumin 3.1 (L) 3.5 - 5.2 g/dL    Total Bilirubin 1.2 0.0 - 1.2 mg/dL    Alkaline Phosphatase 94 35 - 104 U/L    ALT 30 0 - 32 U/L    AST 39 (H) 0 - 31 U/L   CBC with Auto Differential    Collection Time: 11/19/22  4:27 AM   Result Value Ref Range    WBC 7.3 4.5 - 11.5 E9/L    RBC 3.81 3.50 - 5.50 E12/L    Hemoglobin 11.4 (L) 11.5 - 15.5 g/dL    Hematocrit 36.5 34.0 - 48.0 %    MCV 95.8 80.0 - 99.9 fL    MCH 29.9 26.0 - 35.0 pg    MCHC 31.2 (L) 32.0 - 34.5 %    RDW 16.9 (H) 11.5 - 15.0 fL    Platelets 078 119 - 220 E9/L    MPV 9.6 7.0 - 12.0 fL    Neutrophils % 82.6 (H) 43.0 - 80.0 %    Immature Granulocytes % 0.8 0.0 - 5.0 %    Lymphocytes % 7.4 (L) 20.0 - 42.0 %    Monocytes % 8.6 2.0 - 12.0 %    Eosinophils % 0.5 0.0 - 6.0 %    Basophils % 0.1 0.0 - 2.0 %    Neutrophils Absolute 6.06 1.80 - 7.30 E9/L    Immature Granulocytes # 0.06 E9/L    Lymphocytes Absolute 0.54 (L) 1.50 - 4.00 E9/L    Monocytes Absolute 0.63 0.10 - 0.95 E9/L    Eosinophils Absolute 0.04 (L) 0.05 - 0.50 E9/L    Basophils Absolute 0.01 0.00 - 0.20 E9/L    Poikilocytes 1+     [place] Cells 1+     Ovalocytes 1+    Troponin    Collection Time: 11/19/22  4:27 AM   Result Value Ref Range    Troponin, High Sensitivity 32 (H) 0 - 9 ng/L   Brain Natriuretic Peptide    Collection Time: 11/19/22  4:27 AM   Result Value Ref Range    Pro-BNP 17,580 (H) 0 - 450 pg/mL   COVID-19, Rapid    Collection Time: 11/19/22  4:27 AM    Specimen: Nasopharyngeal Swab   Result Value Ref Range    SARS-CoV-2, NAAT DETECTED (A) Not Detected   RAPID INFLUENZA A/B ANTIGENS    Collection Time: 11/19/22  4:27 AM    Specimen: Nasopharyngeal   Result Value Ref Range    Influenza A by PCR Not Detected Not Detected    Influenza B by PCR Not Detected Not Detected   EKG 12 Lead    Collection Time: 11/19/22  4:59 AM   Result Value Ref Range    Ventricular Rate 98 BPM    Atrial Rate 208 BPM    QRS Duration 104 ms    Q-T Interval 388 ms    QTc Calculation (Bazett) 495 ms    R Axis -44 degrees    T Axis 169 degrees   Troponin    Collection Time: 11/19/22  6:52 AM   Result Value Ref Range    Troponin, High Sensitivity 29 (H) 0 - 9 ng/L        Imaging/Diagnostics Last 24 Hours   XR CHEST PORTABLE    Result Date: 11/19/2022  EXAMINATION: ONE XRAY VIEW OF THE CHEST11/19/2022 TECHNIQUE: Frontal view submitted COMPARISON: None. HISTORY: ORDERING SYSTEM PROVIDED HISTORY: SOB TECHNOLOGIST PROVIDED HISTORY: Reason for exam:->SOB FINDINGS: The lungs are clear without focal consolidation, large pleural effusion, or pneumothorax. The cardiomediastinal silhouette is enlarged. Pulmonary vascularity is indistinct. .     Mild CHF.        Assessment      Hospital Problems             Last Modified POA    * (Principal) Respiratory failure with hypoxia ([address] 11/19/2022 Yes    Chronic diastolic congestive heart failure ([address] 11/19/2022 Yes    Primary pulmonary coccidioidomycosis ([address] 11/19/2022 Yes    Pulmonary hypertension due to left heart disease ([address] 11/19/2022 Yes    Pulmonary nodule following infection by Coccidioides 11/19/2022 Yes    COVID-19 11/19/2022 Yes    Acute on chronic diastolic CHF (congestive heart failure) ([address] 11/19/2022 Yes    Prolonged Q-T interval on ECG 11/19/2022 Yes    Paroxysmal atrial fibrillation ([address] 11/19/2022 Yes    Cardiomyopathy ([address] 11/19/2022 Yes    Hyperlipidemia 11/19/2022 Yes    Hypertension 11/19/2022 Yes    Pulmonary emphysema ([address] 11/19/2022 Yes    Chronic obstructive pulmonary disease ([address] 11/19/2022 Yes    Shortness of breath 11/19/2022 Yes       Plan Acute Hypoxic Respiratory Failure secondary to COVID 19 Infection and/or COPD  COVID positive 1 month ago and again today on 11/19. Received Duoneb and solumedrol 125mg once in ED. Hx of Lung Cancer and primary pulmonary coccidioidomycosis; follows with Dr. Raquel Romero at [facility]. Current concern for COPD exacerbation given new cough and sputum production with increasing shortness of breath, however does have crackles at bilateral bases and was positive for COVID. Treat with prednisone 40 mg daily for 5 days  Unable to have azithromycin as patient's QTC is prolonged  DuoNebs every 4 hours  Daily CBC, CMP  Consult Pulmonology, appreciate input; Dr. Carlos Burroughs recommends consulting cardiology and diuresis at this time as well as home oxygen  Cardiology consulted    Possible CHF Exacerbation, cause/contributing factor of respiratory failure with hypoxia? BNP 96354 last echo was in 2019 and showed severe LVH without outflow obstruction, EF 56%, akinetic basal inferior segment, severely dilated left atrium, mitral regurg, tricuspid regurg, pulmonic valve regurg. On Lasix 40 mg daily at home, without potassium supplementation. Had not missed any doses except today when she presented to the ED. Lasix 40 mg IV once, Klor-Con 40 mEq daily while on Lasix  Pulmonology recommends consulting cardiology given significant comorbidities, cardiology consulted    Paroxysmal Atrial Fibrillation  Anticoagulated with Eliquis 2.5mg twice daily. Amiodarone 200 mg daily per [facility] Cardiology. Currently rate controlled  Continue amiodarone 200 mg daily    Hypokalemia  Potassium 3.4 in ED, not on potassium with her home lasix. Replete  Potassium 40 mEq daily  CMP daily    Elevated Troponin  Troponin 32 to 29 in ED. EKG with nonspecific ST-T changes. Monitor, currently denies CP    Anemia  Hemoglobin 11.4 in ED, baseline appears to be about 11.5-12.0.   Daily CBC  Check iron studies  No signs of bleeding    CKD  Creatinine in ED 1.1, baseline creatinine about 1.7. Daily CMP    Hypertension with mild hypotension in ED  Monitor blood pressures  Lopressor 12.5mg twice daily    HLD  Continue lipitor 40mg daily      Consultations Ordered:  IP CONSULT TO FAMILY MEDICINE  IP CONSULT TO PULMONOLOGY  IP CONSULT TO CARDIOLOGY    DVT ppx: Eliquis 2.5 mg twice daily  GI ppx: Protonix 40 mg daily  Diet: General  Code: Full    Case discussed with attending physician Dr. Kiesha Perez.     Electronically signed by Olvin Angel DO on 11/19/22 at 6:29 AM EST

## 2022-11-19 NOTE — CONSULTS
INPATIENT CARDIOLOGY CONSULT     Reason for Consult: CHF    Cardiologist: Dr. Estelle Francisco    Requesting Physician: Dr. Phoebe Torres    Date of Consultation: 11/19/2022    HISTORY OF PRESENT ILLNESS:   Patient is a 74year old WF new to [facility]. She follows with [facility] and  for her cardiology care. She is being seen in consultation this hospital admission by Dr. Estelle Francisco for evaluation and recommendations regarding CHF. PMH: COPD on PRN supplemental oxygen therapy at home, R lung adenocarcinoma s/p XRT completed June 2022, former tobacco smoker, pulmonary nodules, history of CVA 2001, persistent AF on chronic Amiodarone/Eliquis therapy with history of prior AF ablation in 2018 and recurrent need for DCCV since then, SVT, hypertrophic nonobstructive cardiomyopathy, chronic HFpEF, HLD, HTN, CKD    Patient presented to [facility] on November 19, 2022 with complaints of shortness of breath. Patient reportedly tested positive for COVID-19 last month, and has been noting of persistent cough and shortness of breath. Her dyspnea has been progressively worsening over the past couple of days, with eventual dyspnea at rest.  She notes of being hypoxic at home, with oxygen saturations in the 70s to 80s. She was additionally noted to be hypoxic in the ED, and is currently requiring nasal cannula. She admits to peripheral edema bilaterally. During her initial COVID-19 infection, she had severe fatigue and felt very ill. She did not require hospital admission at that time. She does not currently wear supplemental oxygen at home. Denies chest pain, N/B, diaphoresis, dizziness, palpitations, near-syncope or syncope. Denies PND, orthopnea. Please note: past medical records were reviewed per electronic medical record (EMR) - see detailed reports under Past Medical/ Surgical History.    PAST MEDICAL HISTORY:    COPD  R lung adenocarcinoma s/p XRT completed June 2022  Former tobacco smoker   Pulmonary nodules  History of CVA in 2011  Persistent AF, on reduced dose Eliquis. Follows with Dr. Sita Nguyen diagnosed in 2011 in setting of acute CVA  S/p AF ablation in May 2018  S/p successful DCCV 5/2021 and 6/2021  Chronic amiodarone therapy  Maintaining SR during [facility] OV 11/10/2022  SVT  Hypertrophic nonobstructive cardiomyopathy  Chronic HFpEF  HLD, on statin therapy  HTN  COVID-19 infection 10/2022  CKD      CARDIAC TESTING    TTE ([facility], 2018)  CONCLUSIONS:   - Exam indication: Nonsustained atrial fibrillation   - There is a resting wall motion abnormality in the territory of the RCA. - The left ventricle is normal in size. There is moderate upper septal left   ventricular hypertrophy. Left ventricular systolic function is normal. EF = 56 ±   5% (2D biplane) Basal wall motion abnormalities, as seen on prior.   - The right ventricle is normal in size. Right ventricular systolic function is   normal.   - The left atrial cavity is severely dilated. - Prominent papillary muscles. No mitral valve FRANK at rest, mild (1+) MR, LVOT   peak gradient 6mmHg. No change with valsalva. - Estimated right ventricular systolic pressure is 50 mmHg consistent with   moderate pulmonary hypertension. Estimated right atrial pressure is 5 mmHg. - Exam was compared with the prior  echocardiographic exam performed on   4/9/2018. No major change. Amyl nitrite administered on prior echo, with peak LVOT    gradient of 11 mmHg. Event Monitor 8/2021, [facility]  Patient had a min HR of 35 bpm, max HR of 106 bpm, and avg HR of 48   bpm. Predominant underlying rhythm was Sinus Rhythm. 1 run of   Supraventricular Tachycardia occurred lasting 9 beats with a max rate of   106 bpm (avg 96 bpm). Junctional Rhythm was present. Isolated SVEs   were rare (<1.0%), SVE Couplets were rare (<1.0%), and SVE Triplets were   rare (<1.0%). Isolated VEs were rare (<1.0%), VE Couplets were rare   (<1.0%), and no VE Triplets were present.      PAST SURGICAL HISTORY:    Past Surgical History:   Procedure Laterality Date    BREAST SURGERY Left     yrs ago benign    BRONCHOSCOPY N/A 1/31/2020    BRONCHOSCOPY EBUS performed by Jeanmarie Morrow MD at [facility] N/A 1/31/2020    BRONCHOSCOPY NAVIGATIONAL performed by Jeanmarie Morrow MD at [facility]  1/31/2020    BRONCHOSCOPY ALVEOLAR LAVAGE performed by Jeanmarie Morrow MD at [facility]  1/31/2020    BRONCHOSCOPY/TRANSBRONCHIAL NEEDLE BIOPSY performed by Jeanmarie Morrow MD at [facility]  1/31/2020    BRONCHOSCOPY/TRANSBRONCHIAL NEEDLE BIOPSY ADDL LOBE performed by Jeanmarie Morrow MD at [place] [address]    ablation [facility] OF goviral [facility]    COLONOSCOPY      [place]     COLONOSCOPY N/A 8/11/2021    COLONOSCOPY DIAGNOSTIC performed by Louise Potter MD at [id number] [place] [address] N/A 8/10/2021    EGD ESOPHAGOGASTRODUODENOSCOPY performed by Louise Potter MD at [address]:  Prior to Admission medications    Medication Sig Start Date End Date Taking? Authorizing Provider   albuterol (PROVENTIL;VENTOLIN) 2 MG/5ML syrup Take 5 mLs by mouth 3 times daily 10/25/22   Luana Hidalgo, DO   Respiratory Therapy Supplies (NEBULIZER MASK ADULT) MISC 1 Units by Does not apply route 4 times daily as needed (dyspnea) 10/25/22   Luana Hidalgo, DO   Nebulizers (PORTABLE COMPRESSOR NEBULIZER) MISC 1 Units by Does not apply route 4 times daily 10/25/22   Luana Hidalgo, DO   predniSONE (DELTASONE) 10 MG tablet Take 3 tabs po qd for 5 days, then 2 tabs po qd for 5 days then 1 tab po qd for 5 days then stop.  10/25/22   Nu Hidalgo, DO   triamcinolone (KENALOG) 0.1 % cream APPLY A SMALL AMOUNT TO THE AFFECTED AREA TWO TIMES A DAY. 9/9/22   Historical Provider, MD   ELIQUSYED 2.5 MG TABS tablet  2/3/22   Historical Provider, MD   vitamin B-12 500 MCG tablet Take 1 tablet by mouth daily 8/12/21   Lizzeth Phillips MD   metoprolol tartrate (LOPRESSOR) 25 MG tablet Take 0.5 tablets by mouth 2 times daily 8/12/21   Lizzeth Phillips MD   amiodarone (CORDARONE) 200 MG tablet Take 200 mg by mouth daily    Historical Provider, MD   albuterol sulfate HFA (VENTOLIN HFA) 108 (90 Base) MCG/ACT inhaler Inhale 2 puffs into the lungs 4 times daily as needed for Wheezing 6/21/21   Nu Hidalgo DO   furosemide (LASIX) 40 MG tablet TAKE ONE TABLET BY MOUTH EVERY DAY 9/7/19   Historical Provider, MD   atorvastatin (LIPITOR) 40 MG tablet Take 40 mg by mouth daily  8/8/19   Historical Provider, MD   Handicap Placard MISC by Does not apply route Duration: Lifetime 9/5/18   Marsha Hidalgo DO       CURRENT MEDICATIONS:      Current Facility-Administered Medications:     amiodarone (CORDARONE) tablet 200 mg, 200 mg, Oral, Daily, Antionette A Bosak, DO, 200 mg at 11/19/22 1135    atorvastatin (LIPITOR) tablet 40 mg, 40 mg, Oral, Daily, Antionette A Bosak, DO, 40 mg at 11/19/22 1137    apixaban (ELIQUIS) tablet 2.5 mg, 2.5 mg, Oral, BID, Antionette A Bosak, DO, 2.5 mg at 11/19/22 1137    [Held by provider] furosemide (LASIX) tablet 40 mg, 40 mg, Oral, Daily, Antionette A Bosak, DO    metoprolol tartrate (LOPRESSOR) tablet 12.5 mg, 12.5 mg, Oral, BID, Antionette A Bosak, DO, 12.5 mg at 11/19/22 1135    sodium chloride flush 0.9 % injection 5-40 mL, 5-40 mL, IntraVENous, 2 times per day, Antionette A Bosak, DO, 10 mL at 11/19/22 1139    sodium chloride flush 0.9 % injection 5-40 mL, 5-40 mL, IntraVENous, PRN, Antionette A Bosak, DO    0.9 % sodium chloride infusion, , IntraVENous, PRN, Antionette A Bosak, DO    polyethylene glycol (GLYCOLAX) packet 17 g, 17 g, Oral, Daily PRN, Antionette Marquez DO    acetaminophen (TYLENOL) tablet 650 mg, 650 mg, Oral, Q6H PRN **OR** acetaminophen (TYLENOL) suppository 650 mg, 650 mg, Rectal, Q6H PRN, Antionette Marquez DO    prochlorperazine (COMPAZINE) tablet 5 mg, 5 mg, Oral, Q6H PRN, Antionette A Bosak, DO    ipratropium-albuterol (DUONEB) nebulizer solution 1 ampule, 1 ampule, Inhalation, Q4H, Antionette A Bosak, DO, 1 ampule at 11/19/22 1300    spironolactone (ALDACTONE) tablet 12.5 mg, 12.5 mg, Oral, Daily, Antionette A Bosak, DO, 12.5 mg at 11/19/22 1136    predniSONE (DELTASONE) tablet 40 mg, 40 mg, Oral, Daily, Antionette A Bosak, DO, 40 mg at 11/19/22 1135    azithromycin (ZITHROMAX) tablet 500 mg, 500 mg, Oral, Daily **FOLLOWED BY** [START ON 11/20/2022] azithromycin (ZITHROMAX) tablet 250 mg, 250 mg, Oral, Daily, Antionette A Noeak, DO    ALLERGIES:  Patient has no known allergies. SOCIAL HISTORY:    Patient is a former tobacco smoker, quit 2011. Smoked half a pack per day for 40+ years. No noted ETOH/illicit drug use. FAMILY HISTORY:   Non-contributory at this time due to patient's advanced age. REVIEW OF SYSTEMS:     Negative except as noted above in HPI      PHYSICAL EXAM:   /79   Pulse (!) 104   Temp 97.9 °F (36.6 °C) (Oral)   Resp 18   Wt 124 lb (56.2 kg)   SpO2 94%   BMI 20.01 kg/m²   Due to patient's COVID-19+ status and in droplet plus isolation, physical exam was not performed in order to preserve PPE and limit exposure. Physical exam to follow as per Dr. Catalina Rodriguez. DATA:    Telemetry: AF RVR --> converted to SR / ST with HR in the low 100s    Diagnostic:  All diagnostic testing and lab work thus far this admission reviewed in detail. CXR 11/19/2022  Impression  Mild CHF.     Labs:   CBC:   Recent Labs     11/19/22 0427   WBC 7.3   HGB 11.4*   HCT 36.5        BMP:   Recent Labs     11/19/22 0427      K 3.4*   CO2 21*   BUN 23   CREATININE 1.1*   LABGLOM 52   CALCIUM 8.7     FASTING LIPID PANEL:  Lab Results   Component Value Date/Time    CHOL 116 04/05/2022 10:56 AM    HDL 43 04/05/2022 10:56 AM    LDLCALC 55 04/05/2022 10:56 AM    TRIG 92 04/05/2022 10:56 AM     LIVER PROFILE:  Recent Labs     11/19/22  0427   AST 39*   ALT 30 LABALBU 3.1*      Ref Range & Units 11/19/22 0652 11/19/22 0427   Troponin, High Sensitivity 0 - 9 ng/L 29 High   32 High  CM       Ref Range & Units 11/19/22 0427   Pro-BNP 0 - 450 pg/mL 17,580 High        Ref Range & Units 11/19/22 0427   SARS-CoV-2, NAAT Not Detected DETECTED Abnormal       Component Ref Range & Units 11/19/22 0427   Influenza A by PCR Not Detected Not Detected    Influenza B by PCR Not Detected Not Detected           ASSESSMENT/RECOMMENDATIONS:  Mild acute on chronic HFpEF  proBNP 17,580, no prior to compare  Weight 120 lbs 11/10/2022 --> now 124 lbs  Hypertrophic nonobstructive cardiomyopathy and follows with [facility]  Acute hypoxic respiratory failure, on 2L NC  COVID-19+ infection  COPD with possible exacerbation  Persistent AF, on chronic Amiodarone/Eliquis therapy. S/p AF ablation in May 2018, s/p successful DCCV 5/20/2021 and 6/20/2021. Follows with EP at [facility]  AF with CVR on initial EKG --> now appears to be ST / SR with HR in the low 100s on telemetry  History of SVT  History of CVA 2011  R lung adenocarcinoma s/p XRT completed in June 2022  HTN, controlled  HLD, on statin therapy  Elevated AST  Hypokalemia, supplemented  CKD    Will give one more dose of IV Lasix 40 mg tomorrow AM  Continue strict intake and output, daily weights  Monitor renal function and electrolytes  Continue other cardiac medications the same at this time   Rest as per primary service and other consultants  Patient should follow up with her EP/cardiologist in [place] after discharge        Above as per Dr. Alex Eisenberg -- A+P discussed and made in collaboration with him. Further recommendations to follow      NOTE: This report was transcribed using voice recognition software. Every effort was made to ensure accuracy; however, inadvertent computerized transcription errors may be present.       Jose Brian, [address] [place] [address], [place] [address] Cardiology    Electronically signed by Latoya Don PA-C on 11/19/2022 at 1:41 PM Inpatient CARDIOLOGY Consultation       Our FLAKO exam, assessment reviewed and reflect my work. I spent > 51% of the total time involved with completing the encounter. The total time included the following:  Independently interviewing the patient (HPI, ROS, PMH, PSH, FMH, SH, allergies, and medications)  Reviewing available records, results of previously ordered testing/procedures, and current problem list  Independently performing a medically appropriate examination  Reviewing the above documentation completed by the FLAKO  Ordering medications, tests, and/or procedures as required  Formulating the assessment/plan and reviewing the rationale for the above recommendations  Counseling/educating the patient  Coordinating care with other healthcare professionals  Communicating results to the patient's family/caregiver as available  Documenting clinical information in the patient's electronic health record     Reason for Consult:  CHF     Date of Consultation: 11/19/2022     Patient previously known to Dr. Elijah Davila: 74year old with history of persistent AF on chronic Amiodarone/Eliquis therapy with history of prior AF ablation in 2018 and recurrent need for DCCV since then, , COPD on PRN supplemental oxygen therapy at home, R lung adenocarcinoma s/p XRT completed June 2022, former tobacco smoker, pulmonary nodules, history of CVA 2001,SVT, hypertrophic nonobstructive cardiomyopathy, chronic HFpEF, HLD, HTN, CKD presented to [address] [place] [address] on November 19, 2022 with complaints of shortness of breath. Patient reportedly tested positive for COVID-19 last month, and has been noting of persistent cough and shortness of breath. Her dyspnea has been progressively worsening over the past couple of days, with eventual dyspnea at rest.  She notes of being hypoxic at home, with oxygen saturations in the 70s to 80s.   She was additionally noted to be hypoxic in the ED, and is currently requiring nasal cannula. She admits to peripheral edema bilaterally. During her initial COVID-19 infection, she had severe fatigue and felt very ill. She did not require hospital admission at that time. She does not currently wear supplemental oxygen at home. Denies chest pain, N/B, diaphoresis, dizziness, palpitations, near-syncope or syncope. Denies PND, orthopnea. States complaint with her medications; Saw her Cardiology last month-Recommended Echo after next visit     REVIEW OF SYSTEMS:   Review of rest of 12 systems negative except as mentioned in HPI. Please note: past medical records were reviewed per electronic medical record (EMR) - see detailed reports under Past Medical/ Surgical History.          Past Medical History:    Past Medical History        Past Medical History:   Diagnosis Date    Atrial fibrillation ([facility] [address]       Dr. Katey Morales [facility]) 2020     lung    Cardiomyopathy [facility])       Dr. Cassi Valaedz, [facility]    CVA (cerebral vascular accident) [facility])       2014    Hyperlipidemia      Hypertension      PVD (peripheral vascular disease) with claudication ([facility] [address] 9/7/2016    Skin cancer, basal cell       yrs ago    Spider veins 9/7/2016    Varicose veins of both lower extremities 9/7/2016            Past Surgical History:    Past Surgical History         Past Surgical History:   Procedure Laterality Date    BREAST SURGERY Left       yrs ago benign    BRONCHOSCOPY N/A 1/31/2020     BRONCHOSCOPY EBUS performed by Dana Mathur MD at [address] [facility] N/A 1/31/2020     BRONCHOSCOPY NAVIGATIONAL performed by Dana Mathur MD at [facility]   1/31/2020     BRONCHOSCOPY ALVEOLAR LAVAGE performed by Dana Mathur MD at [facility]   1/31/2020     BRONCHOSCOPY/TRANSBRONCHIAL NEEDLE BIOPSY performed by Dana Mathur MD at [address] [place] [address]   1/31/2020     BRONCHOSCOPY/TRANSBRONCHIAL NEEDLE BIOPSY ADDL LOBE performed by Dana Mathur MD at [facility] ENDOSCOPY    CARDIAC SURGERY   2017     ablation [facility]    COLONOSCOPY         [place]     COLONOSCOPY N/A 2021     COLONOSCOPY DIAGNOSTIC performed by Beryl Barahona MD at [address] [place] [address] N/A 8/10/2021     EGD ESOPHAGOGASTRODUODENOSCOPY performed by Beryl Barahona MD at [facility] ENDOSCOPY               Allergies:    Patient has no known allergies.      Social History:    Social History               Socioeconomic History    Marital status:        Spouse name: Not on file    Number of children: Not on file    Years of education: Not on file    Highest education level: Not on file   Occupational History    Occupation: [employer]   Tobacco Use    Smoking status: Former       Packs/day: 0.50       Years: 40.00       Pack years: 20.00       Types: Cigarettes       Quit date: 2011       Years since quittin.2    Smokeless tobacco: Former       Quit date: 2011   Substance and Sexual Activity    Alcohol use: No    Drug use: No    Sexual activity: Yes       Partners: Male   Other Topics Concern    Not on file   Social History Narrative    Not on file      Social Determinants of Health          Financial Resource Strain: Low Risk     Difficulty of Paying Living Expenses: Not hard at all   Food Insecurity: No Food Insecurity    Worried About Running Out of Food in the Last Year: Never true    Veronica of Food in the Last Year: Never true   Transportation Needs: Not on file   Physical Activity: Insufficiently Active    Days of Exercise per Week: 1 day    Minutes of Exercise per Session: 20 min   Stress: Not on file   Social Connections: Not on file   Intimate Partner Violence: Not on file   Housing Stability: Not on file            Family History:   Family History         Family History   Problem Relation Age of Onset    Breast Cancer Mother      Diabetes Father      Breast Cancer Maternal Aunt                 Medications Prior to admit: [address] [place] [address] Medications           Prior to Admission medications    Medication Sig Start Date End Date Taking? Authorizing Provider   albuterol (PROVENTIL;VENTOLIN) 2 MG/5ML syrup Take 5 mLs by mouth 3 times daily 10/25/22     Luana Hidalgo DO   Respiratory Therapy Supplies (NEBULIZER MASK ADULT) MISC 1 Units by Does not apply route 4 times daily as needed (dyspnea) 10/25/22     Luana Hidalgo DO   Nebulizers (PORTABLE COMPRESSOR NEBULIZER) MISC 1 Units by Does not apply route 4 times daily 10/25/22     Luana Hidalgo DO   predniSONE (DELTASONE) 10 MG tablet Take 3 tabs po qd for 5 days, then 2 tabs po qd for 5 days then 1 tab po qd for 5 days then stop.  10/25/22     Nu Hidlago DO   triamcinolone (KENALOG) 0.1 % cream APPLY A SMALL AMOUNT TO THE AFFECTED AREA TWO TIMES A DAY. 9/9/22     Historical Provider, MD BARONE 2.5 MG TABS tablet   2/3/22     Historical Provider, MD   vitamin B-12 500 MCG tablet Take 1 tablet by mouth daily 8/12/21     Riya Rae MD   metoprolol tartrate (LOPRESSOR) 25 MG tablet Take 0.5 tablets by mouth 2 times daily 8/12/21     Sara Ellington MD   amiodarone (CORDARONE) 200 MG tablet Take 200 mg by mouth daily       Historical Provider, MD   albuterol sulfate HFA (VENTOLIN HFA) 108 (90 Base) MCG/ACT inhaler Inhale 2 puffs into the lungs 4 times daily as needed for Wheezing 6/21/21     Nu Hidalgo DO   furosemide (LASIX) 40 MG tablet TAKE ONE TABLET BY MOUTH EVERY DAY 9/7/19     Historical Provider, MD   atorvastatin (LIPITOR) 40 MG tablet Take 40 mg by mouth daily  8/8/19     Historical Provider, MD   Handicap Placard [facility] by Does not apply route Duration: Lifetime 9/5/18     Luana Hidalgo DO               DATA:       ECG - Reviewed      Tele strips: Reviewed     Diagnostic:     No intake or output data in the 24 hours ending 11/19/22 1424     IMAGING STUDIES: Reviewed        LABS:       Cardiac enzymes:       Recent Labs     11/19/22  1487 11/19/22  5361 TROPHS 32* 29*      CBC:       Recent Labs     11/19/22 0427   WBC 7.3   HGB 11.4*   HCT 36.5         BMP:       Recent Labs     11/19/22 0427      K 3.4*   CO2 21*   BUN 23   CREATININE 1.1*   LABGLOM 52   CALCIUM 8.7      Mag: No results for input(s): MG in the last 72 hours. Phos: No results for input(s): PHOS in the last 72 hours. TSH: No results for input(s): TSH in the last 72 hours. HgA1c: No results found for: LABA1C  No results found for: EAG  proBNP:       Recent Labs     11/19/22 0427   PROBNP 17,580*      PT/INR: No results for input(s): PROTIME, INR in the last 72 hours. APTT:No results for input(s): APTT in the last 72 hours. FASTING LIPID PANEL:        Lab Results   Component Value Date/Time     CHOL 116 04/05/2022 10:56 AM     HDL 43 04/05/2022 10:56 AM     LDLCALC 55 04/05/2022 10:56 AM     TRIG 92 04/05/2022 10:56 AM      LIVER PROFILE:      Recent Labs     11/19/22 0427   AST 39*   ALT 30   LABALBU 3.1*         Current Inpatient Medications:  Scheduled Medications    amiodarone  200 mg Oral Daily    atorvastatin  40 mg Oral Daily    apixaban  2.5 mg Oral BID    metoprolol tartrate  12.5 mg Oral BID    sodium chloride flush  5-40 mL IntraVENous 2 times per day    ipratropium-albuterol  1 ampule Inhalation Q4H    spironolactone  12.5 mg Oral Daily    predniSONE  40 mg Oral Daily    [START ON 11/20/2022] pantoprazole  40 mg Oral QAM AC    [START ON 11/20/2022] potassium chloride  40 mEq Oral Daily with breakfast            IV Infusions (if any): Infusions Meds    sodium chloride              PHYSICAL EXAM:      /79   Pulse (!) 104   Temp 97.9 °F (36.6 °C) (Oral)   Resp 18   Wt 124 lb (56.2 kg)   SpO2 94%   BMI 20.01 kg/m²      CONST:  Well developed, appears stated age. Awake, alert and no apparent distress. HEENT:   Head- Normocephalic  Eyes- Conjunctivae pink, no icterus  Throat- Oral mucosa moist  Neck-  No stridor, no jugular venous distention.  No carotid

## 2022-11-19 NOTE — PROGRESS NOTES
Pulse ox was 88% on room air at rest. Ambulated patient on room air. Oxygen saturation was 86% on room air while ambulating. Oxygen applied. Recovery pulse ox was 93% on 2 liters of oxygen while ambulating.

## 2022-11-20 VITALS
BODY MASS INDEX: 20.05 KG/M2 | SYSTOLIC BLOOD PRESSURE: 128 MMHG | OXYGEN SATURATION: 93 % | TEMPERATURE: 98.2 F | RESPIRATION RATE: 18 BRPM | HEART RATE: 112 BPM | WEIGHT: 124.25 LBS | DIASTOLIC BLOOD PRESSURE: 78 MMHG

## 2022-11-20 PROBLEM — J96.91 RESPIRATORY FAILURE WITH HYPOXIA (HCC): Status: RESOLVED | Noted: 2022-11-19 | Resolved: 2022-11-20

## 2022-11-20 PROBLEM — U07.1 COVID-19: Status: RESOLVED | Noted: 2022-11-19 | Resolved: 2022-11-20

## 2022-11-20 PROBLEM — I50.33 ACUTE ON CHRONIC HEART FAILURE WITH PRESERVED EJECTION FRACTION (HCC): Status: RESOLVED | Noted: 2022-11-19 | Resolved: 2022-11-20

## 2022-11-20 LAB
ALBUMIN SERPL-MCNC: 3.1 G/DL (ref 3.5–5.2)
ALP BLD-CCNC: 103 U/L (ref 35–104)
ALT SERPL-CCNC: 31 U/L (ref 0–32)
ANION GAP SERPL CALCULATED.3IONS-SCNC: 13 MMOL/L (ref 7–16)
ANISOCYTOSIS: ABNORMAL
AST SERPL-CCNC: 43 U/L (ref 0–31)
BASOPHILS ABSOLUTE: 0.01 E9/L (ref 0–0.2)
BASOPHILS RELATIVE PERCENT: 0.1 % (ref 0–2)
BILIRUB SERPL-MCNC: 0.9 MG/DL (ref 0–1.2)
BUN BLDV-MCNC: 26 MG/DL (ref 6–23)
BURR CELLS: ABNORMAL
CALCIUM SERPL-MCNC: 8.8 MG/DL (ref 8.6–10.2)
CHLORIDE BLD-SCNC: 105 MMOL/L (ref 98–107)
CO2: 20 MMOL/L (ref 22–29)
CREAT SERPL-MCNC: 1.4 MG/DL (ref 0.5–1)
EOSINOPHILS ABSOLUTE: 0 E9/L (ref 0.05–0.5)
EOSINOPHILS RELATIVE PERCENT: 0 % (ref 0–6)
GFR SERPL CREATININE-BSD FRML MDRD: 39 ML/MIN/1.73
GLUCOSE BLD-MCNC: 132 MG/DL (ref 74–99)
HCT VFR BLD CALC: 35.4 % (ref 34–48)
HEMOGLOBIN: 10.9 G/DL (ref 11.5–15.5)
IMMATURE GRANULOCYTES #: 0.09 E9/L
IMMATURE GRANULOCYTES %: 1.2 % (ref 0–5)
LYMPHOCYTES ABSOLUTE: 0.52 E9/L (ref 1.5–4)
LYMPHOCYTES RELATIVE PERCENT: 7.2 % (ref 20–42)
MCH RBC QN AUTO: 29.7 PG (ref 26–35)
MCHC RBC AUTO-ENTMCNC: 30.8 % (ref 32–34.5)
MCV RBC AUTO: 96.5 FL (ref 80–99.9)
MONOCYTES ABSOLUTE: 0.6 E9/L (ref 0.1–0.95)
MONOCYTES RELATIVE PERCENT: 8.3 % (ref 2–12)
NEUTROPHILS ABSOLUTE: 6.04 E9/L (ref 1.8–7.3)
NEUTROPHILS RELATIVE PERCENT: 83.2 % (ref 43–80)
OVALOCYTES: ABNORMAL
PDW BLD-RTO: 17 FL (ref 11.5–15)
PLATELET # BLD: 278 E9/L (ref 130–450)
PMV BLD AUTO: 9.6 FL (ref 7–12)
POIKILOCYTES: ABNORMAL
POLYCHROMASIA: ABNORMAL
POTASSIUM REFLEX MAGNESIUM: 4.3 MMOL/L (ref 3.5–5)
RBC # BLD: 3.67 E12/L (ref 3.5–5.5)
SODIUM BLD-SCNC: 138 MMOL/L (ref 132–146)
TOTAL PROTEIN: 6 G/DL (ref 6.4–8.3)
WBC # BLD: 7.3 E9/L (ref 4.5–11.5)

## 2022-11-20 PROCEDURE — 80053 COMPREHEN METABOLIC PANEL: CPT

## 2022-11-20 PROCEDURE — 6370000000 HC RX 637 (ALT 250 FOR IP): Performed by: FAMILY MEDICINE

## 2022-11-20 PROCEDURE — 94640 AIRWAY INHALATION TREATMENT: CPT

## 2022-11-20 PROCEDURE — 85025 COMPLETE CBC W/AUTO DIFF WBC: CPT

## 2022-11-20 PROCEDURE — 99233 SBSQ HOSP IP/OBS HIGH 50: CPT | Performed by: INTERNAL MEDICINE

## 2022-11-20 PROCEDURE — 6370000000 HC RX 637 (ALT 250 FOR IP): Performed by: INTERNAL MEDICINE

## 2022-11-20 PROCEDURE — 2700000000 HC OXYGEN THERAPY PER DAY

## 2022-11-20 PROCEDURE — 2580000003 HC RX 258: Performed by: FAMILY MEDICINE

## 2022-11-20 PROCEDURE — 6360000002 HC RX W HCPCS: Performed by: PHYSICIAN ASSISTANT

## 2022-11-20 PROCEDURE — 36415 COLL VENOUS BLD VENIPUNCTURE: CPT

## 2022-11-20 RX ORDER — FUROSEMIDE 40 MG/1
40 TABLET ORAL DAILY
Status: DISCONTINUED | OUTPATIENT
Start: 2022-11-20 | End: 2022-11-20 | Stop reason: HOSPADM

## 2022-11-20 RX ORDER — PREDNISONE 20 MG/1
40 TABLET ORAL DAILY
Qty: 6 TABLET | Refills: 0 | Status: SHIPPED | OUTPATIENT
Start: 2022-11-21 | End: 2022-11-24

## 2022-11-20 RX ADMIN — PANTOPRAZOLE SODIUM 40 MG: 40 TABLET, DELAYED RELEASE ORAL at 05:08

## 2022-11-20 RX ADMIN — APIXABAN 2.5 MG: 2.5 TABLET, FILM COATED ORAL at 09:33

## 2022-11-20 RX ADMIN — SPIRONOLACTONE 12.5 MG: 25 TABLET ORAL at 09:33

## 2022-11-20 RX ADMIN — METOPROLOL TARTRATE 12.5 MG: 25 TABLET, FILM COATED ORAL at 09:33

## 2022-11-20 RX ADMIN — IPRATROPIUM BROMIDE AND ALBUTEROL SULFATE 1 AMPULE: 2.5; .5 SOLUTION RESPIRATORY (INHALATION) at 12:55

## 2022-11-20 RX ADMIN — IPRATROPIUM BROMIDE AND ALBUTEROL SULFATE 1 AMPULE: 2.5; .5 SOLUTION RESPIRATORY (INHALATION) at 09:26

## 2022-11-20 RX ADMIN — FUROSEMIDE 40 MG: 40 TABLET ORAL at 13:45

## 2022-11-20 RX ADMIN — IPRATROPIUM BROMIDE AND ALBUTEROL SULFATE 1 AMPULE: 2.5; .5 SOLUTION RESPIRATORY (INHALATION) at 03:41

## 2022-11-20 RX ADMIN — ATORVASTATIN CALCIUM 40 MG: 40 TABLET, FILM COATED ORAL at 09:33

## 2022-11-20 RX ADMIN — AMIODARONE HYDROCHLORIDE 200 MG: 200 TABLET ORAL at 09:33

## 2022-11-20 RX ADMIN — FUROSEMIDE 40 MG: 10 INJECTION, SOLUTION INTRAMUSCULAR; INTRAVENOUS at 09:33

## 2022-11-20 RX ADMIN — SODIUM CHLORIDE, PRESERVATIVE FREE 10 ML: 5 INJECTION INTRAVENOUS at 09:33

## 2022-11-20 RX ADMIN — POTASSIUM CHLORIDE 40 MEQ: 1500 TABLET, EXTENDED RELEASE ORAL at 09:33

## 2022-11-20 RX ADMIN — PREDNISONE 40 MG: 20 TABLET ORAL at 09:33

## 2022-11-20 ASSESSMENT — ENCOUNTER SYMPTOMS
COUGH: 0
WHEEZING: 1
SORE THROAT: 0
DIARRHEA: 0
CONSTIPATION: 0
SHORTNESS OF BREATH: 0
BACK PAIN: 0

## 2022-11-20 NOTE — PROGRESS NOTES
Pulse ox was 91% on room air at rest.   Ambulated patient on room air. Oxygen saturation was 87% on room air while ambulating. Oxygen applied.    Recovery pulse ox was 93% on 2 liters of oxygen while ambulating      Electronically signed by Ryder Clarke RN on 11/20/2022 at 11:23 AM

## 2022-11-20 NOTE — PROGRESS NOTES
Arturo Payan M.D. Mae Caban M.D. Michaele Loud, M.D. Michael Walton D.O. Daily Pulmonary Progress Note    Patient:  Pilar Forrester 74 y.o. female MRN: [id number]     Date of Service: 11/20/2022        Subjective      Patient was seen and examined. Overall feels better, still having some dyspnea with exertion. Objective   Vitals: /78   Pulse (!) 112   Temp 98.2 °F (36.8 °C) (Oral)   Resp 18   Wt 124 lb 4 oz (56.4 kg)   SpO2 93%   BMI 20.05 kg/m²     I/O:    Intake/Output Summary (Last 24 hours) at 11/20/2022 1614  Last data filed at 11/20/2022 7072  Gross per 24 hour   Intake 180 ml   Output --   Net 180 ml       CURRENT MEDS :  Scheduled Meds:   furosemide  40 mg Oral Daily    metoprolol tartrate  25 mg Oral BID    amiodarone  200 mg Oral Daily    atorvastatin  40 mg Oral Daily    apixaban  2.5 mg Oral BID    sodium chloride flush  5-40 mL IntraVENous 2 times per day    ipratropium-albuterol  1 ampule Inhalation Q4H    spironolactone  12.5 mg Oral Daily    predniSONE  40 mg Oral Daily    pantoprazole  40 mg Oral QAM AC    potassium chloride  40 mEq Oral Daily with breakfast       Continuous Infusions:   sodium chloride         PRN Meds:  sodium chloride flush, sodium chloride, polyethylene glycol, acetaminophen **OR** acetaminophen, prochlorperazine      Physical Exam:  Physical Exam  Constitutional:       General: She is not in acute distress. HENT:      Head: Normocephalic and atraumatic. Mouth/Throat:      Pharynx: No oropharyngeal exudate. Eyes:      General: No scleral icterus. Conjunctiva/sclera: Conjunctivae normal.   Neck:      Trachea: No tracheal deviation. Cardiovascular:      Rate and Rhythm: Normal rate. Heart sounds: Normal heart sounds. Pulmonary:      Effort: Pulmonary effort is normal.      Breath sounds: Rales present. Abdominal:      Palpations: Abdomen is soft. Tenderness:  There is no abdominal

## 2022-11-20 NOTE — DISCHARGE SUMMARY
Discharge Summary    Date:11/20/2022  Patient Name: Prisca Lyn     YOB: 1950     Age: 74 y.o. MRN: [id number]    Admit Date:11/19/2022    Discharge Date: 11/20/22  Discharged Condition: stable    Discharge Diagnoses   Principal Problem (Resolved):    Respiratory failure with hypoxia ([address]  Active Problems:    Chronic diastolic congestive heart failure ([address]    Primary pulmonary coccidioidomycosis ([address]    Pulmonary hypertension due to left heart disease (HCC)    Pulmonary nodule following infection by Coccidioides    Prolonged Q-T interval on ECG    Paroxysmal atrial fibrillation (HCC)    Cardiomyopathy [facility]    Hyperlipidemia    Hypertension    Pulmonary emphysema ([facility]    Chronic obstructive pulmonary disease ([facility]    Shortness of breath  Resolved Problems:    COVID-19    Acute on chronic heart failure with preserved ejection fraction [facility])      Hospital Stay   Narrative of Hospital Course:  Prisca Lyn was admitted on 11/19/2022 with a PMHx of hypertrophic cardiomyopathy, chronic diastolic heart failure, paroxysmal atrial fibrillation anticoagulated with Eliquis 2.5 mg twice daily, CVA, lung adenocarcinoma in 2020 status postradiation, coccidiomycosis infection and COVID last month, COPD with recommendation for as needed home oxygen, hypertension, hyperlipidemia, and CKD who presented with shortness of breath. She was admitted with acute hypoxic respiratory failure. In the ED chest x-ray was consistent with mild CHF. She was treated with duo nebs and Solu-Medrol. Tested positive for COVID. Pulmonology, due to her extensive pulmonary history, and cardiology, per pulmonology recommendations, were consulted. Case management was also consulted. During her admission she was given supplemental oxygen. With the Lasix and steroids her symptoms greatly improved. On discharge she was given home oxygen.   She was discharged in stable condition and told to follow up with her PCP, cardiologist and pulmonologist.  She was told she may benefit from a long-acting bronchodilator outpatient. Consultants:   IP CONSULT TO FAMILY MEDICINE  IP CONSULT TO PULMONOLOGY  IP CONSULT TO CARDIOLOGY    Surgeries/Procedures Performed:       Treatments:   Duonebs, lasix, Klor-con solumedrol and prednisone     Significant Diagnostic Studies:   Recent Labs:  CBC:   Lab Results   Component Value Date/Time    WBC 7.3 11/20/2022 03:38 AM    RBC 3.67 11/20/2022 03:38 AM    HGB 10.9 11/20/2022 03:38 AM    HCT 35.4 11/20/2022 03:38 AM    MCV 96.5 11/20/2022 03:38 AM    MCH 29.7 11/20/2022 03:38 AM    MCHC 30.8 11/20/2022 03:38 AM    RDW 17.0 11/20/2022 03:38 AM     11/20/2022 03:38 AM     CMP:    Lab Results   Component Value Date/Time    GLUCOSE 132 11/20/2022 03:38 AM    GLUCOSE 89 02/15/2011 05:47 AM     11/20/2022 03:38 AM    K 4.3 11/20/2022 03:38 AM     11/20/2022 03:38 AM    CO2 20 11/20/2022 03:38 AM    BUN 26 11/20/2022 03:38 AM    CREATININE 1.4 11/20/2022 03:38 AM    ANIONGAP 13 11/20/2022 03:38 AM    ALKPHOS 103 11/20/2022 03:38 AM    ALT 31 11/20/2022 03:38 AM    AST 43 11/20/2022 03:38 AM    BILITOT 0.9 11/20/2022 03:38 AM    LABALBU 3.1 11/20/2022 03:38 AM    LABALBU 3.7 02/15/2011 05:47 AM    LABGLOM 39 11/20/2022 03:38 AM    GFRAA 36 04/05/2022 10:56 AM    PROT 6.0 11/20/2022 03:38 AM    CALCIUM 8.8 11/20/2022 03:38 AM       Radiology Last 7 Days:  XR CHEST PORTABLE    Result Date: 11/19/2022  Mild CHF. Discharge Physical Exam:   /78   Pulse (!) 112   Temp 98.2 °F (36.8 °C) (Oral)   Resp 18   Wt 124 lb 4 oz (56.4 kg)   SpO2 93%   BMI 20.05 kg/m²    Physical Exam   Constitutional:       Appearance: Normal appearance. She is normal weight. HENT:      Head: Normocephalic and atraumatic. Mouth/Throat:      Mouth: Mucous membranes are moist.      Pharynx: Oropharynx is clear. Eyes:      Extraocular Movements: Extraocular movements intact.       Pupils: Pupils are equal, round, and reactive to light. Cardiovascular:      Rate and Rhythm: Normal rate and regular rhythm. Pulses: Normal pulses. Heart sounds: No murmur heard. Pulmonary:      Effort: Pulmonary effort is normal.      Breath sounds: Wheezing present. Abdominal:      General: Abdomen is flat. Bowel sounds are normal.   Musculoskeletal:      Right lower leg: No edema. Left lower leg: No edema. Skin:     General: Skin is warm and dry. Coloration: Skin is not jaundiced. Neurological:      Mental Status: She is alert.    Discharge Plan   Disposition: Home    Provider Follow-Up:   [facility]  [address] [facility] 2  [address] [address]  Follow up      Fiona Escobedo [address] [address] [phone]    Follow up in 1 week(s)  Hosptial Follow-up       Hospital/Incidental Findings Requiring Follow-Up:  None    Patient Instructions   Diet: regular diet    Activity: activity as tolerated    Other Instructions:       Discharge Medications         Medication List        CONTINUE taking these medications      Handicap Placard Misc  by Does not apply route Duration: Lifetime     Nebulizer Mask Adult Misc  1 Units by Does not apply route 4 times daily as needed (dyspnea)     Portable Compressor Nebulizer Misc  1 Units by Does not apply route 4 times daily            ASK your doctor about these medications      * albuterol sulfate  (90 Base) MCG/ACT inhaler  Commonly known as: Ventolin HFA  Inhale 2 puffs into the lungs 4 times daily as needed for Wheezing     * albuterol 2 MG/5ML syrup  Commonly known as: PROVENTIL;VENTOLIN  Take 5 mLs by mouth 3 times daily     amiodarone 200 MG tablet  Commonly known as: CORDARONE     atorvastatin 40 MG tablet  Commonly known as: LIPITOR     cyanocobalamin 500 MCG tablet  Take 1 tablet by mouth daily     Eliquis 2.5 MG Tabs tablet  Generic drug: apixaban     furosemide 40 MG tablet  Commonly known as: LASIX     metoprolol tartrate 25 MG tablet  Commonly known as: LOPRESSOR  Take 0.5 tablets by mouth 2 times daily     predniSONE 10 MG tablet  Commonly known as: DELTASONE  Take 3 tabs po qd for 5 days, then 2 tabs po qd for 5 days then 1 tab po qd for 5 days then stop.     triamcinolone 0.1 % cream  Commonly known as: KENALOG           * This list has 2 medication(s) that are the same as other medications prescribed for you. Read the directions carefully, and ask your doctor or other care provider to review them with you.                   Electronically signed by Trevor Conde MD on 11/20/22 at 2:47 PM EST

## 2022-11-20 NOTE — PROGRESS NOTES
[address]  Progress Note    Chief complaint :  Chief Complaint   Patient presents with    Shortness of Breath     A few weeks post covid, feeling sob, SpO2 has been in 70's to 80s all day        Subjective:    No overnight problems. Patient describes feeling well today. She is not short of breath while sitting. She is hoping to go home today. Tolerating diet. Past medical, surgical, family and social history were reviewed, non-contributory, and unchanged unless otherwise stated. Review of Systems   Constitutional:  Negative for chills and fever. HENT:  Negative for sneezing and sore throat. Respiratory:  Positive for wheezing. Negative for cough and shortness of breath. Cardiovascular:  Negative for chest pain and palpitations. Gastrointestinal:  Negative for constipation and diarrhea. Genitourinary:  Negative for difficulty urinating and dysuria. Musculoskeletal:  Negative for back pain and neck pain. Neurological:  Negative for dizziness and headaches. Psychiatric/Behavioral:  Negative for confusion. The patient is not nervous/anxious. Objective:  /70   Pulse (!) 106   Temp 98 °F (36.7 °C) (Oral)   Resp 20   Wt 124 lb 4 oz (56.4 kg)   SpO2 94%   BMI 20.05 kg/m²     Physical Exam  Constitutional:       Appearance: Normal appearance. She is normal weight. HENT:      Head: Normocephalic and atraumatic. Mouth/Throat:      Mouth: Mucous membranes are moist.      Pharynx: Oropharynx is clear. Eyes:      Extraocular Movements: Extraocular movements intact. Pupils: Pupils are equal, round, and reactive to light. Cardiovascular:      Rate and Rhythm: Normal rate and regular rhythm. Pulses: Normal pulses. Heart sounds: No murmur heard. Pulmonary:      Effort: Pulmonary effort is normal.      Breath sounds: Wheezing present. Abdominal:      General: Abdomen is flat.  Bowel sounds are normal.   Musculoskeletal: Right lower leg: No edema. Left lower leg: No edema. Skin:     General: Skin is warm and dry. Coloration: Skin is not jaundiced. Neurological:      Mental Status: She is alert.        Labs:  Recent Results (from the past 24 hour(s))   Comprehensive Metabolic Panel w/ Reflex to MG    Collection Time: 11/20/22  3:38 AM   Result Value Ref Range    Sodium 138 132 - 146 mmol/L    Potassium reflex Magnesium 4.3 3.5 - 5.0 mmol/L    Chloride 105 98 - 107 mmol/L    CO2 20 (L) 22 - 29 mmol/L    Anion Gap 13 7 - 16 mmol/L    Glucose 132 (H) 74 - 99 mg/dL    BUN 26 (H) 6 - 23 mg/dL    Creatinine 1.4 (H) 0.5 - 1.0 mg/dL    Est, Glom Filt Rate 39 >=60 mL/min/1.73    Calcium 8.8 8.6 - 10.2 mg/dL    Total Protein 6.0 (L) 6.4 - 8.3 g/dL    Albumin 3.1 (L) 3.5 - 5.2 g/dL    Total Bilirubin 0.9 0.0 - 1.2 mg/dL    Alkaline Phosphatase 103 35 - 104 U/L    ALT 31 0 - 32 U/L    AST 43 (H) 0 - 31 U/L   CBC with Auto Differential    Collection Time: 11/20/22  3:38 AM   Result Value Ref Range    WBC 7.3 4.5 - 11.5 E9/L    RBC 3.67 3.50 - 5.50 E12/L    Hemoglobin 10.9 (L) 11.5 - 15.5 g/dL    Hematocrit 35.4 34.0 - 48.0 %    MCV 96.5 80.0 - 99.9 fL    MCH 29.7 26.0 - 35.0 pg    MCHC 30.8 (L) 32.0 - 34.5 %    RDW 17.0 (H) 11.5 - 15.0 fL    Platelets 924 570 - 499 E9/L    MPV 9.6 7.0 - 12.0 fL    Neutrophils % 83.2 (H) 43.0 - 80.0 %    Immature Granulocytes % 1.2 0.0 - 5.0 %    Lymphocytes % 7.2 (L) 20.0 - 42.0 %    Monocytes % 8.3 2.0 - 12.0 %    Eosinophils % 0.0 0.0 - 6.0 %    Basophils % 0.1 0.0 - 2.0 %    Neutrophils Absolute 6.04 1.80 - 7.30 E9/L    Immature Granulocytes # 0.09 E9/L    Lymphocytes Absolute 0.52 (L) 1.50 - 4.00 E9/L    Monocytes Absolute 0.60 0.10 - 0.95 E9/L    Eosinophils Absolute 0.00 (L) 0.05 - 0.50 E9/L    Basophils Absolute 0.01 0.00 - 0.20 E9/L    Anisocytosis 1+     Polychromasia 1+     Poikilocytes 1+     [place] Cells 1+     Ovalocytes 1+        Radiology and other tests reviewed:  XR CHEST PORTABLE Final Result   Mild CHF. Assessment:  Active Hospital Problems    Diagnosis Date Noted    Respiratory failure with hypoxia ([address] [J96.91] 11/19/2022     Priority: Medium    COVID-19 [U07.1] 11/19/2022     Priority: Medium    Acute on chronic heart failure with preserved ejection fraction ([address] [I50.33] 11/19/2022     Priority: Medium    Prolonged Q-T interval on ECG [R94.31] 11/19/2022     Priority: Medium    Chronic diastolic congestive heart failure ([address] [I50.32] 10/18/2022     Priority: Medium    Primary pulmonary coccidioidomycosis ([address] [B38.2] 10/18/2022     Priority: Medium    Pulmonary hypertension due to left heart disease ([address] [I27.22] 03/13/2020     Priority: Medium    Pulmonary nodule following infection by Coccidioides [R91.1, B94.8] 02/24/2020     Priority: Medium    Chronic obstructive pulmonary disease ([address] [J44.9]     Shortness of breath [R06.02]     Pulmonary emphysema ([address] [J43.9] 09/22/2020    Paroxysmal atrial fibrillation ([address] [I48.0]     Cardiomyopathy ([address] [I42.9]     Hyperlipidemia [E78.5]     Hypertension [I10]        Plan:  Acute Hypoxic Respiratory Failure secondary to COVID 19 Infection and/or COPD  COVID positive 1 month ago and again today on 11/19. Received Duoneb and solumedrol 125mg once in ED. Hx of Lung Cancer and primary pulmonary coccidioidomycosis; follows with Dr. Mleisa Yost at [facility]. Current concern for COPD exacerbation given new cough and sputum production with increasing shortness of breath, however does have crackles at bilateral bases and was positive for COVID. Treat with prednisone 40 mg daily for 5 days, day 2  Unable to have azithromycin as patient's QTC is prolonged  DuoNebs every 4 hours  Daily CBC, CMP  Consult Pulmonology, appreciate input; May need long acting bronchodilator outpatient   Ambulatory pulse ox today      Possible CHF Exacerbation, cause/contributing factor of respiratory failure with hypoxia?   BNP 62285 last echo was in 2019 and showed severe LVH without outflow obstruction, EF 56%, akinetic basal inferior segment, severely dilated left atrium, mitral regurg, tricuspid regurg, pulmonic valve regurg. On Lasix 40 mg daily at home, without potassium supplementation. Had not missed any doses except today when she presented to the ED. Lasix 40 mg IV once, Klor-Con 40 mEq daily while on Lasix  Pulmonology recommends consulting cardiology given significant comorbidities, cardiology consulted     Paroxysmal Atrial Fibrillation  Anticoagulated with Eliquis 2.5mg twice daily. Amiodarone 200 mg daily per [facility]. Currently rate controlled  Continue amiodarone 200 mg daily     Hypokalemia  Potassium 3.4 in ED, not on potassium with her home lasix. Replete  Potassium 40 mEq daily  CMP daily     Elevated Troponin  Troponin 32 to 29 in ED. EKG with nonspecific ST-T changes. Monitor, currently denies CP     Anemia  Hemoglobin 11.4 in ED, baseline appears to be about 11.5-12.0. Daily CBC  Check iron studies  No signs of bleeding     CKD  Creatinine in ED 1.1, baseline creatinine about 1.7.   Daily CMP     Hypertension with mild hypotension in ED  Monitor blood pressures  Lopressor 12.5mg twice daily     HLD  Continue lipitor 40mg daily        Consultations Ordered:  IP CONSULT TO FAMILY MEDICINE  IP CONSULT TO PULMONOLOGY  IP CONSULT TO CARDIOLOGY     DVT ppx: Eliquis 2.5 mg twice daily  GI ppx: Protonix 40 mg daily  Diet: General  Code: Full    Flaquito Butterfield MD MD  Family Medicine Resident PGY-1  11/20/22   7:24 AM

## 2022-11-20 NOTE — CARE COORDINATION
Spoke with patient regarding new home oxygen order. She confirmed she does not have same at home. She does however indicate that she has a nebulizer pump at home from [employer] [facility] and would desire home oxygen to come from same agency. She defers provision of choice list at this time. Will await documentation of ambulatory pulse oximetry testing and can initiate referral to [facility] thereafter [phone]. ISELA Hogan RN  [facility] Case Management  [phone]    Referral called to [employer] [facility], spoke with Mitzi Anderson, then  Charlesalexandra Bird and finally manager Sharlene Bailon. Demos, order, testing narrative and H&P faxed to [phone] as directed.  will be en route with equipment for home going. Bedside nurse notified. Pilar Madera, MSN RN  [facility] Case Management  [phone]

## 2022-11-20 NOTE — PROGRESS NOTES
mL IntraVENous 2 times per day    ipratropium-albuterol  1 ampule Inhalation Q4H    spironolactone  12.5 mg Oral Daily    predniSONE  40 mg Oral Daily    pantoprazole  40 mg Oral QAM AC    potassium chloride  40 mEq Oral Daily with breakfast       IV Infusions (if any):   sodium chloride           PHYSICAL EXAM:     CONSTITUTIONAL:   /78   Pulse 98   Temp 98.2 °F (36.8 °C) (Oral)   Resp 18   Wt 124 lb 4 oz (56.4 kg)   SpO2 93%   BMI 20.05 kg/m²   Pulse  Av.4  Min: 93  Max: 995  Systolic (31OXA), MLZ:780 , Min:107 , DBV:084    Diastolic (90NFI), VJT:00, Min:59, Max:78        Patient was not seen in person due to COVID-19 status, to limit personal exposure and limit PPE utilization. Seen yesterday in person      IMPRESSION / RECOMMENDATIONS:       Respiratory failure with hypoxia (HCC) - Pulmonary following       Acute on Chronic HFpEF - Continue diuretics - Monitor Wts, I/os, BP, renal Fn       Paroxysmal atrial fibrillation (HCC) s/p Ablation around 2018; Continue Eliquis (adjust dose based on her Creatinine) and Amiodarone. Occ A Fib RVR; Increase Metoprolol to 25mg BID, Monitor BP/HR. Recent COVID-19 - Had +ve test this admission also       Hypertension - Controlled            Cardiology will see as needed    She will f/u with her Cardiologist, in [facility] [employer] after discharge.       Electronically signed by Cyndi Chapa MD on 2022 at 12:18 PM  [facility]) Cardiology

## 2022-11-21 ENCOUNTER — CARE COORDINATION (OUTPATIENT)
Dept: CARE COORDINATION | Age: 72
End: 2022-11-21

## 2022-11-21 DIAGNOSIS — J96.01 ACUTE RESPIRATORY FAILURE WITH HYPOXIA (HCC): Primary | ICD-10-CM

## 2022-11-21 PROCEDURE — 1111F DSCHRG MED/CURRENT MED MERGE: CPT | Performed by: FAMILY MEDICINE

## 2022-11-21 NOTE — CARE COORDINATION
[facility] Care Transitions Initial Follow Up Call    Call within 2 business days of discharge: Yes    Care Transition Nurse contacted the patient by telephone to perform post hospital discharge assessment. Verified name and  with patient as identifiers. Provided introduction to self, and explanation of the Care Transition Nurse role. Patient: Talia Rouse Patient : 1950   MRN: <[id number]>  Reason for Admission: -22 Acute Respiratory Failure with Hypoxia; Covid-19 Virus  Discharge Date: 22 RARS: Readmission Risk Score: 16.8      Last Discharge  Street       Date Complaint Diagnosis Description Type Department Provider    22 Shortness of Breath Acute respiratory failure with hypoxia ([facility] . .. ED to Hosp-Admission (Discharged) (ADMITTED) [facility] Gamaliel Gant MD; Alexander Mckinnon. .. Was this an external facility discharge? No Discharge Facility: [facility]    Challenges to be reviewed by the provider   Additional needs identified to be addressed with provider: No  none               Method of communication with provider: none. Spoke with Pt who reports continuous cough with Yellowish/white phlegm, fatigue, and weakness. Pt states that she is wearing O2 @ 2L NC PRN, sats 92% RA at rest and 86% on 2L with ambulation. Discussed safety issues with ambulating and showering precautions d/t oxygen tubing. Pt v/u. Pt states that she monitors weights daily. Denies edema. Pt encouraged to eat small nutritious meals and to drink plenty of fluids. Pt v/u. Pt continues with Steroid Therapy. Medication Review completed, 1111F order placed. CTN routed message High Priority to PCP [address] [place] [address] Staff to schedule 7 day Hospital F/U. Pt denies Transportation, Home, or Medication needs. CTN information given, will continue to follow. Care Transition Nurse reviewed discharge instructions, medical action plan, and red flags with patient who verbalized understanding.  The patient was given an opportunity to ask questions and does not have any further questions or concerns at this time. Were discharge instructions available to patient? Yes. Reviewed appropriate site of care based on symptoms and resources available to patient including: PCP  Specialist  Urgent care clinics  When to call [address] The patient agrees to contact the PCP office for questions related to their healthcare. Advance Care Planning:   Does patient have an Advance Directive: reviewed and current. Medication reconciliation was performed with patient, who verbalizes understanding of administration of home medications. Medications reviewed, 1111F entered: yes    Was patient discharged with a pulse oximeter? yes, discussed and confirmed pulse oximeter discharge instructions and when to notify provider or seek emergency care. Non-face-to-face services provided:  Scheduled appointment with PCP-routed PCP  staff requesting Hosp F/U  Obtained and reviewed discharge summary and/or continuity of care documents    Offered patient enrollment in the Remote Patient Monitoring (RPM) program for in-home monitoring: Patient declined. Care Transitions 24 Hour Call    Schedule Follow Up Appointment with PCP: Completed  Do you have a copy of your discharge instructions?: Yes  Do you have all of your prescriptions and are they filled?: Yes  Have you been contacted by a [address] [place] [address] Avenue?: No  Have you scheduled your follow up appointment?: No  Do you feel like you have everything you need to keep you well at home?: Yes  Care Transitions Interventions  No Identified Needs         Follow Up  Future Appointments   Date Time Provider Rita Roe   10/31/2023  9:45  [facility] Transition Nurse provided contact information. Plan for follow-up call in 5-7 days based on severity of symptoms and risk factors.   Plan for next call: symptom management-Covid s/s, COPD/CHF/AFIB s/s  self management-monitor pulse ox, daily weights, steroid therapy  follow-up appointment-PCP, Pulmonology, Cardiology  medication management-taking as prescribed, changes    Ileana Hill LPN

## 2022-11-29 ENCOUNTER — CARE COORDINATION (OUTPATIENT)
Dept: CARE COORDINATION | Age: 72
End: 2022-11-29

## 2022-11-29 NOTE — CARE COORDINATION
[facility] Care Transitions Follow Up Call    Care Transition Nurse contacted the patient by telephone to follow up after admission on 22. Verified name and  with patient as identifiers. Patient: Guillermina Gilliland  Patient : 1950   MRN: <[id number]>  Reason for Admission: -22 Acute Respiratory Failure with Hypoxia; Covid-19 Virus  Discharge Date: 22 RARS: Readmission Risk Score: 16.8      Needs to be reviewed by the provider   Additional needs identified to be addressed with provider: No  none             Method of communication with provider: none. Spoke with Pt who reports productive cough, fatigue, weakness, and exertional SOB. Pt wears O2 @ 2L NC PRN, sats 91-93%. Pt states that its hard to get a good reading sometimes d/t fingers being cold. Pt does not use her nebulizer, states that Incentive Spirometer use helps her better. Pt taking OTC Mucinex. Steroid Therapy completed. Pt has PCP Hosp F/U scheduled tomorrow and Pulmonology appt scheduled 22. Pt active with Pulmonary Rehab. Pt denies Transportation, Home, or Medication needs. CTN information given, will continue to follow. Addressed changes since last contact:  none  Discussed follow-up appointments. If no appointment was previously scheduled, appointment scheduling offered: Yes. Is follow up appointment scheduled within 7 days of discharge? No.    Follow Up  Future Appointments   Date Time Provider Rita Roe   2022  8:15 AM DO Iesha Pacheco [facility]   2022  8:30 AM PHYLLIS Ortiz - CNP [facility]   10/31/2023  9:45 AM DO Iesha English [facility]     Non-[facility] follow up appointment(s):     Care Transition Nurse reviewed discharge instructions, medical action plan, and red flags with patient and discussed any barriers to care and/or understanding of plan of care after discharge.  Discussed appropriate site of care based on symptoms and resources available to patient including: PCP  Specialist  When to call [address] The patient agrees to contact the PCP office for questions related to their healthcare. Advance Care Planning:   reviewed and current. Patients top risk factors for readmission: medical condition-Covid-19, COPD, CHF, HTN, AFIB and multiple health system providers  Interventions to address risk factors: Obtained and reviewed discharge summary and/or continuity of care documents    Offered patient enrollment in the Remote Patient Monitoring (RPM) program for in-home monitoring: Patient declined. Care Transitions Subsequent and Final Call    Schedule Follow Up Appointment with PCP: Completed  Subsequent and Final Calls  Do you have any ongoing symptoms?: Yes  Onset of Patient-reported symptoms: In the past 7 days  Patient-reported symptoms: Cough, Fatigue, Weakness, Shortness of Breath  Interventions for patient-reported symptoms: Notified PCP/Physician  Have your medications changed?: No  Do you have any questions related to your medications?: No  Do you currently have any active services?: No  Do you have any needs or concerns that I can assist you with?: No  Identified Barriers: None  Care Transitions Interventions  No Identified Needs  Other Interventions:             Care Transition Nurse provided contact information for future needs. Plan for follow-up call in 5-7 days based on severity of symptoms and risk factors.   Plan for next call: symptom management-cough, sob  self management-monitor pulse ox  follow-up appointment-PCP, Pulmonology  medication management-taking as prescribed, changes    Bala Archer LPN

## 2022-11-30 ENCOUNTER — OFFICE VISIT (OUTPATIENT)
Dept: FAMILY MEDICINE CLINIC | Age: 72
End: 2022-11-30

## 2022-11-30 VITALS
OXYGEN SATURATION: 99 % | DIASTOLIC BLOOD PRESSURE: 63 MMHG | WEIGHT: 122.8 LBS | SYSTOLIC BLOOD PRESSURE: 104 MMHG | TEMPERATURE: 97.3 F | HEIGHT: 67 IN | BODY MASS INDEX: 19.27 KG/M2 | RESPIRATION RATE: 19 BRPM | HEART RATE: 53 BPM

## 2022-11-30 DIAGNOSIS — Z09 HOSPITAL DISCHARGE FOLLOW-UP: ICD-10-CM

## 2022-11-30 DIAGNOSIS — E44.1 MILD PROTEIN-CALORIE MALNUTRITION (HCC): ICD-10-CM

## 2022-11-30 DIAGNOSIS — R79.89 ELEVATED SERUM CREATININE: ICD-10-CM

## 2022-11-30 DIAGNOSIS — Z12.31 ENCOUNTER FOR SCREENING MAMMOGRAM FOR MALIGNANT NEOPLASM OF BREAST: ICD-10-CM

## 2022-11-30 DIAGNOSIS — U07.1 COVID-19: Primary | ICD-10-CM

## 2022-11-30 DIAGNOSIS — J44.1 COPD EXACERBATION (HCC): ICD-10-CM

## 2022-11-30 PROBLEM — E46 PROTEIN CALORIE MALNUTRITION (HCC): Status: ACTIVE | Noted: 2022-11-30

## 2022-11-30 LAB
ANION GAP SERPL CALCULATED.3IONS-SCNC: 12 MMOL/L (ref 7–16)
BUN BLDV-MCNC: 20 MG/DL (ref 6–23)
CALCIUM SERPL-MCNC: 8.6 MG/DL (ref 8.6–10.2)
CHLORIDE BLD-SCNC: 100 MMOL/L (ref 98–107)
CO2: 24 MMOL/L (ref 22–29)
CREAT SERPL-MCNC: 1.3 MG/DL (ref 0.5–1)
GFR SERPL CREATININE-BSD FRML MDRD: 43 ML/MIN/1.73
GLUCOSE BLD-MCNC: 81 MG/DL (ref 74–99)
POTASSIUM SERPL-SCNC: 3.6 MMOL/L (ref 3.5–5)
SODIUM BLD-SCNC: 136 MMOL/L (ref 132–146)

## 2022-11-30 NOTE — PROGRESS NOTES
Post-Discharge Transitional Care Follow Up      Sera Javed   YOB: 1950    Date of Office Visit:  11/30/2022  Date of Hospital Admission: 11/19/22  Date of Hospital Discharge: 11/20/22  Readmission Risk Score (high >=14%. Medium >=10%):Readmission Risk Score: 16.8      Care management risk score Rising risk (score 2-5) and Complex Care (Scores >=6): No Risk Score On File     Non face to face  following discharge, date last encounter closed (first attempt may have been earlier): 11/21/2022     Call initiated 2 business days of discharge: Yes     COVID-19  COPD exacerbation ([facility] [address] [address]  Elevated serum creatinine  -     Basic Metabolic Panel; Future  Mild protein-calorie malnutrition ([facility] [address]  Encounter for screening mammogram for malignant neoplasm of breast  -     ANKUSH DIGITAL SCREEN BILATERAL PER PROTOCOL; Future  Hospital discharge follow-up  -     MT DISCHARGE MEDS RECONCILED W/ CURRENT OUTPATIENT MED LIST      Medical Decision Making: high complexity  No follow-ups on file. On this date 11/30/2022 I have spent 45 minutes reviewing previous notes, test results and face to face with the patient discussing the diagnosis and importance of compliance with the treatment plan as well as documenting on the day of the visit. Subjective:   HPI    Hospital Stay   Narrative of Hospital Course:  Amna Payne was admitted on 11/19/2022 with a PMHx of hypertrophic cardiomyopathy, chronic diastolic heart failure, paroxysmal atrial fibrillation anticoagulated with Eliquis 2.5 mg twice daily, CVA, lung adenocarcinoma in 2020 status postradiation, coccidiomycosis infection and COVID last month, COPD with recommendation for as needed home oxygen, hypertension, hyperlipidemia, and CKD who presented with shortness of breath. She was admitted with acute hypoxic respiratory failure. In the ED chest x-ray was consistent with mild CHF. She was treated with duo nebs and Solu-Medrol.   Tested positive for COVID. Pulmonology, due to her extensive pulmonary history, and cardiology, per pulmonology recommendations, were consulted. Case management was also consulted. During her admission she was given supplemental oxygen. With the Lasix and steroids her symptoms greatly improved. On discharge she was given home oxygen. She was discharged in stable condition and told to follow up with her PCP, cardiologist and pulmonologist.  She was told she may benefit from a long-acting bronchodilator outpatient. HPI:  Patient is here for a hospital follow up. States she was in the hospital on 11/19/22 for hypoxia, states she was given oxygen therapy to go home with. States a month prior she had COVID. States her pulmonologist states she may need to have O2 while she is out shopping. States she would like to discuss getting a portable O2. States she has edema in left ankle. States this has been ongoing for a couple of days. States she was given IV lasix in the hospital. States she was also put on a no salt diet while there. HM reviewed. Labs completed on 11/19/22 and 11/20/22. Inpatient course: Discharge summary reviewed- see chart.     Interval history/Current status: STABLE/improved    Patient Active Problem List   Diagnosis    Varicose veins of both lower extremities    Spider veins    PVD (peripheral vascular disease) with claudication (HCC)    Paroxysmal atrial fibrillation (HCC)    Cardiomyopathy ([address]    Hyperlipidemia    Hypertension    Primary osteoarthritis of right knee    Primary osteoarthritis of right hip    Pulmonary emphysema (HCC)    GI bleed    Acute kidney injury superimposed on CKD (HCC)    Bradycardia    Anemia due to blood loss, acute    Chronic obstructive pulmonary disease (HCC)    Shortness of breath    Abnormal findings on diagnostic imaging of other specified body structures    Abnormal CT scan, chest    Acute medial meniscal tear    Chronic diastolic congestive heart failure ([address]    Combined form of senile cataract    Hoarseness    Knee pain    Primary pulmonary coccidioidomycosis ([address]    Problem    Pulmonary hypertension due to left heart disease (HCC)    Pulmonary nodule following infection by Coccidioides    Thoracic lymphadenopathy    Weight loss, unintentional    Prolonged Q-T interval on ECG    Protein calorie malnutrition       Medications listed as ordered at the time of discharge from hospital     Medication List            Accurate as of November 30, 2022  4:27 PM. If you have any questions, ask your nurse or doctor. CONTINUE taking these medications      * albuterol sulfate  (90 Base) MCG/ACT inhaler  Commonly known as: Ventolin HFA  Inhale 2 puffs into the lungs 4 times daily as needed for Wheezing     * albuterol 2 MG/5ML syrup  Commonly known as: PROVENTIL;VENTOLIN  Take 5 mLs by mouth 3 times daily     amiodarone 200 MG tablet  Commonly known as: CORDARONE     atorvastatin 40 MG tablet  Commonly known as: LIPITOR     cyanocobalamin 500 MCG tablet  Take 1 tablet by mouth daily     Eliquis 2.5 MG Tabs tablet  Generic drug: apixaban     furosemide 40 MG tablet  Commonly known as: LASIX     Handicap Placard Misc  by Does not apply route Duration: Lifetime     metoprolol tartrate 25 MG tablet  Commonly known as: LOPRESSOR  Take 1 tablet by mouth 2 times daily     Nebulizer Mask Adult Misc  1 Units by Does not apply route 4 times daily as needed (dyspnea)     Portable Compressor Nebulizer Misc  1 Units by Does not apply route 4 times daily           * This list has 2 medication(s) that are the same as other medications prescribed for you. Read the directions carefully, and ask your doctor or other care provider to review them with you.                 STOP taking these medications      triamcinolone 0.1 % cream  Commonly known as: KENALOG  Stopped by: Luke Resendiz, DO               Medications marked \"taking\" at this time  Outpatient Medications Marked as Taking for the 11/30/22 encounter (Office Visit) with Marian Hidalgo, DO   Medication Sig Dispense Refill    metoprolol tartrate (LOPRESSOR) 25 MG tablet Take 1 tablet by mouth 2 times daily 60 tablet 0    albuterol (PROVENTIL;VENTOLIN) 2 MG/5ML syrup Take 5 mLs by mouth 3 times daily 450 mL 3    Respiratory Therapy Supplies (NEBULIZER MASK ADULT) MISC 1 Units by Does not apply route 4 times daily as needed (dyspnea) 1 each 3    Nebulizers (PORTABLE COMPRESSOR NEBULIZER) MISC 1 Units by Does not apply route 4 times daily 1 each 0    ELIQUIS 2.5 MG TABS tablet       vitamin B-12 500 MCG tablet Take 1 tablet by mouth daily 30 tablet 0    amiodarone (CORDARONE) 200 MG tablet Take 200 mg by mouth daily      albuterol sulfate HFA (VENTOLIN HFA) 108 (90 Base) MCG/ACT inhaler Inhale 2 puffs into the lungs 4 times daily as needed for Wheezing 3 Inhaler 1    furosemide (LASIX) 40 MG tablet TAKE ONE TABLET BY MOUTH EVERY DAY  3    atorvastatin (LIPITOR) 40 MG tablet Take 40 mg by mouth daily       Handicap Placard MISC by Does not apply route Duration: Lifetime 1 each 0        Medications patient taking as of now reconciled against medications ordered at time of hospital discharge: Yes    Review of Systems much improved. No CP, diaphoresis, SOB, palp, HA, visual issues. Objective:    /63   Pulse 53   Temp 97.3 °F (36.3 °C)   Resp 19   Ht 5' 6.5\" (1.689 m)   Wt 122 lb 12.8 oz (55.7 kg)   SpO2 99%   BMI 19.52 kg/m²   Physical Exam    Patient's past medical, surgical, social and/or family history reviewed, updated in chart, and are non-contributory (unless otherwise stated). Medications and allergies also reviewed and updated in chart.      Physical Exam  /63   Pulse 53   Temp 97.3 °F (36.3 °C)   Resp 19   Ht 5' 6.5\" (1.689 m)   Wt 122 lb 12.8 oz (55.7 kg)   SpO2 99%   BMI 19.52 kg/m²   Wt Readings from Last 3 Encounters:   11/30/22 122 lb 12.8 oz (55.7 kg)   11/20/22 124 lb 4 oz (56.4 kg)   10/25/22 123 lb 9.6 oz (56.1 kg)     Temp Readings from Last 3 Encounters:   11/30/22 97.3 °F (36.3 °C)   11/20/22 98.2 °F (36.8 °C) (Oral)   10/25/22 97.4 °F (36.3 °C)     BP Readings from Last 3 Encounters:   11/30/22 104/63   11/20/22 128/78   10/25/22 130/75     Pulse Readings from Last 3 Encounters:   11/30/22 53   11/20/22 (!) 112   10/25/22 (!) 44       General appearance: alert, well appearing, and in no distress, oriented to person, place, and time and normal appearing weight. CVS exam: normal rate, regular rhythm, normal S1, S2, no murmurs, rubs, clicks or gallops. Radial pulses 2+ bilateral.  PT/DP pulse 2+ bilat. No C/C/E    Chest: clear to auscultation, no wheezes, rales or rhonchi, symmetric air entry. Musculoskeletal: MS 5/5, DTR 2/4 x4 extremities, FROM F/E spine, no tenderness, obvious masses or deformities. Gait normal.     Abdomen: Soft, non-tender, non-distended, positive BS in all 4 quadrants    Extremities:Dorsalis pedis pulses palpated bilaterally, no clubbing, cyanosis, edema or erythema     SKIN: no lesions, jaundice, petechiae, pallor, cyanosis, ecchymosis    NEURO: gross motor exam normal by observation, gait normal      Assessment/Plan  Sergey Cool was seen today for follow-up from hospital.    Diagnoses and all orders for this visit:    COVID-19  COPD exacerbation ([address]  - To have follow-up tomorrow with pulmonology to discuss portable oxygen tank. Sats are stable today. Advised to use inhalers as prescribed    Elevated serum creatinine  -     Basic Metabolic Panel; Future    Mild protein-calorie malnutrition ([address]  - Stable. Encounter for screening mammogram for malignant neoplasm of breast  -     ANKUSH DIGITAL SCREEN BILATERAL PER PROTOCOL; Future    Hospital discharge follow-up  -     OR DISCHARGE MEDS RECONCILED W/ CURRENT OUTPATIENT MED LIST        No follow-ups on file.       Nu Resendiz DO      --Nu Resendiz DO

## 2022-12-07 ENCOUNTER — CARE COORDINATION (OUTPATIENT)
Dept: CARE COORDINATION | Age: 72
End: 2022-12-07

## 2022-12-07 NOTE — CARE COORDINATION
Care Transitions Outreach Attempt    Call within 2 business days of discharge: Yes   Attempted to reach patient for transitions of care follow up. Unable to reach patient. CTN left HIPAA compliant message requesting return call. CTN will attempt to reach again. PCP Hosp F/U completed 22    Pulmonology Hosp F/U completed 2022      Patient: Iron Carias Patient : 1950 MRN: <[id number]>    Last Discharge  [address]       Date Complaint Diagnosis Description Type Department Provider    22 Shortness of Breath Acute respiratory failure with hypoxia ([facility]) . .. ED to Hosp-Admission (Discharged) (ADMITTED) [facility]W Madi Romano MD; Savanah Roberts. .. Was this an external facility discharge? No Discharge Facility: [facility]    Noted following upcoming appointments from discharge chart review:   [address] follow up appointment(s):   Future Appointments   Date Time Provider Rita Roe   2023 10:20 AM PHYLLIS Jacob - CNP [facility]   10/31/2023  9:45 AM DO Iesha Savage [facility]     Non-[facility] follow up appointment(s):     [address] Transition Team  [phone]

## 2022-12-14 ENCOUNTER — CARE COORDINATION (OUTPATIENT)
Dept: CASE MANAGEMENT | Age: 72
End: 2022-12-14

## 2022-12-14 NOTE — CARE COORDINATION
[address] Care Transitions Follow Up Call    Care Transition Nurse contacted the patient by telephone to follow up after admission on 22. Verified name and  with patient as identifiers. Patient: Hailey Javed  Patient : 1950   MRN: [id number]  Reason for Admission: Acute respiratory failure with hypoxia  Discharge Date: 22 RARS: Readmission Risk Score: 16.8      Needs to be reviewed by the provider   Additional needs identified to be addressed with provider: No  none             Method of communication with provider: none. Spoke to Marcos Borjas for transitions follow up. Stated she is doing OK, has used Trelegy samples from [employer] and seems to work pretty well. Pt is awaiting approval from insurance for POC, has rental POC for an upcoming trip to [facility] [place]. Pt is using supplemental 02 at home, on 2-3 LPM of . Denies increased SOB. Stated she did have swelling in LE but has subsided with Lasix and elevation. Denies any needs or concerns. Addressed changes since last contact:  medications-has Trelegy samples, has rental for POC at this time  Discussed follow-up appointments. Follow Up  Future Appointments   Date Time Provider Rita Roe   2023 10:20 AM PHYLLIS Thornton - CNP [facility]   10/31/2023  9:45 AM Nu Hidalgo DO [facility]       Care Transition Nurse reviewed discharge instructions with patient and discussed any barriers to care and/or understanding of plan of care after discharge. Discussed appropriate site of care based on symptoms and resources available to patient including: PCP  Specialist  When to call [phone]. The patient agrees to contact the PCP office for questions related to their healthcare.      Patients top risk factors for readmission: medical condition-COPD, lung CA  Interventions to address risk factors: Obtained and reviewed discharge summary and/or continuity of care documents    Offered patient enrollment in the Remote Patient Monitoring (RPM) program for in-home monitoring: Patient declined. Care Transitions Subsequent and Final Call    Subsequent and Final Calls  Do you have any ongoing symptoms?: No  Have your medications changed?: Yes  Patient Reports: Trelegy samples  Do you have any questions related to your medications?: No  Do you currently have any active services?: No  Do you have any needs or concerns that I can assist you with?: No  Identified Barriers: None  Care Transitions Interventions  Other Interventions:             Care Transition Nurse provided contact information for future needs. Plan for follow-up call in 5-7 days based on severity of symptoms and risk factors. Plan for next call: symptom management-SOB? Trelegy samples-still using? Needs or concerns? Did she f/u with Cardiology? Pt leaving on trip to [facility].  Final call    Maggy Mae RN

## 2023-01-05 ENCOUNTER — APPOINTMENT (OUTPATIENT)
Dept: GENERAL RADIOLOGY | Age: 73
End: 2023-01-05
Payer: MEDICARE

## 2023-01-05 ENCOUNTER — TELEPHONE (OUTPATIENT)
Dept: FAMILY MEDICINE CLINIC | Age: 73
End: 2023-01-05

## 2023-01-05 ENCOUNTER — HOSPITAL ENCOUNTER (EMERGENCY)
Age: 73
Discharge: HOME | End: 2023-01-05
Payer: MEDICARE

## 2023-01-05 VITALS
HEART RATE: 76 BPM | OXYGEN SATURATION: 98 % | SYSTOLIC BLOOD PRESSURE: 124 MMHG | WEIGHT: 125 LBS | RESPIRATION RATE: 17 BRPM | TEMPERATURE: 98.6 F | HEIGHT: 67 IN | DIASTOLIC BLOOD PRESSURE: 64 MMHG | BODY MASS INDEX: 19.62 KG/M2

## 2023-01-05 DIAGNOSIS — Z51.89 VISIT FOR WOUND CHECK: Primary | ICD-10-CM

## 2023-01-05 DIAGNOSIS — R79.89 ELEVATED SERUM CREATININE: ICD-10-CM

## 2023-01-05 LAB
ALBUMIN SERPL-MCNC: 3.8 G/DL (ref 3.5–5.2)
ALP BLD-CCNC: 82 U/L (ref 35–104)
ALT SERPL-CCNC: 21 U/L (ref 0–32)
ANION GAP SERPL CALCULATED.3IONS-SCNC: 14 MMOL/L (ref 7–16)
AST SERPL-CCNC: 34 U/L (ref 0–31)
BASOPHILS ABSOLUTE: 0.06 E9/L (ref 0–0.2)
BASOPHILS RELATIVE PERCENT: 0.7 % (ref 0–2)
BILIRUB SERPL-MCNC: 1.4 MG/DL (ref 0–1.2)
BUN BLDV-MCNC: 31 MG/DL (ref 6–23)
CALCIUM SERPL-MCNC: 9.4 MG/DL (ref 8.6–10.2)
CHLORIDE BLD-SCNC: 102 MMOL/L (ref 98–107)
CO2: 22 MMOL/L (ref 22–29)
CREAT SERPL-MCNC: 1.7 MG/DL (ref 0.5–1)
EOSINOPHILS ABSOLUTE: 0.08 E9/L (ref 0.05–0.5)
EOSINOPHILS RELATIVE PERCENT: 1 % (ref 0–6)
GFR SERPL CREATININE-BSD FRML MDRD: 31 ML/MIN/1.73
GLUCOSE BLD-MCNC: 95 MG/DL (ref 74–99)
HCT VFR BLD CALC: 37.4 % (ref 34–48)
HEMOGLOBIN: 11.5 G/DL (ref 11.5–15.5)
IMMATURE GRANULOCYTES #: 0.06 E9/L
IMMATURE GRANULOCYTES %: 0.7 % (ref 0–5)
LYMPHOCYTES ABSOLUTE: 1.9 E9/L (ref 1.5–4)
LYMPHOCYTES RELATIVE PERCENT: 23.7 % (ref 20–42)
MCH RBC QN AUTO: 31.2 PG (ref 26–35)
MCHC RBC AUTO-ENTMCNC: 30.7 % (ref 32–34.5)
MCV RBC AUTO: 101.4 FL (ref 80–99.9)
MONOCYTES ABSOLUTE: 0.91 E9/L (ref 0.1–0.95)
MONOCYTES RELATIVE PERCENT: 11.3 % (ref 2–12)
NEUTROPHILS ABSOLUTE: 5.01 E9/L (ref 1.8–7.3)
NEUTROPHILS RELATIVE PERCENT: 62.6 % (ref 43–80)
PDW BLD-RTO: 17.1 FL (ref 11.5–15)
PLATELET # BLD: 299 E9/L (ref 130–450)
PMV BLD AUTO: 8.4 FL (ref 7–12)
POTASSIUM SERPL-SCNC: 4.1 MMOL/L (ref 3.5–5)
RBC # BLD: 3.69 E12/L (ref 3.5–5.5)
SODIUM BLD-SCNC: 138 MMOL/L (ref 132–146)
TOTAL PROTEIN: 7.7 G/DL (ref 6.4–8.3)
WBC # BLD: 8 E9/L (ref 4.5–11.5)

## 2023-01-05 PROCEDURE — 99284 EMERGENCY DEPT VISIT MOD MDM: CPT

## 2023-01-05 PROCEDURE — 6370000000 HC RX 637 (ALT 250 FOR IP): Performed by: NURSE PRACTITIONER

## 2023-01-05 PROCEDURE — 90471 IMMUNIZATION ADMIN: CPT

## 2023-01-05 PROCEDURE — 80053 COMPREHEN METABOLIC PANEL: CPT

## 2023-01-05 PROCEDURE — 90715 TDAP VACCINE 7 YRS/> IM: CPT

## 2023-01-05 PROCEDURE — 85025 COMPLETE CBC W/AUTO DIFF WBC: CPT

## 2023-01-05 PROCEDURE — 73590 X-RAY EXAM OF LOWER LEG: CPT

## 2023-01-05 PROCEDURE — 6360000002 HC RX W HCPCS

## 2023-01-05 PROCEDURE — 36415 COLL VENOUS BLD VENIPUNCTURE: CPT

## 2023-01-05 RX ORDER — DOXYCYCLINE HYCLATE 100 MG
100 TABLET ORAL 2 TIMES DAILY
Qty: 14 TABLET | Refills: 0 | Status: SHIPPED | OUTPATIENT
Start: 2023-01-05 | End: 2023-01-12

## 2023-01-05 RX ORDER — BACITRACIN ZINC 500 [USP'U]/G
OINTMENT TOPICAL ONCE
Status: COMPLETED | OUTPATIENT
Start: 2023-01-05 | End: 2023-01-05

## 2023-01-05 RX ADMIN — TETANUS TOXOID, REDUCED DIPHTHERIA TOXOID AND ACELLULAR PERTUSSIS VACCINE, ADSORBED 0.5 ML: 5; 2.5; 8; 8; 2.5 SUSPENSION INTRAMUSCULAR at 17:43

## 2023-01-05 RX ADMIN — BACITRACIN ZINC: 500 OINTMENT TOPICAL at 19:04

## 2023-01-05 ASSESSMENT — PAIN DESCRIPTION - DESCRIPTORS: DESCRIPTORS: THROBBING;TIGHTNESS

## 2023-01-05 ASSESSMENT — PAIN DESCRIPTION - FREQUENCY: FREQUENCY: CONTINUOUS

## 2023-01-05 ASSESSMENT — ENCOUNTER SYMPTOMS
RESPIRATORY NEGATIVE: 1
GASTROINTESTINAL NEGATIVE: 1
EYES NEGATIVE: 1

## 2023-01-05 ASSESSMENT — PAIN DESCRIPTION - LOCATION: LOCATION: LEG

## 2023-01-05 ASSESSMENT — PAIN - FUNCTIONAL ASSESSMENT: PAIN_FUNCTIONAL_ASSESSMENT: 0-10

## 2023-01-05 ASSESSMENT — PAIN DESCRIPTION - ORIENTATION: ORIENTATION: LEFT;LOWER

## 2023-01-05 ASSESSMENT — PAIN DESCRIPTION - ONSET: ONSET: GRADUAL

## 2023-01-05 ASSESSMENT — PAIN DESCRIPTION - PAIN TYPE: TYPE: ACUTE PAIN

## 2023-01-05 ASSESSMENT — PAIN SCALES - GENERAL: PAINLEVEL_OUTOF10: 5

## 2023-01-05 NOTE — TELEPHONE ENCOUNTER
She can use topical Neosporin. If it worsens over the weekend, it should be looked at.   But if she can wait, I will see her on Monday

## 2023-01-06 NOTE — TELEPHONE ENCOUNTER
PT called,  she stated that she had gone to urgent care as advice of a neighbor whom is a nurse. She will call if she has any issues or if it worsens.

## 2023-01-06 NOTE — ED PROVIDER NOTES
Independent FLAKO Visit. [address] ENCOUNTER        Pt Name: Jennifer Zeng  MRN: [id number]  [place] 1950  Date of evaluation: 1/5/2023  Provider: PHYLLIS Mena - [place]  PCP: Jaden Good DO  Note Started: 11:20 PM EST 1/5/23      FLAKO. I have evaluated this patient. My supervising physician was available for consultation. CHIEF COMPLAINT       Chief Complaint   Patient presents with    Wound Check     Hit left lower leg over a week ago. +Redness. HISTORY OF PRESENT ILLNESS: 1 or more Elements     History From: patient  Limitations to history : None    Jennifer Zeng is a 74 y.o. female who presents to the emergency permit for a left lower extremity wound that occurred 1 week ago after she cut her leg. She states that she has been healing but there has been some slight green drainage from the scab. Patient denies any numbness tingling or weakness she denies any fever or chills. Nursing Notes were all reviewed and agreed with or any disagreements were addressed in the HPI. REVIEW OF SYSTEMS :      Review of Systems   Constitutional: Negative. HENT: Negative. Eyes: Negative. Respiratory: Negative. Cardiovascular: Negative. Gastrointestinal: Negative. Positives and Pertinent negatives as per HPI.      SURGICAL HISTORY     Past Surgical History:   Procedure Laterality Date    BREAST SURGERY Left     yrs ago benign    BRONCHOSCOPY N/A 1/31/2020    BRONCHOSCOPY EBUS performed by Valeria Smith MD at [address] [place] [address] N/A 1/31/2020    BRONCHOSCOPY NAVIGATIONAL performed by Valeria Smith MD at [address] [place] [address]  1/31/2020    BRONCHOSCOPY ALVEOLAR LAVAGE performed by Valeria Smith MD at [address] [place] [address]  1/31/2020    BRONCHOSCOPY/TRANSBRONCHIAL NEEDLE BIOPSY performed by Valeria Smith MD at [address] [place] [address]  1/31/2020 BRONCHOSCOPY/TRANSBRONCHIAL NEEDLE BIOPSY ADDL LOBE performed by Laura Arana MD at [facility] ENDOSCOPY    CARDIAC SURGERY  2017    ablation [facility]    COLONOSCOPY      [place]     COLONOSCOPY N/A 2021    COLONOSCOPY DIAGNOSTIC performed by Bienvenido Almauger MD at [facility] ENDOSCOPY    TUBAL LIGATION      UPPER GASTROINTESTINAL ENDOSCOPY N/A 8/10/2021    EGD ESOPHAGOGASTRODUODENOSCOPY performed by Bienvenido Almaguer MD at [facility] ENDOSCOPY       CURRENTMEDICATIONS       Discharge Medication List as of 2023  6:49 PM        CONTINUE these medications which have NOT CHANGED    Details   metoprolol tartrate (LOPRESSOR) 25 MG tablet Take 1 tablet by mouth 2 times daily, Disp-60 tablet, R-0Normal      ELIQUIS 2.5 MG TABS tablet DAWHistorical Med      vitamin B-12 500 MCG tablet Take 1 tablet by mouth daily, Disp-30 tablet, R-0Normal      amiodarone (CORDARONE) 200 MG tablet Take 200 mg by mouth dailyHistorical Med      albuterol sulfate HFA (VENTOLIN HFA) 108 (90 Base) MCG/ACT inhaler Inhale 2 puffs into the lungs 4 times daily as needed for Wheezing, Disp-3 Inhaler, R-1Normal      furosemide (LASIX) 40 MG tablet TAKE ONE TABLET BY MOUTH EVERY DAY, R-3Historical Med      atorvastatin (LIPITOR) 40 MG tablet Take 40 mg by mouth daily Historical Med             ALLERGIES     Patient has no known allergies.    FAMILYHISTORY       Family History   Problem Relation Age of Onset    Breast Cancer Mother     Diabetes Father     Breast Cancer Maternal Aunt         SOCIAL HISTORY       Social History     Tobacco Use    Smoking status: Former     Packs/day: 0.50     Years: 40.00     Pack years: 20.00     Types: Cigarettes     Start date:      Quit date: 2011     Years since quittin.3    Smokeless tobacco: Former     Quit date: 2011    Tobacco comments:     Quit smoking 11 yrs.ago.   Vaping Use    Vaping Use: Never used    Passive vaping exposure: Yes   Substance Use Topics    Alcohol use: No    Drug use: No  SCREENINGS        Ivory Coma Scale  Eye Opening: Spontaneous  Best Verbal Response: Oriented  Best Motor Response: Obeys commands  Ivory Coma Scale Score: 15                CIWA Assessment  BP: 124/64  Heart Rate: 76           PHYSICAL EXAM  1 or more Elements     ED Triage Vitals   BP Temp Temp Source Heart Rate Resp SpO2 Height Weight   01/05/23 1613 01/05/23 1613 01/05/23 1613 01/05/23 1613 01/05/23 1613 01/05/23 1613 01/05/23 1653 01/05/23 1653   (!) 119/56 98.6 °F (37 °C) Oral 68 16 96 % 5' 6.5\" (1.689 m) 125 lb (56.7 kg)       Physical Exam  Vitals and nursing note reviewed. Constitutional:       Appearance: Normal appearance. HENT:      Head: Normocephalic and atraumatic. Nose: Nose normal.   Eyes:      Extraocular Movements: Extraocular movements intact. Pupils: Pupils are equal, round, and reactive to light. Cardiovascular:      Rate and Rhythm: Normal rate and regular rhythm. Pulmonary:      Effort: Pulmonary effort is normal.      Breath sounds: Normal breath sounds. Abdominal:      General: Abdomen is flat. Musculoskeletal:      Cervical back: Normal range of motion and neck supple. Skin:     General: Skin is warm. Findings: Erythema and wound present. Neurological:      Mental Status: She is alert.            DIAGNOSTIC RESULTS   LABS:    Labs Reviewed   CBC WITH AUTO DIFFERENTIAL - Abnormal; Notable for the following components:       Result Value    .4 (*)     MCHC 30.7 (*)     RDW 17.1 (*)     All other components within normal limits   COMPREHENSIVE METABOLIC PANEL - Abnormal; Notable for the following components:    BUN 31 (*)     Creatinine 1.7 (*)     Total Bilirubin 1.4 (*)     AST 34 (*)     All other components within normal limits      Latest Reference Range & Units 1/5/23 05:20   Sodium 132 - 146 mmol/L 138   Potassium 3.5 - 5.0 mmol/L 4.1   Chloride 98 - 107 mmol/L 102   CO2 22 - 29 mmol/L 22   BUN,BUNPL 6 - 23 mg/dL 31 (H)   Creatinine 0.5 - 1.0 mg/dL 1.7 (H)   Anion Gap 7 - 16 mmol/L 14   Est, Glom Filt Rate >=60 mL/min/1.73 31   Glucose, Random 74 - 99 mg/dL 95   CALCIUM, SERUM, 750220 8.6 - 10.2 mg/dL 9.4   Total Protein 6.4 - 8.3 g/dL 7.7   Albumin 3.5 - 5.2 g/dL 3.8   Alk Phos 35 - 104 U/L 82   ALT 0 - 32 U/L 21   AST 0 - 31 U/L 34 (H)   Bilirubin 0.0 - 1.2 mg/dL 1.4 (H)   WBC 4.5 - 11.5 E9/L 8.0   RBC 3.50 - 5.50 E12/L 3.69   Hemoglobin Quant 11.5 - 15.5 g/dL 11.5   Hematocrit 34.0 - 48.0 % 37.4   MCV 80.0 - 99.9 fL 101.4 (H)   MCH 26.0 - 35.0 pg 31.2   MCHC 32.0 - 34.5 % 30.7 (L)   MPV 7.0 - 12.0 fL 8.4   RDW 11.5 - 15.0 fL 17.1 (H)   Platelet Count 113 - 450 E9/L 299   Neutrophils % 43.0 - 80.0 % 62.6   Immature Granulocytes % 0.0 - 5.0 % 0.7   Lymphocyte % 20.0 - 42.0 % 23.7   Monocytes % 2.0 - 12.0 % 11.3   Eosinophils % 0.0 - 6.0 % 1.0   Basophils % 0.0 - 2.0 % 0.7   Neutrophils Absolute 1.80 - 7.30 E9/L 5.01   Immature Granulocytes # E9/L 0.06   Lymphocytes Absolute 1.50 - 4.00 E9/L 1.90   Monocytes Absolute 0.10 - 0.95 E9/L 0.91   Eosinophils Absolute 0.05 - 0.50 E9/L 0.08   Basophils Absolute 0.00 - 0.20 E9/L 0.06   (H): Data is abnormally high  (L): Data is abnormally low    When ordered only abnormal lab results are displayed. All other labs were within normal range or not returned as of this dictation. EKG: When ordered, EKG's are interpreted by the Emergency Department Physician in the absence of a cardiologist.  Please see their note for interpretation of EKG. RADIOLOGY:   Non-plain film images such as CT, Ultrasound and MRI are read by the radiologist. Plain radiographic images are visualized and preliminarily interpreted by the ED Provider with the below findings:        Interpretation per the Radiologist below, if available at the time of this note:    XR TIBIA FIBULA LEFT (2 VIEWS)   Final Result   Bony demineralization without acute fracture or dislocation.            XR TIBIA FIBULA LEFT (2 VIEWS)    Result Date: 1/5/2023  EXAMINATION: 2 XRAY VIEWS OF THE LEFT TIBIA AND FIBULA 1/5/2023 5:15 pm COMPARISON: None. HISTORY: ORDERING SYSTEM PROVIDED HISTORY: injury,wound TECHNOLOGIST PROVIDED HISTORY: Reason for exam:->injury,wound FINDINGS: AP and lateral views of the left tibia and fibula were obtained on 4 images. The bones are demineralized. This decreases the sensitivity of detecting nondisplaced fractures. However, there is no evidence of acute fracture or dislocation. There is chondrocalcinosis at the left knee. Bony demineralization without acute fracture or dislocation. No results found. PROCEDURES   Unless otherwise noted below, none     Procedures    CRITICAL CARE TIME (.cctime)   none    PAST MEDICAL HISTORY      has a past medical history of Atrial fibrillation ([address] Cancer ([address] (2020), Cardiomyopathy ([address] CVA (cerebral vascular accident) ([address] Hyperlipidemia, Hypertension, PVD (peripheral vascular disease) with claudication ([address] (9/7/2016), Skin cancer, basal cell, Spider veins (9/7/2016), and Varicose veins of both lower extremities (9/7/2016).      EMERGENCY DEPARTMENT COURSE and DIFFERENTIAL DIAGNOSIS/MDM:   Vitals:    Vitals:    01/05/23 1613 01/05/23 1653 01/05/23 1904   BP: (!) 119/56  124/64   Pulse: 68  76   Resp: 16  17   Temp: 98.6 °F (37 °C)     TempSrc: Oral     SpO2: 96%  98%   Weight:  125 lb (56.7 kg)    Height:  5' 6.5\" (1.689 m)        Patient was given the following medications:  Medications   tetanus-diphth-acell pertussis (BOOSTRIX) 5-2.5-18.5 LF-MCG/0.5 injection (0.5 mLs IntraMUSCular Given 1/5/23 1743)   bacitracin zinc ointment ( Topical Given by Other 1/5/23 1904)             [unfilled]    Chronic Conditions affecting care:    has a past medical history of Atrial fibrillation ([address] Cancer ([address] (2020), Cardiomyopathy ([address] [facility] vascular accident) [facility] Hyperlipidemia, Hypertension, PVD (peripheral vascular disease) with claudication ([facility] (9/7/2016), Skin cancer, basal cell, Spider veins (9/7/2016), and Varicose veins of both lower extremities (9/7/2016). CONSULTS: (Who and What was discussed)  None      Social Determinants : None    Records Reviewed (Source): none    CC/HPI Summary, DDx, ED Course, and Reassessment: Patient presents emergency department for acute onset of left lower extremity wound following an injury 1 week ago. Patient's differential diagnoses include wound infection, sepsis, fracture, retained foreign body to the lower extremity. Patient's labs were reviewed at this time. Patient states that she has a history of chronic renal insufficiency this is not new for her she knows that her levels are high she states that she just saw her doctor today and they were elevated. Patient states that she is feeling better during her time in the emergency department after wound care and bacitracin application. Patient was seen and evaluated by Dr. Bernie Leigh ED attending. States that the patient is stable for discharged home with outpatient follow-up. Disposition Considerations (tests considered but not done, Admit vs D/C, Shared Decision Making, Pt Expectation of Test or Tx.): Shared decision making was made with the patient at this time she states that she is going to go home follow-up with her primary care physician Dr. Bernie Leigh attending. Patient to be placed on doxycycline and Bactroban ointment should get a follow-up with her primary care physician for repeat wound checks. Patient at this time is stable for discharge home inpatient admission is not warranted at this time. Patient is not septic in appearance. Patient's WBCs are not elevated. She is afebrile. There is no wound drainage. Small scab. I am the Primary Clinician of Record. FINAL IMPRESSION      1. Visit for wound check    2.  Elevated serum creatinine          DISPOSITION/PLAN     DISPOSITION Decision To Discharge 01/05/2023 06:48:52 PM      PATIENT REFERRED TO:  [address] [place], [place] [id number]    Schedule an appointment as soon as possible for a visit in 2 days  for follow up      DISCHARGE MEDICATIONS:  Discharge Medication List as of 1/5/2023  6:49 PM        START taking these medications    Details   mupirocin (BACTROBAN) 2 % ointment Apply topically 3 times daily. , Disp-30 g, R-0, Normal      doxycycline hyclate (VIBRA-TABS) 100 MG tablet Take 1 tablet by mouth 2 times daily for 7 days, Disp-14 tablet, R-0Normal             DISCONTINUED MEDICATIONS:  Discharge Medication List as of 1/5/2023  6:49 PM        STOP taking these medications       albuterol (PROVENTIL;VENTOLIN) 2 MG/5ML syrup Comments:   Reason for Stopping:                      (Please note that portions of this note were completed with a voice recognition program.  Efforts were made to edit the dictations but occasionally words are mis-transcribed.)    PHYLLIS Livingston CNP (electronically signed)          PHYLLIS Wade CNP  01/05/23 8389

## 2023-02-07 ENCOUNTER — OFFICE VISIT (OUTPATIENT)
Dept: FAMILY MEDICINE CLINIC | Age: 73
End: 2023-02-07
Payer: MEDICARE

## 2023-02-07 VITALS
BODY MASS INDEX: 19.93 KG/M2 | WEIGHT: 124 LBS | RESPIRATION RATE: 18 BRPM | DIASTOLIC BLOOD PRESSURE: 68 MMHG | TEMPERATURE: 97.9 F | HEIGHT: 66 IN | SYSTOLIC BLOOD PRESSURE: 110 MMHG

## 2023-02-07 DIAGNOSIS — T14.8XXA ABRASION: Primary | ICD-10-CM

## 2023-02-07 PROCEDURE — G8427 DOCREV CUR MEDS BY ELIG CLIN: HCPCS | Performed by: FAMILY MEDICINE

## 2023-02-07 PROCEDURE — 1090F PRES/ABSN URINE INCON ASSESS: CPT | Performed by: FAMILY MEDICINE

## 2023-02-07 PROCEDURE — 99213 OFFICE O/P EST LOW 20 MIN: CPT | Performed by: FAMILY MEDICINE

## 2023-02-07 PROCEDURE — G8420 CALC BMI NORM PARAMETERS: HCPCS | Performed by: FAMILY MEDICINE

## 2023-02-07 PROCEDURE — 3074F SYST BP LT 130 MM HG: CPT | Performed by: FAMILY MEDICINE

## 2023-02-07 PROCEDURE — G8484 FLU IMMUNIZE NO ADMIN: HCPCS | Performed by: FAMILY MEDICINE

## 2023-02-07 PROCEDURE — G8400 PT W/DXA NO RESULTS DOC: HCPCS | Performed by: FAMILY MEDICINE

## 2023-02-07 PROCEDURE — 3017F COLORECTAL CA SCREEN DOC REV: CPT | Performed by: FAMILY MEDICINE

## 2023-02-07 PROCEDURE — 3078F DIAST BP <80 MM HG: CPT | Performed by: FAMILY MEDICINE

## 2023-02-07 PROCEDURE — 1123F ACP DISCUSS/DSCN MKR DOCD: CPT | Performed by: FAMILY MEDICINE

## 2023-02-07 PROCEDURE — 1036F TOBACCO NON-USER: CPT | Performed by: FAMILY MEDICINE

## 2023-02-07 SDOH — ECONOMIC STABILITY: FOOD INSECURITY: WITHIN THE PAST 12 MONTHS, THE FOOD YOU BOUGHT JUST DIDN'T LAST AND YOU DIDN'T HAVE MONEY TO GET MORE.: NEVER TRUE

## 2023-02-07 SDOH — ECONOMIC STABILITY: FOOD INSECURITY: WITHIN THE PAST 12 MONTHS, YOU WORRIED THAT YOUR FOOD WOULD RUN OUT BEFORE YOU GOT MONEY TO BUY MORE.: NEVER TRUE

## 2023-02-07 SDOH — ECONOMIC STABILITY: HOUSING INSECURITY
IN THE LAST 12 MONTHS, WAS THERE A TIME WHEN YOU DID NOT HAVE A STEADY PLACE TO SLEEP OR SLEPT IN A SHELTER (INCLUDING NOW)?: NO

## 2023-02-07 SDOH — ECONOMIC STABILITY: INCOME INSECURITY: HOW HARD IS IT FOR YOU TO PAY FOR THE VERY BASICS LIKE FOOD, HOUSING, MEDICAL CARE, AND HEATING?: NOT HARD AT ALL

## 2023-02-07 ASSESSMENT — PATIENT HEALTH QUESTIONNAIRE - PHQ9
SUM OF ALL RESPONSES TO PHQ QUESTIONS 1-9: 0
2. FEELING DOWN, DEPRESSED OR HOPELESS: 0
1. LITTLE INTEREST OR PLEASURE IN DOING THINGS: 0
SUM OF ALL RESPONSES TO PHQ QUESTIONS 1-9: 0
SUM OF ALL RESPONSES TO PHQ9 QUESTIONS 1 & 2: 0

## 2023-02-07 NOTE — PROGRESS NOTES
2/27/2023    Chief Complaint   Patient presents with    Wound Check    Foot Swelling     bilateral       HPI    Herb Han is a 74 y.o. patient that presents today for:    Left shin, burning and painful still, warm to the touch and seeping. Puts Bactroban on it still and finished the ABX at the beginning of January and was given Tetanus shot. Patient's past medical, surgical, social and/or family history reviewed, updated in chart, and are non-contributory (unless otherwise stated). Medications and allergies also reviewed and updated in chart. ROS negative unless otherwise specified    Physical Exam  Temp Readings from Last 3 Encounters:   02/07/23 97.9 °F (36.6 °C)   01/05/23 98.6 °F (37 °C) (Oral)   12/01/22 97.9 °F (36.6 °C) (Tympanic)     Wt Readings from Last 3 Encounters:   02/07/23 124 lb (56.2 kg)   01/05/23 125 lb (56.7 kg)   12/01/22 122 lb 6.4 oz (55.5 kg)     BP Readings from Last 3 Encounters:   02/07/23 110/68   01/05/23 124/64   12/01/22 100/60     Pulse Readings from Last 3 Encounters:   01/05/23 76   12/01/22 63   11/30/22 53       General appearance: alert, well appearing, and in no distress, oriented to person, place, and time and normal appearing weight. CVS exam: normal rate, regular rhythm, normal S1, S2, no murmurs, rubs, clicks or gallops. Radial pulses 2+ bilateral.  PT/DP pulse 2+ bilat. No C/C/E    Chest: clear to auscultation, no wheezes, rales or rhonchi, symmetric air entry.      Abdomen: Soft, non-tender, non-distended, positive BS in all 4 quadrants    Extremities:Dorsalis pedis pulses palpated bilaterally, no clubbing, cyanosis, edema or erythema,     SKIN: no lesions, jaundice, petechiae, pallor, cyanosis, ecchymosis    NEURO: gross motor exam normal by observation, gait normal    Mental status - alert, oriented to person, place, and time, normal mood, behavior, speech, dress, motor activity, and thought processes      Assessment/Plan  Jose Rafael Ingram was seen today for wound check and foot swelling. Diagnoses and all orders for this visit:    Abrasion  -     mupirocin (BACTROBAN) 2 % ointment; Apply topically 3 times daily. No follow-ups on file. Nu Resendiz, DO    Call or go to ED immediately if symptoms worsen or persist.    Educational materials and/or home exercises printed for patient's review and were included in patient instructions on his/her After Visit Summary and given to patient at the end of visit. Counseled regarding above diagnosis, including possible risks and complications,  especially if left uncontrolled. Counseled regarding the possible side effects, risks, benefits and alternatives to treatment; patient and/or guardian verbalizes understanding, agrees, feels comfortable with and wishes to proceed with above treatment plan. Advised patient to call with any new medication issues, and read all Rx info from pharmacy to assure aware of all possible risks and side effects of medication before taking. Reviewed age and gender appropriate health screening exams and vaccinations. Advised patient regarding importance of keeping up with recommended health maintenance and to schedule as soon as possible if overdue, as this is important in assessing for undiagnosed pathology, especially cancer, as well as protecting against potentially harmful/life threatening disease. Patient and/or guardian verbalizes understanding and agrees with above counseling, assessment and plan. All questions answered.

## 2023-06-01 ENCOUNTER — HOSPITAL ENCOUNTER (OUTPATIENT)
Dept: MAMMOGRAPHY | Age: 73
Discharge: HOME | End: 2023-06-03
Payer: MEDICARE

## 2023-06-01 DIAGNOSIS — Z12.31 ENCOUNTER FOR SCREENING MAMMOGRAM FOR MALIGNANT NEOPLASM OF BREAST: ICD-10-CM

## 2023-06-01 PROCEDURE — 77067 SCR MAMMO BI INCL CAD: CPT

## 2023-06-06 ENCOUNTER — TELEPHONE (OUTPATIENT)
Dept: GENERAL RADIOLOGY | Age: 73
End: 2023-06-06

## 2023-06-06 DIAGNOSIS — R92.8 ABNORMAL MAMMOGRAM: Primary | ICD-10-CM

## 2023-06-06 NOTE — TELEPHONE ENCOUNTER
Call to patient in reference to her mammogram performed on the [facility] on June 1, 2023. Instructed patient that the radiologist has recommended some additional breast imaging in order to make a final determination/result. Informed her that a Rx has been obtained by the ordering physician. Next, a  from [facility] will contact her to schedule the additional imaging study/studies. Verbalizes understanding and is agreeable to proceed.

## 2023-07-25 LAB — LV EF: 70 %

## 2023-08-23 ENCOUNTER — OFFICE VISIT (OUTPATIENT)
Dept: FAMILY MEDICINE CLINIC | Age: 73
End: 2023-08-23
Payer: MEDICARE

## 2023-08-23 VITALS
OXYGEN SATURATION: 89 % | HEIGHT: 66 IN | TEMPERATURE: 97.6 F | WEIGHT: 120.1 LBS | BODY MASS INDEX: 19.3 KG/M2 | SYSTOLIC BLOOD PRESSURE: 100 MMHG | DIASTOLIC BLOOD PRESSURE: 62 MMHG | HEART RATE: 54 BPM | RESPIRATION RATE: 18 BRPM

## 2023-08-23 DIAGNOSIS — R07.9 CHEST PAIN, UNSPECIFIED TYPE: Primary | ICD-10-CM

## 2023-08-23 PROCEDURE — G8427 DOCREV CUR MEDS BY ELIG CLIN: HCPCS | Performed by: FAMILY MEDICINE

## 2023-08-23 PROCEDURE — 3074F SYST BP LT 130 MM HG: CPT | Performed by: FAMILY MEDICINE

## 2023-08-23 PROCEDURE — 3078F DIAST BP <80 MM HG: CPT | Performed by: FAMILY MEDICINE

## 2023-08-23 PROCEDURE — 99215 OFFICE O/P EST HI 40 MIN: CPT | Performed by: FAMILY MEDICINE

## 2023-08-23 PROCEDURE — 93000 ELECTROCARDIOGRAM COMPLETE: CPT | Performed by: FAMILY MEDICINE

## 2023-08-23 PROCEDURE — G8400 PT W/DXA NO RESULTS DOC: HCPCS | Performed by: FAMILY MEDICINE

## 2023-08-23 PROCEDURE — G8420 CALC BMI NORM PARAMETERS: HCPCS | Performed by: FAMILY MEDICINE

## 2023-08-23 PROCEDURE — 3017F COLORECTAL CA SCREEN DOC REV: CPT | Performed by: FAMILY MEDICINE

## 2023-08-23 PROCEDURE — 1036F TOBACCO NON-USER: CPT | Performed by: FAMILY MEDICINE

## 2023-08-23 PROCEDURE — 1123F ACP DISCUSS/DSCN MKR DOCD: CPT | Performed by: FAMILY MEDICINE

## 2023-08-23 PROCEDURE — 1090F PRES/ABSN URINE INCON ASSESS: CPT | Performed by: FAMILY MEDICINE

## 2023-08-23 NOTE — PROGRESS NOTES
status - alert, oriented to person, place, and time, normal mood, behavior, speech, dress, motor activity, and thought processes      Assessment/Plan  Katherine Leach was seen today for pain, congestion and discuss medications. Diagnoses and all orders for this visit:    Chest pain, unspecified type  -     EKG 12 Lead - Clinic Performed  - Discussed that symptoms warrant further evaluation above what is available in the office setting. Refuse transfer to [address] [place] [address].  going to take her to F> message sent to her Cardiologist    Other orders  -     Handkittyp Rashad Gusman; by Does not apply route lifetime          No follow-ups on file. Nu Resendiz, DO    Call or go to ED immediately if symptoms worsen or persist.    Educational materials and/or home exercises printed for patient's review and were included in patient instructions on his/her After Visit Summary and given to patient at the end of visit. Counseled regarding above diagnosis, including possible risks and complications,  especially if left uncontrolled. Counseled regarding the possible side effects, risks, benefits and alternatives to treatment; patient and/or guardian verbalizes understanding, agrees, feels comfortable with and wishes to proceed with above treatment plan. Advised patient to call with any new medication issues, and read all Rx info from pharmacy to assure aware of all possible risks and side effects of medication before taking. Reviewed age and gender appropriate health screening exams and vaccinations. Advised patient regarding importance of keeping up with recommended health maintenance and to schedule as soon as possible if overdue, as this is important in assessing for undiagnosed pathology, especially cancer, as well as protecting against potentially harmful/life threatening disease. Patient and/or guardian verbalizes understanding and agrees with above counseling, assessment and plan.     All questions

## 2023-09-19 ENCOUNTER — TELEPHONE (OUTPATIENT)
Dept: FAMILY MEDICINE CLINIC | Age: 73
End: 2023-09-19

## 2023-09-19 NOTE — TELEPHONE ENCOUNTER
Pt called in stating she does not know if she should come in and see you or maybe get an ultrasound done because she is still having some chest pain. Cardiology ruled out issues with heart, but stated to her that they think it could be from her coughing. Stated she had 2 mammo's also. Please advise.

## 2023-09-25 ENCOUNTER — HOSPITAL ENCOUNTER (OUTPATIENT)
Age: 73
Discharge: HOME | End: 2023-09-25
Payer: MEDICARE

## 2023-09-25 ENCOUNTER — OFFICE VISIT (OUTPATIENT)
Dept: FAMILY MEDICINE CLINIC | Age: 73
End: 2023-09-25
Payer: MEDICARE

## 2023-09-25 ENCOUNTER — HOSPITAL ENCOUNTER (OUTPATIENT)
Age: 73
Discharge: HOME | End: 2023-09-27
Payer: MEDICARE

## 2023-09-25 ENCOUNTER — HOSPITAL ENCOUNTER (OUTPATIENT)
Dept: GENERAL RADIOLOGY | Age: 73
Discharge: HOME | End: 2023-09-27
Payer: MEDICARE

## 2023-09-25 VITALS
SYSTOLIC BLOOD PRESSURE: 102 MMHG | OXYGEN SATURATION: 94 % | HEART RATE: 71 BPM | RESPIRATION RATE: 16 BRPM | DIASTOLIC BLOOD PRESSURE: 70 MMHG | HEIGHT: 66 IN | BODY MASS INDEX: 18.82 KG/M2 | WEIGHT: 117.1 LBS | TEMPERATURE: 97.2 F

## 2023-09-25 DIAGNOSIS — Z23 NEED FOR INFLUENZA VACCINATION: ICD-10-CM

## 2023-09-25 DIAGNOSIS — R07.9 RIGHT-SIDED CHEST PAIN: ICD-10-CM

## 2023-09-25 DIAGNOSIS — R07.9 RIGHT-SIDED CHEST PAIN: Primary | ICD-10-CM

## 2023-09-25 PROCEDURE — G8400 PT W/DXA NO RESULTS DOC: HCPCS | Performed by: FAMILY MEDICINE

## 2023-09-25 PROCEDURE — 99213 OFFICE O/P EST LOW 20 MIN: CPT | Performed by: FAMILY MEDICINE

## 2023-09-25 PROCEDURE — 1123F ACP DISCUSS/DSCN MKR DOCD: CPT | Performed by: FAMILY MEDICINE

## 2023-09-25 PROCEDURE — 71101 X-RAY EXAM UNILAT RIBS/CHEST: CPT

## 2023-09-25 PROCEDURE — G8427 DOCREV CUR MEDS BY ELIG CLIN: HCPCS | Performed by: FAMILY MEDICINE

## 2023-09-25 PROCEDURE — G8420 CALC BMI NORM PARAMETERS: HCPCS | Performed by: FAMILY MEDICINE

## 2023-09-25 PROCEDURE — 3078F DIAST BP <80 MM HG: CPT | Performed by: FAMILY MEDICINE

## 2023-09-25 PROCEDURE — 3074F SYST BP LT 130 MM HG: CPT | Performed by: FAMILY MEDICINE

## 2023-09-25 PROCEDURE — 3017F COLORECTAL CA SCREEN DOC REV: CPT | Performed by: FAMILY MEDICINE

## 2023-09-25 PROCEDURE — G0008 ADMIN INFLUENZA VIRUS VAC: HCPCS | Performed by: FAMILY MEDICINE

## 2023-09-25 PROCEDURE — 90694 VACC AIIV4 NO PRSRV 0.5ML IM: CPT | Performed by: FAMILY MEDICINE

## 2023-09-25 PROCEDURE — 1090F PRES/ABSN URINE INCON ASSESS: CPT | Performed by: FAMILY MEDICINE

## 2023-09-25 PROCEDURE — 1036F TOBACCO NON-USER: CPT | Performed by: FAMILY MEDICINE

## 2023-11-01 ENCOUNTER — TELEPHONE (OUTPATIENT)
Dept: PHARMACY | Facility: CLINIC | Age: 73
End: 2023-11-01

## 2023-11-01 NOTE — TELEPHONE ENCOUNTER
POPULATION HEALTH CLINICAL PHARMACY: ADHERENCE REVIEW  Identified care gap per [employer]: fills at [facility]: Statin adherence    ASSESSMENT   [address] Records claims through 10/23/2023 (Prior Year [address] = not reported; YTD [address] = 80%; Potential Fail Date: 23): Atorvastatin 40mg last filled on 23 for 90 day supply. Next refill due: 23    Prescribed si tablet/capsule daily    Per Insurer Portal: same. RF available    Per [facility]: will get 90 day supply ready to  since past due. Lab Results   Component Value Date    CHOL 116 2022    TRIG 92 2022    HDL 43 2022    LDLCALC 55 2022     ALT   Date Value Ref Range Status   2023 21 0 - 32 U/L Final     AST   Date Value Ref Range Status   2023 34 (H) 0 - 31 U/L Final     The ASCVD Risk score (Regino DK, et al., 2019) failed to calculate for the following reasons: The valid total cholesterol range is 130 to 320 mg/dL     PLAN  Per insurer report, LIS-0 - co-pays are based on tiers and patient is subject to coverage gap. The following are interventions that have been identified:   Patient overdue refilling Atorvastatin 40mg and active on home medication list.   Atorvastatin 40mg being filled at [facility]    Attempting to reach patient to review. Left message asking for return call. [employer] message sent to patient. Last Visit: 23  Next Visit: 23      Radha Ortez CP.    [facility] free: [phone]     For Pharmacy Admin Tracking Only    Program: Kayli in place:  No  Recommendation Provided To: Pharmacy: 1  Intervention Detail: Refill(s) Provided  Intervention Accepted By: Pharmacy: 1  Gap Closed?: Yes   Time Spent (min): 15

## 2023-11-16 ENCOUNTER — OFFICE VISIT (OUTPATIENT)
Dept: FAMILY MEDICINE CLINIC | Age: 73
End: 2023-11-16
Payer: MEDICARE

## 2023-11-16 VITALS
DIASTOLIC BLOOD PRESSURE: 71 MMHG | HEIGHT: 66 IN | OXYGEN SATURATION: 97 % | RESPIRATION RATE: 16 BRPM | BODY MASS INDEX: 19.08 KG/M2 | HEART RATE: 78 BPM | WEIGHT: 118.7 LBS | SYSTOLIC BLOOD PRESSURE: 109 MMHG | TEMPERATURE: 97.3 F

## 2023-11-16 DIAGNOSIS — Z00.00 MEDICARE ANNUAL WELLNESS VISIT, SUBSEQUENT: Primary | ICD-10-CM

## 2023-11-16 PROCEDURE — 3074F SYST BP LT 130 MM HG: CPT | Performed by: FAMILY MEDICINE

## 2023-11-16 PROCEDURE — 1123F ACP DISCUSS/DSCN MKR DOCD: CPT | Performed by: FAMILY MEDICINE

## 2023-11-16 PROCEDURE — 3017F COLORECTAL CA SCREEN DOC REV: CPT | Performed by: FAMILY MEDICINE

## 2023-11-16 PROCEDURE — 3078F DIAST BP <80 MM HG: CPT | Performed by: FAMILY MEDICINE

## 2023-11-16 PROCEDURE — G8484 FLU IMMUNIZE NO ADMIN: HCPCS | Performed by: FAMILY MEDICINE

## 2023-11-16 PROCEDURE — G0439 PPPS, SUBSEQ VISIT: HCPCS | Performed by: FAMILY MEDICINE

## 2023-11-16 RX ORDER — FLUTICASONE FUROATE, UMECLIDINIUM BROMIDE AND VILANTEROL TRIFENATATE 100; 62.5; 25 UG/1; UG/1; UG/1
POWDER RESPIRATORY (INHALATION)
COMMUNITY
Start: 2023-10-25

## 2023-11-16 ASSESSMENT — PATIENT HEALTH QUESTIONNAIRE - PHQ9
SUM OF ALL RESPONSES TO PHQ QUESTIONS 1-9: 0
SUM OF ALL RESPONSES TO PHQ QUESTIONS 1-9: 0
2. FEELING DOWN, DEPRESSED OR HOPELESS: 0
SUM OF ALL RESPONSES TO PHQ9 QUESTIONS 1 & 2: 0
SUM OF ALL RESPONSES TO PHQ QUESTIONS 1-9: 0
SUM OF ALL RESPONSES TO PHQ QUESTIONS 1-9: 0
1. LITTLE INTEREST OR PLEASURE IN DOING THINGS: 0

## 2023-11-16 NOTE — PATIENT INSTRUCTIONS
performed today your provider may have ordered immunizations, labs, imaging, and/or referrals for you. A list of these orders (if applicable) as well as your Preventive Care list are included within your After Visit Summary for your review. Other Preventive Recommendations:    A preventive eye exam performed by an eye specialist is recommended every 1-2 years to screen for glaucoma; cataracts, macular degeneration, and other eye disorders. A preventive dental visit is recommended every 6 months. Try to get at least 150 minutes of exercise per week or 10,000 steps per day on a pedometer . Order or download the FREE \"Exercise & Physical Activity: Your Everyday Guide\" from The [employer]. Call 7-224.983.5346 or search [employer] on Aging online. You need 9945-9442 mg of calcium and 0136-3282 IU of vitamin D per day. It is possible to meet your calcium requirement with diet alone, but a vitamin D supplement is usually necessary to meet this goal.  When exposed to the sun, use a sunscreen that protects against both UVA and UVB radiation with an SPF of 30 or greater. Reapply every 2 to 3 hours or after sweating, drying off with a towel, or swimming. Always wear a seat belt when traveling in a car. Always wear a helmet when riding a bicycle or motorcycle.

## 2024-01-11 ENCOUNTER — TELEPHONE (OUTPATIENT)
Dept: FAMILY MEDICINE CLINIC | Age: 74
End: 2024-01-11

## 2024-01-11 NOTE — TELEPHONE ENCOUNTER
Nicki has some kind of upper respiratory issue going on right now-she did test for Covid and it came back negative but she wanted to know if you could send something in for her?

## 2024-01-12 ENCOUNTER — OFFICE VISIT (OUTPATIENT)
Dept: FAMILY MEDICINE CLINIC | Age: 74
End: 2024-01-12

## 2024-01-12 VITALS
DIASTOLIC BLOOD PRESSURE: 78 MMHG | HEART RATE: 75 BPM | TEMPERATURE: 97.3 F | OXYGEN SATURATION: 94 % | SYSTOLIC BLOOD PRESSURE: 102 MMHG | WEIGHT: 122.2 LBS | BODY MASS INDEX: 19.72 KG/M2

## 2024-01-12 DIAGNOSIS — R05.9 COUGH, UNSPECIFIED TYPE: ICD-10-CM

## 2024-01-12 DIAGNOSIS — R09.82 POSTNASAL DRIP: ICD-10-CM

## 2024-01-12 DIAGNOSIS — J06.9 ACUTE UPPER RESPIRATORY INFECTION, UNSPECIFIED: Primary | ICD-10-CM

## 2024-01-12 DIAGNOSIS — R09.81 NASAL CONGESTION: ICD-10-CM

## 2024-01-12 DIAGNOSIS — J01.90 ACUTE NON-RECURRENT SINUSITIS, UNSPECIFIED LOCATION: ICD-10-CM

## 2024-01-12 LAB — RSV ANTIGEN: NEGATIVE

## 2024-01-12 RX ORDER — DOXYCYCLINE HYCLATE 100 MG
100 TABLET ORAL 2 TIMES DAILY
Qty: 20 TABLET | Refills: 0 | Status: SHIPPED | OUTPATIENT
Start: 2024-01-12 | End: 2024-01-22

## 2024-01-12 NOTE — PROGRESS NOTES
24  Nicki Javed : 1950 Sex: female  Age 73 y.o.      Subjective:  Chief Complaint   Patient presents with    Cough     Started one week, has discomfort in chest from persistent cough, concern for rib injury or pneumonia, negative at home covid test    Shortness of Breath         HPI:   HPI  Nicki Javed , 73 y.o. female presents to [facility] care for evaluation of cough, congestion, drainage.  The patient started with the symptoms about a week ago.  The patient states that she felt that she may have strained her rib from coughing so much.  The patient is having some pain in the bilateral upper chest areas.  The patient was also concerned for the possibility of pneumonia.  She is bringing up some yellow phlegm.  The patient is not really having any fever, syncope.  No hemoptysis.  The patient did do a home COVID test that was negative.          ROS:   Unless otherwise stated in this report the patient's positive and negative responses for review of systems for constitutional, eyes, ENT, cardiovascular, respiratory, gastrointestinal, neurological, , musculoskeletal, and integument systems and related systems to the presenting problem are either stated in the history of present illness or were not pertinent or were negative for the symptoms and/or complaints related to the presenting medical problem.  Positives and pertinent negatives as per HPI.  All others reviewed and are negative.      PMH:     Past Medical History:   Diagnosis Date    Atrial fibrillation (HCC)     Dr. Cantrell     Cancer (HCC)     lung    Cardiomyopathy (HCC)     Dr. Cantrell     CVA (cerebral vascular accident) ([facility])         Hyperlipidemia     Hypertension     PVD (peripheral vascular disease) with claudication ([facility]) 2016    Skin cancer, basal cell     yrs ago    Spider veins 2016    Varicose veins of both lower extremities 2016       Past Surgical History:   Procedure Laterality Date    BREAST SURGERY

## 2024-07-11 ENCOUNTER — HOSPITAL ENCOUNTER (INPATIENT)
Age: 74
LOS: 2 days | Discharge: ANOTHER HOSPITAL | DRG: 308 | End: 2024-07-14
Attending: STUDENT IN AN ORGANIZED HEALTH CARE EDUCATION/TRAINING PROGRAM | Admitting: FAMILY MEDICINE
Payer: MEDICARE

## 2024-07-11 ENCOUNTER — APPOINTMENT (OUTPATIENT)
Dept: CT IMAGING | Age: 74
DRG: 308 | End: 2024-07-11
Payer: MEDICARE

## 2024-07-11 ENCOUNTER — APPOINTMENT (OUTPATIENT)
Dept: GENERAL RADIOLOGY | Age: 74
DRG: 308 | End: 2024-07-11
Payer: MEDICARE

## 2024-07-11 DIAGNOSIS — I50.9 CONGESTIVE HEART FAILURE, UNSPECIFIED HF CHRONICITY, UNSPECIFIED HEART FAILURE TYPE (HCC): ICD-10-CM

## 2024-07-11 DIAGNOSIS — I50.22 CHRONIC SYSTOLIC CONGESTIVE HEART FAILURE (HCC): ICD-10-CM

## 2024-07-11 DIAGNOSIS — R00.1 SYMPTOMATIC BRADYCARDIA: Primary | ICD-10-CM

## 2024-07-11 DIAGNOSIS — N17.9 AKI (ACUTE KIDNEY INJURY) (HCC): ICD-10-CM

## 2024-07-11 LAB
ALBUMIN SERPL-MCNC: 3.8 G/DL (ref 3.5–5.2)
ALP SERPL-CCNC: 174 U/L (ref 35–104)
ALT SERPL-CCNC: 28 U/L (ref 0–32)
ANION GAP SERPL CALCULATED.3IONS-SCNC: 16 MMOL/L (ref 7–16)
AST SERPL-CCNC: 51 U/L (ref 0–31)
BASOPHILS # BLD: 0.04 K/UL (ref 0–0.2)
BASOPHILS NFR BLD: 0 % (ref 0–2)
BILIRUB SERPL-MCNC: 1 MG/DL (ref 0–1.2)
BUN SERPL-MCNC: 34 MG/DL (ref 6–23)
CALCIUM SERPL-MCNC: 9 MG/DL (ref 8.6–10.2)
CHLORIDE SERPL-SCNC: 97 MMOL/L (ref 98–107)
CO2 SERPL-SCNC: 22 MMOL/L (ref 22–29)
CREAT SERPL-MCNC: 2.1 MG/DL (ref 0.5–1)
EOSINOPHIL # BLD: 0.03 K/UL (ref 0.05–0.5)
EOSINOPHILS RELATIVE PERCENT: 0 % (ref 0–6)
ERYTHROCYTE [DISTWIDTH] IN BLOOD BY AUTOMATED COUNT: 18.8 % (ref 11.5–15)
GFR, ESTIMATED: 24 ML/MIN/1.73M2
GLUCOSE SERPL-MCNC: 133 MG/DL (ref 74–99)
HCT VFR BLD AUTO: 33 % (ref 34–48)
HGB BLD-MCNC: 9.7 G/DL (ref 11.5–15.5)
IMM GRANULOCYTES # BLD AUTO: 0.11 K/UL (ref 0–0.58)
IMM GRANULOCYTES NFR BLD: 1 % (ref 0–5)
LYMPHOCYTES NFR BLD: 1.72 K/UL (ref 1.5–4)
LYMPHOCYTES RELATIVE PERCENT: 16 % (ref 20–42)
MAGNESIUM SERPL-MCNC: 2.5 MG/DL (ref 1.6–2.6)
MCH RBC QN AUTO: 30.3 PG (ref 26–35)
MCHC RBC AUTO-ENTMCNC: 29.4 G/DL (ref 32–34.5)
MCV RBC AUTO: 103.1 FL (ref 80–99.9)
MONOCYTES NFR BLD: 1.05 K/UL (ref 0.1–0.95)
MONOCYTES NFR BLD: 10 % (ref 2–12)
NEUTROPHILS NFR BLD: 73 % (ref 43–80)
NEUTS SEG NFR BLD: 8.16 K/UL (ref 1.8–7.3)
PLATELET # BLD AUTO: 471 K/UL (ref 130–450)
PMV BLD AUTO: 8.8 FL (ref 7–12)
POTASSIUM SERPL-SCNC: 4.8 MMOL/L (ref 3.5–5)
PROT SERPL-MCNC: 7.7 G/DL (ref 6.4–8.3)
RBC # BLD AUTO: 3.2 M/UL (ref 3.5–5.5)
SODIUM SERPL-SCNC: 135 MMOL/L (ref 132–146)
TROPONIN I SERPL HS-MCNC: 40 NG/L (ref 0–9)
TSH SERPL DL<=0.05 MIU/L-ACNC: 20.32 UIU/ML (ref 0.27–4.2)
WBC OTHER # BLD: 11.1 K/UL (ref 4.5–11.5)

## 2024-07-11 PROCEDURE — 96365 THER/PROPH/DIAG IV INF INIT: CPT

## 2024-07-11 PROCEDURE — 96366 THER/PROPH/DIAG IV INF ADDON: CPT

## 2024-07-11 PROCEDURE — 71045 X-RAY EXAM CHEST 1 VIEW: CPT

## 2024-07-11 PROCEDURE — 85025 COMPLETE CBC W/AUTO DIFF WBC: CPT

## 2024-07-11 PROCEDURE — 71275 CT ANGIOGRAPHY CHEST: CPT

## 2024-07-11 PROCEDURE — 99285 EMERGENCY DEPT VISIT HI MDM: CPT

## 2024-07-11 PROCEDURE — 96361 HYDRATE IV INFUSION ADD-ON: CPT

## 2024-07-11 PROCEDURE — 96375 TX/PRO/DX INJ NEW DRUG ADDON: CPT

## 2024-07-11 PROCEDURE — 80053 COMPREHEN METABOLIC PANEL: CPT

## 2024-07-11 PROCEDURE — 2580000003 HC RX 258: Performed by: STUDENT IN AN ORGANIZED HEALTH CARE EDUCATION/TRAINING PROGRAM

## 2024-07-11 PROCEDURE — 83880 ASSAY OF NATRIURETIC PEPTIDE: CPT

## 2024-07-11 PROCEDURE — 84484 ASSAY OF TROPONIN QUANT: CPT

## 2024-07-11 PROCEDURE — 36556 INSERT NON-TUNNEL CV CATH: CPT

## 2024-07-11 PROCEDURE — 93005 ELECTROCARDIOGRAM TRACING: CPT | Performed by: STUDENT IN AN ORGANIZED HEALTH CARE EDUCATION/TRAINING PROGRAM

## 2024-07-11 PROCEDURE — 06HM33Z INSERTION OF INFUSION DEVICE INTO RIGHT FEMORAL VEIN, PERCUTANEOUS APPROACH: ICD-10-PCS | Performed by: STUDENT IN AN ORGANIZED HEALTH CARE EDUCATION/TRAINING PROGRAM

## 2024-07-11 PROCEDURE — 93005 ELECTROCARDIOGRAM TRACING: CPT

## 2024-07-11 PROCEDURE — 83735 ASSAY OF MAGNESIUM: CPT

## 2024-07-11 PROCEDURE — 84439 ASSAY OF FREE THYROXINE: CPT

## 2024-07-11 PROCEDURE — 6360000002 HC RX W HCPCS

## 2024-07-11 PROCEDURE — 6360000004 HC RX CONTRAST MEDICATION: Performed by: RADIOLOGY

## 2024-07-11 PROCEDURE — 84443 ASSAY THYROID STIM HORMONE: CPT

## 2024-07-11 RX ORDER — ATROPINE SULFATE 0.1 MG/ML
1 INJECTION INTRAVENOUS ONCE
Status: COMPLETED | OUTPATIENT
Start: 2024-07-11 | End: 2024-07-11

## 2024-07-11 RX ORDER — ATROPINE SULFATE 0.1 MG/ML
1 INJECTION INTRAVENOUS ONCE
Status: DISCONTINUED | OUTPATIENT
Start: 2024-07-11 | End: 2024-07-11

## 2024-07-11 RX ORDER — 0.9 % SODIUM CHLORIDE 0.9 %
250 INTRAVENOUS SOLUTION INTRAVENOUS ONCE
Status: COMPLETED | OUTPATIENT
Start: 2024-07-11 | End: 2024-07-11

## 2024-07-11 RX ADMIN — SODIUM CHLORIDE 250 ML: 9 INJECTION, SOLUTION INTRAVENOUS at 23:00

## 2024-07-11 RX ADMIN — ATROPINE SULFATE INJECTION 1 MG: 0.1 INJECTION, SOLUTION INTRAVENOUS at 23:43

## 2024-07-11 RX ADMIN — IOPAMIDOL 75 ML: 755 INJECTION, SOLUTION INTRAVENOUS at 23:19

## 2024-07-12 PROBLEM — R00.1 SYMPTOMATIC BRADYCARDIA: Status: ACTIVE | Noted: 2024-07-12

## 2024-07-12 LAB
BNP SERPL-MCNC: ABNORMAL PG/ML (ref 0–450)
EKG ATRIAL RATE: 57 BPM
EKG ATRIAL RATE: 94 BPM
EKG P AXIS: 101 DEGREES
EKG P-R INTERVAL: 166 MS
EKG P-R INTERVAL: 208 MS
EKG Q-T INTERVAL: 442 MS
EKG Q-T INTERVAL: 490 MS
EKG QRS DURATION: 124 MS
EKG QRS DURATION: 98 MS
EKG QTC CALCULATION (BAZETT): 476 MS
EKG QTC CALCULATION (BAZETT): 552 MS
EKG R AXIS: -35 DEGREES
EKG R AXIS: -48 DEGREES
EKG T AXIS: 128 DEGREES
EKG T AXIS: 133 DEGREES
EKG VENTRICULAR RATE: 57 BPM
EKG VENTRICULAR RATE: 94 BPM
T4 FREE SERPL-MCNC: 1.1 NG/DL (ref 0.9–1.7)

## 2024-07-12 PROCEDURE — 2000000000 HC ICU R&B

## 2024-07-12 PROCEDURE — 6370000000 HC RX 637 (ALT 250 FOR IP)

## 2024-07-12 PROCEDURE — 6360000002 HC RX W HCPCS

## 2024-07-12 PROCEDURE — 87081 CULTURE SCREEN ONLY: CPT

## 2024-07-12 PROCEDURE — 99223 1ST HOSP IP/OBS HIGH 75: CPT | Performed by: INTERNAL MEDICINE

## 2024-07-12 PROCEDURE — 99291 CRITICAL CARE FIRST HOUR: CPT | Performed by: NURSE PRACTITIONER

## 2024-07-12 PROCEDURE — 2580000003 HC RX 258

## 2024-07-12 PROCEDURE — 84166 PROTEIN E-PHORESIS/URINE/CSF: CPT

## 2024-07-12 PROCEDURE — 93010 ELECTROCARDIOGRAM REPORT: CPT | Performed by: INTERNAL MEDICINE

## 2024-07-12 PROCEDURE — 84156 ASSAY OF PROTEIN URINE: CPT

## 2024-07-12 PROCEDURE — 93005 ELECTROCARDIOGRAM TRACING: CPT | Performed by: INTERNAL MEDICINE

## 2024-07-12 PROCEDURE — 6360000002 HC RX W HCPCS: Performed by: STUDENT IN AN ORGANIZED HEALTH CARE EDUCATION/TRAINING PROGRAM

## 2024-07-12 RX ORDER — AMIODARONE HYDROCHLORIDE 200 MG/1
200 TABLET ORAL EVERY MORNING
Status: DISCONTINUED | OUTPATIENT
Start: 2024-07-13 | End: 2024-07-14 | Stop reason: HOSPADM

## 2024-07-12 RX ORDER — POLYETHYLENE GLYCOL 3350 17 G/17G
17 POWDER, FOR SOLUTION ORAL DAILY PRN
Status: DISCONTINUED | OUTPATIENT
Start: 2024-07-12 | End: 2024-07-14 | Stop reason: HOSPADM

## 2024-07-12 RX ORDER — PANTOPRAZOLE SODIUM 40 MG/1
40 TABLET, DELAYED RELEASE ORAL
Status: DISCONTINUED | OUTPATIENT
Start: 2024-07-13 | End: 2024-07-14 | Stop reason: HOSPADM

## 2024-07-12 RX ORDER — DOPAMINE HYDROCHLORIDE 320 MG/100ML
1-20 INJECTION, SOLUTION INTRAVENOUS CONTINUOUS
Status: DISCONTINUED | OUTPATIENT
Start: 2024-07-12 | End: 2024-07-14 | Stop reason: HOSPADM

## 2024-07-12 RX ORDER — UREA 10 %
500 LOTION (ML) TOPICAL EVERY MORNING
COMMUNITY

## 2024-07-12 RX ORDER — TORSEMIDE 20 MG/1
20 TABLET ORAL EVERY MORNING
Status: DISCONTINUED | OUTPATIENT
Start: 2024-07-13 | End: 2024-07-14 | Stop reason: HOSPADM

## 2024-07-12 RX ORDER — ACETAMINOPHEN 650 MG/1
650 SUPPOSITORY RECTAL EVERY 6 HOURS PRN
Status: DISCONTINUED | OUTPATIENT
Start: 2024-07-12 | End: 2024-07-14 | Stop reason: HOSPADM

## 2024-07-12 RX ORDER — ONDANSETRON 4 MG/1
4 TABLET, ORALLY DISINTEGRATING ORAL EVERY 8 HOURS PRN
Status: DISCONTINUED | OUTPATIENT
Start: 2024-07-12 | End: 2024-07-14 | Stop reason: HOSPADM

## 2024-07-12 RX ORDER — SODIUM CHLORIDE 9 MG/ML
INJECTION, SOLUTION INTRAVENOUS PRN
Status: DISCONTINUED | OUTPATIENT
Start: 2024-07-12 | End: 2024-07-14 | Stop reason: HOSPADM

## 2024-07-12 RX ORDER — METOPROLOL SUCCINATE 100 MG/1
100 TABLET, EXTENDED RELEASE ORAL EVERY MORNING
Status: DISCONTINUED | OUTPATIENT
Start: 2024-07-13 | End: 2024-07-14 | Stop reason: HOSPADM

## 2024-07-12 RX ORDER — ONDANSETRON 2 MG/ML
4 INJECTION INTRAMUSCULAR; INTRAVENOUS EVERY 6 HOURS PRN
Status: DISCONTINUED | OUTPATIENT
Start: 2024-07-12 | End: 2024-07-14 | Stop reason: HOSPADM

## 2024-07-12 RX ORDER — METOPROLOL SUCCINATE 100 MG/1
100 TABLET, EXTENDED RELEASE ORAL EVERY MORNING
COMMUNITY
Start: 2024-07-03

## 2024-07-12 RX ORDER — SODIUM CHLORIDE 0.9 % (FLUSH) 0.9 %
5-40 SYRINGE (ML) INJECTION EVERY 12 HOURS SCHEDULED
Status: DISCONTINUED | OUTPATIENT
Start: 2024-07-12 | End: 2024-07-14 | Stop reason: HOSPADM

## 2024-07-12 RX ORDER — ACETAMINOPHEN 325 MG/1
650 TABLET ORAL EVERY 6 HOURS PRN
Status: DISCONTINUED | OUTPATIENT
Start: 2024-07-12 | End: 2024-07-14 | Stop reason: HOSPADM

## 2024-07-12 RX ORDER — ONDANSETRON 2 MG/ML
4 INJECTION INTRAMUSCULAR; INTRAVENOUS ONCE
Status: COMPLETED | OUTPATIENT
Start: 2024-07-12 | End: 2024-07-12

## 2024-07-12 RX ORDER — ATORVASTATIN CALCIUM 20 MG/1
20 TABLET, FILM COATED ORAL EVERY MORNING
Status: DISCONTINUED | OUTPATIENT
Start: 2024-07-13 | End: 2024-07-13

## 2024-07-12 RX ORDER — SPIRONOLACTONE 25 MG/1
25 TABLET ORAL EVERY MORNING
Status: DISCONTINUED | OUTPATIENT
Start: 2024-07-13 | End: 2024-07-14 | Stop reason: HOSPADM

## 2024-07-12 RX ORDER — SODIUM CHLORIDE 0.9 % (FLUSH) 0.9 %
5-40 SYRINGE (ML) INJECTION PRN
Status: DISCONTINUED | OUTPATIENT
Start: 2024-07-12 | End: 2024-07-14 | Stop reason: HOSPADM

## 2024-07-12 RX ORDER — TORSEMIDE 20 MG/1
20 TABLET ORAL EVERY MORNING
COMMUNITY
Start: 2024-07-03

## 2024-07-12 RX ADMIN — DOPAMINE HYDROCHLORIDE 5 MCG/KG/MIN: 320 INJECTION, SOLUTION INTRAVENOUS at 02:22

## 2024-07-12 RX ADMIN — APIXABAN 2.5 MG: 2.5 TABLET, FILM COATED ORAL at 22:32

## 2024-07-12 RX ADMIN — ONDANSETRON 4 MG: 2 INJECTION INTRAMUSCULAR; INTRAVENOUS at 03:39

## 2024-07-12 RX ADMIN — SODIUM CHLORIDE, PRESERVATIVE FREE 10 ML: 5 INJECTION INTRAVENOUS at 22:28

## 2024-07-12 RX ADMIN — DOPAMINE HYDROCHLORIDE 5 MCG/KG/MIN: 320 INJECTION, SOLUTION INTRAVENOUS at 02:31

## 2024-07-12 NOTE — H&P
[facility]  Resident History and Physical      CHIEF COMPLAINT:    Chief Complaint   Patient presents with    Shortness of Breath    Irregular Heart Beat     Low rate.  Started today        History of Present Illness:   Nicki Javed  is a 74 y.o. female patient of Nu Lopez DO  with a pertinent PMHx of atrial flutter, ecent pericardiocentesis, hypertrophic cardiomyopathy, COPD who presented to the ER from home with  and daughter with chief complaint of fatigued and low heart rate.    Patient mentioned that symptoms that started yesterday when she was out of her garden around 1 PM she started feeling fatigued and low heart rate, she is stated that she has a device at home that can measure her heart rate in the 30s, this associate it with dizziness.  She went inside her home and trying to rest, she used for a couple of hours her oxygen that she usually wears because of her COPD however she did not found any relief reason why she decided to come to the ER.    On the ER when initially placed on monitor heart rate was in the 90s appears to be sinus then heart rate abruptly dropped to the 30s, she was given atropine with no improvement and then started on dopamine infusion. Call was placed by ER to [facility] where she was accepted and is awaiting a bed for transfer.     ED course: Vitals were remarkable for HR:40, RR:18, BP:76/48 MAP:85. Labs were remarkable for.  CBC within normal limits, CMP with chloride 97, glucose 133, BUN 34, creatinine 2.1, EGFR of 24, troponin 40, TSH of 20.32 , Pro BNP 11, 871, heart rate 44, CXR was remarkable for mild right upper lobe platelike atelectasis, CTA of the chest with contrast with no evidence of pulmonary embolism, 7 mm spiculated nodule superior segment right lower lobe, diminished from the prior examination of January 28, 2020, suggesting a positive response to treatment, small right effusion and centrilobular emphysema. EKG was

## 2024-07-12 NOTE — ED PROVIDER NOTES
(cerebral vascular accident) (HCC), Hyperlipidemia, Hypertension, PVD (peripheral vascular disease) with claudication (HCC), Skin cancer, basal cell, Spider veins, and Varicose veins of both lower extremities.    Past Surgical History:  has a past surgical history that includes Colonoscopy; Tubal ligation; Cardiac surgery (2017); bronchoscopy (N/A, 1/31/2020); bronchoscopy (N/A, 1/31/2020); bronchoscopy (1/31/2020); bronchoscopy (1/31/2020); bronchoscopy (1/31/2020); Upper gastrointestinal endoscopy (N/A, 8/10/2021); Colonoscopy (N/A, 8/11/2021); and Breast surgery (Left).    Social History:  reports that she quit smoking about 12 years ago. Her smoking use included cigarettes. She started smoking about 53 years ago. She has a 20.3 pack-year smoking history. She quit smokeless tobacco use about 12 years ago. She reports that she does not drink alcohol and does not use drugs.    Family History: family history includes Breast Cancer in her maternal aunt and mother; Diabetes in her father.     The patient’s home medications have been reviewed.    Allergies: Patient has no known allergies.    REVIEW OF EXTERNAL NOTE :      Chart reviewed: On 6/14/2024 patient seen and evaluated for pericardial effusion.    Previous Echo reviewed by me:  Lab Results   Component Value Date    LVEF 70 07/25/2023        No results found for this or any previous visit.       Controlled Substance Monitored by me:   Acute and Chronic Pain Monitoring:        No data to display                   ---------------------------------------------------PHYSICAL EXAM--------------------------------------    Physical Exam  Vitals and nursing note reviewed.   Constitutional:       General: She is not in acute distress.     Appearance: She is well-developed. She is not ill-appearing.   HENT:      Head: Normocephalic and atraumatic.   Eyes:      General: No scleral icterus.        Right eye: No discharge.         Left eye: No discharge.   Neck:

## 2024-07-12 NOTE — ED NOTES
I have managed some portion of this patient's care and coordination of her admission and transfer, etc. Today, 7/12/2024. In summary, the patient has been accepted at [facility], is currently on a dopamine drip, and it was felt that she would be better served at ICU level care while she awaits a bed for [facility] which could be days.  She is currently been boarding in our emergency department for around 19 hours.  Previous attending did speak with cardiology who felt she could be admitted here in the meantime, though our intensivist did recommend that we speak with [facility] first given the availability of EP and patient's recent history of pericardiocentesis.  I spoke with the access center for [facility] about possibly admitting/transferring the patient there, and currently they are discharge dependent with patients waiting upwards of 2 days for an ICU bed there, so we have spoken with our intensivist here, Dr. Galindo, who ultimately has accepted the patient to be admitted to our ICU.  She advised that we titrate the patient's dopamine drip down to 2.5, which has been ordered.  I also spoke with the family medicine resident just now and discussed the case, the patient has been accepted and they will come and see her now.    Mihaela Dejesus DO  Emergency Medicine  4:51 PM       Mihaela Dejesus DO  07/12/24 9044

## 2024-07-12 NOTE — ED NOTES
ED to Inpatient Handoff Report    Notified tiny that electronic handoff available and patient ready for transport to room 404.    Safety Risks: None identified    Patient in Restraints: no    Constant Observer or Patient : no    Telemetry Monitoring Ordered: Yes          Order to transfer to unit without monitor: YES    Last MEWS: 2 Time completed: 1841    Deterioration Index: 47.11    Vitals:    07/12/24 1731 07/12/24 1818 07/12/24 1823 07/12/24 1837   BP: 106/74 (!) 69/49 (!) 82/61 112/74   Pulse: 50 (!) 41 53 53   Resp: 18 18 19 18   Temp:       TempSrc:       SpO2: 97% 95% 98% 97%   Weight:           Opportunity for questions and clarification was provided.

## 2024-07-13 ENCOUNTER — APPOINTMENT (OUTPATIENT)
Dept: ULTRASOUND IMAGING | Age: 74
DRG: 308 | End: 2024-07-13
Payer: MEDICARE

## 2024-07-13 LAB
ALBUMIN SERPL-MCNC: 3.4 G/DL (ref 3.5–5.2)
ALP SERPL-CCNC: 134 U/L (ref 35–104)
ALT SERPL-CCNC: 20 U/L (ref 0–32)
ANION GAP SERPL CALCULATED.3IONS-SCNC: 14 MMOL/L (ref 7–16)
AST SERPL-CCNC: 33 U/L (ref 0–31)
BASOPHILS # BLD: 0.03 K/UL (ref 0–0.2)
BASOPHILS NFR BLD: 0 % (ref 0–2)
BILIRUB SERPL-MCNC: 1.1 MG/DL (ref 0–1.2)
BILIRUB UR QL STRIP: NEGATIVE
BUN SERPL-MCNC: 42 MG/DL (ref 6–23)
CA-I BLD-SCNC: 1.16 MMOL/L (ref 1.15–1.33)
CALCIUM SERPL-MCNC: 9 MG/DL (ref 8.6–10.2)
CHLORIDE SERPL-SCNC: 95 MMOL/L (ref 98–107)
CHLORIDE UR-SCNC: <20 MMOL/L
CLARITY UR: CLEAR
CO2 SERPL-SCNC: 21 MMOL/L (ref 22–29)
COLOR UR: YELLOW
COMMENT: NORMAL
CREAT SERPL-MCNC: 2.1 MG/DL (ref 0.5–1)
CREAT UR-MCNC: 76.9 MG/DL (ref 29–226)
CREAT UR-MCNC: 78.9 MG/DL (ref 29–226)
EOSINOPHIL # BLD: 0.01 K/UL (ref 0.05–0.5)
EOSINOPHILS RELATIVE PERCENT: 0 % (ref 0–6)
ERYTHROCYTE [DISTWIDTH] IN BLOOD BY AUTOMATED COUNT: 18.6 % (ref 11.5–15)
GFR, ESTIMATED: 24 ML/MIN/1.73M2
GLUCOSE SERPL-MCNC: 95 MG/DL (ref 74–99)
GLUCOSE UR STRIP-MCNC: NEGATIVE MG/DL
HCT VFR BLD AUTO: 32.3 % (ref 34–48)
HGB BLD-MCNC: 9.8 G/DL (ref 11.5–15.5)
HGB UR QL STRIP.AUTO: NEGATIVE
IMM GRANULOCYTES # BLD AUTO: 0.11 K/UL (ref 0–0.58)
IMM GRANULOCYTES NFR BLD: 1 % (ref 0–5)
KETONES UR STRIP-MCNC: NEGATIVE MG/DL
LACTATE BLDV-SCNC: 1.2 MMOL/L (ref 0.5–2.2)
LEUKOCYTE ESTERASE UR QL STRIP: NEGATIVE
LYMPHOCYTES NFR BLD: 1.09 K/UL (ref 1.5–4)
LYMPHOCYTES RELATIVE PERCENT: 8 % (ref 20–42)
MAGNESIUM SERPL-MCNC: 2.6 MG/DL (ref 1.6–2.6)
MCH RBC QN AUTO: 30.2 PG (ref 26–35)
MCHC RBC AUTO-ENTMCNC: 30.3 G/DL (ref 32–34.5)
MCV RBC AUTO: 99.7 FL (ref 80–99.9)
MICROALBUMIN UR-MCNC: 68 MG/L (ref 0–19)
MICROALBUMIN/CREAT UR-RTO: 88 MCG/MG CREAT (ref 0–30)
MONOCYTES NFR BLD: 0.94 K/UL (ref 0.1–0.95)
MONOCYTES NFR BLD: 7 % (ref 2–12)
NEUTROPHILS NFR BLD: 84 % (ref 43–80)
NEUTS SEG NFR BLD: 11.42 K/UL (ref 1.8–7.3)
NITRITE UR QL STRIP: NEGATIVE
PH UR STRIP: 5.5 [PH] (ref 5–9)
PHOSPHATE SERPL-MCNC: 4.8 MG/DL (ref 2.5–4.5)
PLATELET # BLD AUTO: 489 K/UL (ref 130–450)
PMV BLD AUTO: 8.3 FL (ref 7–12)
POTASSIUM SERPL-SCNC: 4.3 MMOL/L (ref 3.5–5)
PROT SERPL-MCNC: 7.1 G/DL (ref 6.4–8.3)
PROT UR STRIP-MCNC: NEGATIVE MG/DL
RBC # BLD AUTO: 3.24 M/UL (ref 3.5–5.5)
SODIUM SERPL-SCNC: 130 MMOL/L (ref 132–146)
SODIUM UR-SCNC: <20 MMOL/L
SP GR UR STRIP: 1.02 (ref 1–1.03)
UROBILINOGEN UR STRIP-ACNC: 0.2 EU/DL (ref 0–1)
UUN UR-MCNC: 484 MG/DL (ref 800–1666)
WBC OTHER # BLD: 13.6 K/UL (ref 4.5–11.5)

## 2024-07-13 PROCEDURE — 6360000002 HC RX W HCPCS

## 2024-07-13 PROCEDURE — 6370000000 HC RX 637 (ALT 250 FOR IP): Performed by: INTERNAL MEDICINE

## 2024-07-13 PROCEDURE — 2580000003 HC RX 258: Performed by: INTERNAL MEDICINE

## 2024-07-13 PROCEDURE — 83735 ASSAY OF MAGNESIUM: CPT

## 2024-07-13 PROCEDURE — 86335 IMMUNFIX E-PHORSIS/URINE/CSF: CPT

## 2024-07-13 PROCEDURE — 82330 ASSAY OF CALCIUM: CPT

## 2024-07-13 PROCEDURE — 85025 COMPLETE CBC W/AUTO DIFF WBC: CPT

## 2024-07-13 PROCEDURE — 94664 DEMO&/EVAL PT USE INHALER: CPT

## 2024-07-13 PROCEDURE — 94640 AIRWAY INHALATION TREATMENT: CPT

## 2024-07-13 PROCEDURE — 6370000000 HC RX 637 (ALT 250 FOR IP): Performed by: NURSE PRACTITIONER

## 2024-07-13 PROCEDURE — 82436 ASSAY OF URINE CHLORIDE: CPT

## 2024-07-13 PROCEDURE — 76770 US EXAM ABDO BACK WALL COMP: CPT

## 2024-07-13 PROCEDURE — 84540 ASSAY OF URINE/UREA-N: CPT

## 2024-07-13 PROCEDURE — 2000000000 HC ICU R&B

## 2024-07-13 PROCEDURE — 99233 SBSQ HOSP IP/OBS HIGH 50: CPT | Performed by: INTERNAL MEDICINE

## 2024-07-13 PROCEDURE — 87086 URINE CULTURE/COLONY COUNT: CPT

## 2024-07-13 PROCEDURE — 81003 URINALYSIS AUTO W/O SCOPE: CPT

## 2024-07-13 PROCEDURE — 84100 ASSAY OF PHOSPHORUS: CPT

## 2024-07-13 PROCEDURE — 6360000002 HC RX W HCPCS: Performed by: INTERNAL MEDICINE

## 2024-07-13 PROCEDURE — 51701 INSERT BLADDER CATHETER: CPT

## 2024-07-13 PROCEDURE — 84156 ASSAY OF PROTEIN URINE: CPT

## 2024-07-13 PROCEDURE — 87077 CULTURE AEROBIC IDENTIFY: CPT

## 2024-07-13 PROCEDURE — 6370000000 HC RX 637 (ALT 250 FOR IP)

## 2024-07-13 PROCEDURE — 84300 ASSAY OF URINE SODIUM: CPT

## 2024-07-13 PROCEDURE — 99222 1ST HOSP IP/OBS MODERATE 55: CPT | Performed by: FAMILY MEDICINE

## 2024-07-13 PROCEDURE — 2580000003 HC RX 258

## 2024-07-13 PROCEDURE — 82043 UR ALBUMIN QUANTITATIVE: CPT

## 2024-07-13 PROCEDURE — 83605 ASSAY OF LACTIC ACID: CPT

## 2024-07-13 PROCEDURE — 80053 COMPREHEN METABOLIC PANEL: CPT

## 2024-07-13 PROCEDURE — 51798 US URINE CAPACITY MEASURE: CPT

## 2024-07-13 PROCEDURE — 82570 ASSAY OF URINE CREATININE: CPT

## 2024-07-13 PROCEDURE — 2700000000 HC OXYGEN THERAPY PER DAY

## 2024-07-13 RX ORDER — ARFORMOTEROL TARTRATE 15 UG/2ML
15 SOLUTION RESPIRATORY (INHALATION)
Status: DISCONTINUED | OUTPATIENT
Start: 2024-07-13 | End: 2024-07-14 | Stop reason: HOSPADM

## 2024-07-13 RX ORDER — ATORVASTATIN CALCIUM 20 MG/1
20 TABLET, FILM COATED ORAL NIGHTLY
Status: DISCONTINUED | OUTPATIENT
Start: 2024-07-13 | End: 2024-07-14 | Stop reason: HOSPADM

## 2024-07-13 RX ORDER — IPRATROPIUM BROMIDE AND ALBUTEROL SULFATE 2.5; .5 MG/3ML; MG/3ML
1 SOLUTION RESPIRATORY (INHALATION)
Status: DISCONTINUED | OUTPATIENT
Start: 2024-07-13 | End: 2024-07-14 | Stop reason: HOSPADM

## 2024-07-13 RX ORDER — BUDESONIDE 0.5 MG/2ML
500 INHALANT ORAL
Status: DISCONTINUED | OUTPATIENT
Start: 2024-07-13 | End: 2024-07-14 | Stop reason: HOSPADM

## 2024-07-13 RX ORDER — SODIUM CHLORIDE, SODIUM LACTATE, POTASSIUM CHLORIDE, CALCIUM CHLORIDE 600; 310; 30; 20 MG/100ML; MG/100ML; MG/100ML; MG/100ML
INJECTION, SOLUTION INTRAVENOUS CONTINUOUS
Status: DISCONTINUED | OUTPATIENT
Start: 2024-07-13 | End: 2024-07-14

## 2024-07-13 RX ADMIN — IPRATROPIUM BROMIDE AND ALBUTEROL SULFATE 1 DOSE: 2.5; .5 SOLUTION RESPIRATORY (INHALATION) at 15:39

## 2024-07-13 RX ADMIN — APIXABAN 2.5 MG: 2.5 TABLET, FILM COATED ORAL at 08:47

## 2024-07-13 RX ADMIN — PANTOPRAZOLE SODIUM 40 MG: 40 TABLET, DELAYED RELEASE ORAL at 06:27

## 2024-07-13 RX ADMIN — AMIODARONE HYDROCHLORIDE 200 MG: 200 TABLET ORAL at 08:47

## 2024-07-13 RX ADMIN — IPRATROPIUM BROMIDE AND ALBUTEROL SULFATE 1 DOSE: 2.5; .5 SOLUTION RESPIRATORY (INHALATION) at 19:26

## 2024-07-13 RX ADMIN — ARFORMOTEROL TARTRATE 15 MCG: 15 SOLUTION RESPIRATORY (INHALATION) at 11:46

## 2024-07-13 RX ADMIN — IPRATROPIUM BROMIDE AND ALBUTEROL SULFATE 1 DOSE: 2.5; .5 SOLUTION RESPIRATORY (INHALATION) at 11:46

## 2024-07-13 RX ADMIN — DOPAMINE HYDROCHLORIDE 2.5 MCG/KG/MIN: 320 INJECTION, SOLUTION INTRAVENOUS at 11:19

## 2024-07-13 RX ADMIN — ARFORMOTEROL TARTRATE 15 MCG: 15 SOLUTION RESPIRATORY (INHALATION) at 19:26

## 2024-07-13 RX ADMIN — ATORVASTATIN CALCIUM 20 MG: 20 TABLET, FILM COATED ORAL at 21:24

## 2024-07-13 RX ADMIN — SODIUM CHLORIDE, POTASSIUM CHLORIDE, SODIUM LACTATE AND CALCIUM CHLORIDE: 600; 310; 30; 20 INJECTION, SOLUTION INTRAVENOUS at 16:02

## 2024-07-13 RX ADMIN — SODIUM CHLORIDE, PRESERVATIVE FREE 10 ML: 5 INJECTION INTRAVENOUS at 21:24

## 2024-07-13 RX ADMIN — BUDESONIDE INHALATION 500 MCG: 0.5 SUSPENSION RESPIRATORY (INHALATION) at 11:47

## 2024-07-13 RX ADMIN — ONDANSETRON 4 MG: 2 INJECTION INTRAMUSCULAR; INTRAVENOUS at 18:09

## 2024-07-13 RX ADMIN — BUDESONIDE INHALATION 500 MCG: 0.5 SUSPENSION RESPIRATORY (INHALATION) at 19:26

## 2024-07-13 RX ADMIN — APIXABAN 2.5 MG: 2.5 TABLET, FILM COATED ORAL at 21:24

## 2024-07-13 RX ADMIN — ONDANSETRON 4 MG: 2 INJECTION INTRAMUSCULAR; INTRAVENOUS at 12:09

## 2024-07-13 ASSESSMENT — PAIN SCALES - GENERAL
PAINLEVEL_OUTOF10: 0

## 2024-07-13 NOTE — CONSULTS
BASOPCT 0 07/11/2024 10:21 PM    MONOSABS 1.05 07/11/2024 10:21 PM    LYMPHSABS 1.72 07/11/2024 10:21 PM    EOSABS 0.03 07/11/2024 10:21 PM    BASOSABS 0.04 07/11/2024 10:21 PM       BMP   Lab Results   Component Value Date/Time     07/11/2024 10:21 PM    K 4.8 07/11/2024 10:21 PM    K 4.3 11/20/2022 03:38 AM    CL 97 07/11/2024 10:21 PM    CO2 22 07/11/2024 10:21 PM    BUN 34 07/11/2024 10:21 PM    CREATININE 2.1 07/11/2024 10:21 PM    GLUCOSE 133 07/11/2024 10:21 PM    GLUCOSE 89 02/15/2011 05:47 AM    CALCIUM 9.0 07/11/2024 10:21 PM    MG 2.5 07/11/2024 10:21 PM    PHOS 2.8 02/15/2011 05:47 AM       LFTS  Lab Results   Component Value Date/Time    ALKPHOS 174 07/11/2024 10:21 PM    ALT 28 07/11/2024 10:21 PM    AST 51 07/11/2024 10:21 PM    BILITOT 1.0 07/11/2024 10:21 PM       INR  No results for input(s): \"PROTIME\", \"INR\" in the last 72 hours.    APTT  No results for input(s): \"APTT\" in the last 72 hours.    Lactic Acid  No results found for: \"LACTA\"     BNP   No results for input(s): \"BNP\" in the last 72 hours.     Cultures     No results for input(s): \"BC\" in the last 72 hours.  No results for input(s): \"BLOODCULT2\" in the last 72 hours.    No results for input(s): \"LABURIN\" in the last 72 hours.    Radiology   CT Scans  Impression:  CTA CHEST W CONTRAST  07/11/24 2332      1. No evidence of pulmonary embolism or acute pulmonary abnormality.  2. 7 mm spiculated nodule superior segment right lower lobe, diminished from the prior examination of January 28, 2020, suggesting a positive response to  treatment.  3. Small right effusion.  4. Centrilobular emphysema.       SYSTEMS ASSESSMENT    Neuro   # Hx embolic stroke  - No residual effects    Respiratory   # Hx COPD  - Emphysema noted on CT with moderate COPD per FEV1   - On trelegy  - Wears home oxygen but not consistently    # Pulmonary nodules   - [facility] with presence of pulmonary nodules: stable indeterminate   10 x 8 mm right lower lobe 
Value Date/Time    TSH 20.32 07/11/2024 10:21 PM     VITAMIN B12: No components found for: \"B12\"  FOLATE:    Lab Results   Component Value Date/Time    FOLATE 13.7 08/12/2021 02:50 AM     Iron Saturation:  No components found for: \"PERCENTFE\"  FERRITIN:    Lab Results   Component Value Date/Time    FERRITIN 71 08/10/2021 03:47 AM     LIPASE:  No results found for: \"LIPASE\"  Fibrinogen Level:  No components found for: \"FIB\"  Urine Toxicology:  No components found for: \"IAMMENTA\", \"IBARBIT\", \"IBENZO\", \"ICOCAINE\", \"IMARTHC\", \"IOPIATES\", \"IPHENCYC\"  24 Hour Urine for Protein:  No components found for: \"RAWUPRO\", \"UHRS3\", \"SINR12GK\", \"UTV3\"  24 Hour Urine for Creatinine Clearance:  No components found for: \"CREAT4\", \"UHRS10\", \"UTV10\"     Imaging:  CTA CHEST W CONTRAST [[id number]] Collected: 07/11/24 2349     Order Status: Completed Updated: 07/11/24 2355    Narrative:      EXAMINATION:  CTA OF THE CHEST 7/11/2024 11:20 pm    TECHNIQUE:  CTA of the chest was performed after the administration of intravenous  contrast.  Multiplanar reformatted images are provided for review.  MIP  images are provided for review. Automated exposure control, iterative  reconstruction, and/or weight based adjustment of the mA/kV was utilized to  reduce the radiation dose to as low as reasonably achievable.    COMPARISON:  January 28, 2020    HISTORY:  ORDERING SYSTEM PROVIDED HISTORY: bradycardia, hx of percardial effusion  TECHNOLOGIST PROVIDED HISTORY:  Reason for exam:->bradycardia, hx of percardial effusion  Additional Contrast?->1    FINDINGS:  Pulmonary Arteries: Pulmonary arteries are adequately opacified for  evaluation.  No evidence of intraluminal filling defect to suggest pulmonary  embolism.  Main pulmonary artery is normal in caliber.    Mediastinum: No evidence of mediastinal lymphadenopathy.  The heart and  pericardium demonstrate no acute abnormality.  Mild cardiomegaly with no  pericardial effusion.  There is no acute 
pericardial effusion.   There is no significant respiratory variation across the inflows or evidence of   right sided chamber collapse. Normal size IVC. Overall, there is no   echocardiographic evidence of tamponade physiology.     Stress test:    Regadenoson SPECT MPI 7/2023 CC  CONCLUSIONS:    1. SPECT Perfusion Study: Normal.    2. There is no scintigraphic evidence for inducible ischemia.    3. No evidence of scarred myocardium.    4. There is a moderate sized nontransmural fixed anterior defect   consistent with breast attenuation artifact    5. Left ventricle is small. The left ventricle systolic function is   normal.    6. This is a low risk scan.             Gated Stress FBP    LVEF % 70     Cardiac catheterization:     -------------------------------------------------------------------------------------------------------------------------------------------------------------  IMPRESSION:  Bradycardia ? Sinus node dysfunction  Hypotension due to above improved  Hypertrophic cardiomyopathy  Acute on chronic HFpEF proBNP 12,000  Paroxysmal atrial fibrillation and DCC 2021 x 2.  Prior ablation 2018.  Maintained on amiodarone.  Large pericardial effusion s/p pericardiocentesis 5/2024.  No recurrent effusion noted on CTA chest  LAISHA/CKD creatinine 2.1, recent creatinine 1.28 6/2024 ? due to bradycardia/hypotension  Anemia Hgb 9.7 with macrocytosis  Elevated TSH 20.32, with normal free T4 1.1  COPD  Non-small cell lung cancer XRT 2022  History of coccidioidomycosis  Hypertension  History of CVA    RECOMMENDATIONS:  Currently stable on dopamine infusion with improvement of blood pressure and appears to be perfusing well.    No immediate indication for transvenous pacing  Started on dopamine in ER continue  Hold metoprolol and amiodarone  Hold diuretics for now  Needs EP evaluation  Resume apixaban if no invasive procedures planned  Awaiting transfer to [facility]    Discussed with patient and  at the

## 2024-07-13 NOTE — INTERDISCIPLINARY ROUNDS
Intensive Care Daily Quality Rounding Checklist      ICU Team Members: Dr. Galindo, Bedside nurse, Charge nurse, Respiratory therapist    ICU Day #: NUMBER: 2    SOFA Score: 5    Intubation Date: N/A    Ventilator Day #: N/A    Central Line Insertion Date: July 12        Day #: NUMBER: 2        Indication: CVCIndication: Administration of vasopressors or vesicant medications     Arterial Line Insertion Date: N/A      Day #: N/A    Temporary Hemodialysis Catheter Insertion Date: N/A      Day # N/A    DVT Prophylaxis: Eliquis    GI Prophylaxis: Protonix    Nuñez Catheter Insertion Date: N/A--External        Day #: N/A      Indications: N/A      Continued need (if yes, reason documented and discussed with physician): N/A    Skin Issues/ Wounds and ordered treatment discussed on rounds: Generalized bruising, blanchable redness to coccyx    Goals/ Plans for the Day:  Monitor labs and vitals, treat/replace as needed.  Wean dopamine as able, hold amiodarone.  Wean O2 as able. Transfer to [facility] when bed available.    Reviewed plan and goals for day with patient and/or representative: Yes

## 2024-07-13 NOTE — PLAN OF CARE
Problem: Discharge Planning  Goal: Discharge to home or other facility with appropriate resources  7/13/2024 1301 by Ethel Evans RN  Outcome: Progressing  7/13/2024 0128 by Joanne Stewart RN  Outcome: Progressing  Flowsheets  Taken 7/12/2024 2200  Discharge to home or other facility with appropriate resources:   Identify barriers to discharge with patient and caregiver   Arrange for needed discharge resources and transportation as appropriate   Identify discharge learning needs (meds, wound care, etc)  Taken 7/12/2024 2145  Discharge to home or other facility with appropriate resources:   Arrange for needed discharge resources and transportation as appropriate   Identify barriers to discharge with patient and caregiver   Identify discharge learning needs (meds, wound care, etc)     Problem: Skin/Tissue Integrity  Goal: Absence of new skin breakdown  Description: 1.  Monitor for areas of redness and/or skin breakdown  2.  Assess vascular access sites hourly  3.  Every 4-6 hours minimum:  Change oxygen saturation probe site  4.  Every 4-6 hours:  If on nasal continuous positive airway pressure, respiratory therapy assess nares and determine need for appliance change or resting period.  7/13/2024 1301 by Ethel Evans, RN  Outcome: Progressing  7/13/2024 0128 by Joanne Stewart RN  Outcome: Progressing     Problem: Safety - Adult  Goal: Free from fall injury  7/13/2024 1301 by Ethel Evans RN  Outcome: Progressing  7/13/2024 0128 by Joanne Stewart RN  Outcome: Progressing     Problem: ABCDS Injury Assessment  Goal: Absence of physical injury  7/13/2024 1301 by Ethel Evans, RN  Outcome: Progressing  Flowsheets (Taken 7/13/2024 1200)  Absence of Physical Injury: Implement safety measures based on patient assessment  7/13/2024 0128 by Joanne Stewart RN  Outcome: Progressing     Problem: Pain  Goal: Verbalizes/displays adequate comfort level or baseline comfort level  Outcome: Progressing

## 2024-07-13 NOTE — PLAN OF CARE
Problem: Discharge Planning  Goal: Discharge to home or other facility with appropriate resources  Outcome: Progressing  Flowsheets  Taken 7/12/2024 2200  Discharge to home or other facility with appropriate resources:   Identify barriers to discharge with patient and caregiver   Arrange for needed discharge resources and transportation as appropriate   Identify discharge learning needs (meds, wound care, etc)  Taken 7/12/2024 2145  Discharge to home or other facility with appropriate resources:   Arrange for needed discharge resources and transportation as appropriate   Identify barriers to discharge with patient and caregiver   Identify discharge learning needs (meds, wound care, etc)     Problem: Skin/Tissue Integrity  Goal: Absence of new skin breakdown  Description: 1.  Monitor for areas of redness and/or skin breakdown  2.  Assess vascular access sites hourly  3.  Every 4-6 hours minimum:  Change oxygen saturation probe site  4.  Every 4-6 hours:  If on nasal continuous positive airway pressure, respiratory therapy assess nares and determine need for appliance change or resting period.  Outcome: Progressing     Problem: Safety - Adult  Goal: Free from fall injury  Outcome: Progressing     Problem: ABCDS Injury Assessment  Goal: Absence of physical injury  Outcome: Progressing

## 2024-07-14 ENCOUNTER — APPOINTMENT (OUTPATIENT)
Age: 74
DRG: 308 | End: 2024-07-14
Attending: INTERNAL MEDICINE
Payer: MEDICARE

## 2024-07-14 ENCOUNTER — APPOINTMENT (OUTPATIENT)
Dept: GENERAL RADIOLOGY | Age: 74
DRG: 308 | End: 2024-07-14
Payer: MEDICARE

## 2024-07-14 VITALS
HEART RATE: 57 BPM | TEMPERATURE: 98.6 F | SYSTOLIC BLOOD PRESSURE: 166 MMHG | DIASTOLIC BLOOD PRESSURE: 63 MMHG | BODY MASS INDEX: 19.1 KG/M2 | RESPIRATION RATE: 19 BRPM | WEIGHT: 118.83 LBS | OXYGEN SATURATION: 96 % | HEIGHT: 66 IN

## 2024-07-14 LAB
25(OH)D3 SERPL-MCNC: 29.4 NG/ML (ref 30–100)
ALBUMIN SERPL-MCNC: 3.4 G/DL (ref 3.5–5.2)
ALP SERPL-CCNC: 129 U/L (ref 35–104)
ALT SERPL-CCNC: 19 U/L (ref 0–32)
ANION GAP SERPL CALCULATED.3IONS-SCNC: 12 MMOL/L (ref 7–16)
AST SERPL-CCNC: 29 U/L (ref 0–31)
BASOPHILS # BLD: 0.01 K/UL (ref 0–0.2)
BASOPHILS NFR BLD: 0 % (ref 0–2)
BILIRUB SERPL-MCNC: 1 MG/DL (ref 0–1.2)
BNP SERPL-MCNC: ABNORMAL PG/ML (ref 0–450)
BUN SERPL-MCNC: 45 MG/DL (ref 6–23)
CALCIUM SERPL-MCNC: 8.6 MG/DL (ref 8.6–10.2)
CHLORIDE SERPL-SCNC: 97 MMOL/L (ref 98–107)
CO2 SERPL-SCNC: 23 MMOL/L (ref 22–29)
CREAT SERPL-MCNC: 2 MG/DL (ref 0.5–1)
ECHO AV AREA PEAK VELOCITY: 2.1 CM2
ECHO AV AREA VTI: 2.1 CM2
ECHO AV AREA/BSA PEAK VELOCITY: 1.3 CM2/M2
ECHO AV AREA/BSA VTI: 1.3 CM2/M2
ECHO AV MEAN GRADIENT: 3 MMHG
ECHO AV MEAN VELOCITY: 0.8 M/S
ECHO AV PEAK GRADIENT: 6 MMHG
ECHO AV PEAK VELOCITY: 1.2 M/S
ECHO AV VELOCITY RATIO: 0.75
ECHO AV VTI: 24.5 CM
ECHO BSA: 1.59 M2
ECHO EST RA PRESSURE: 8 MMHG
ECHO LA VOL A-L A2C: 79 ML (ref 22–52)
ECHO LA VOL A-L A4C: 65 ML (ref 22–52)
ECHO LA VOL MOD A2C: 75 ML (ref 22–52)
ECHO LA VOL MOD A4C: 58 ML (ref 22–52)
ECHO LA VOLUME AREA LENGTH: 73 ML
ECHO LA VOLUME INDEX A-L A2C: 49 ML/M2 (ref 16–34)
ECHO LA VOLUME INDEX A-L A4C: 41 ML/M2 (ref 16–34)
ECHO LA VOLUME INDEX AREA LENGTH: 46 ML/M2 (ref 16–34)
ECHO LA VOLUME INDEX MOD A2C: 47 ML/M2 (ref 16–34)
ECHO LA VOLUME INDEX MOD A4C: 36 ML/M2 (ref 16–34)
ECHO LV E' LATERAL VELOCITY: 8 CM/S
ECHO LV E' SEPTAL VELOCITY: 5 CM/S
ECHO LV EDV A2C: 35 ML
ECHO LV EDV A4C: 21 ML
ECHO LV EDV BP: 29 ML (ref 56–104)
ECHO LV EDV INDEX A4C: 13 ML/M2
ECHO LV EDV INDEX BP: 18 ML/M2
ECHO LV EDV NDEX A2C: 22 ML/M2
ECHO LV EJECTION FRACTION A2C: 65 %
ECHO LV EJECTION FRACTION A4C: 62 %
ECHO LV EJECTION FRACTION BIPLANE: 66 % (ref 55–100)
ECHO LV ESV A2C: 12 ML
ECHO LV ESV A4C: 8 ML
ECHO LV ESV BP: 10 ML (ref 19–49)
ECHO LV ESV INDEX A2C: 8 ML/M2
ECHO LV ESV INDEX A4C: 5 ML/M2
ECHO LV ESV INDEX BP: 6 ML/M2
ECHO LV FRACTIONAL SHORTENING: 37 % (ref 28–44)
ECHO LV INTERNAL DIMENSION DIASTOLE INDEX: 2.69 CM/M2
ECHO LV INTERNAL DIMENSION DIASTOLIC: 4.3 CM (ref 3.9–5.3)
ECHO LV INTERNAL DIMENSION SYSTOLIC INDEX: 1.69 CM/M2
ECHO LV INTERNAL DIMENSION SYSTOLIC: 2.7 CM
ECHO LV IVSD: 1.7 CM (ref 0.6–0.9)
ECHO LV IVSS: 1.9 CM
ECHO LV MASS 2D: 245 G (ref 67–162)
ECHO LV MASS INDEX 2D: 153.1 G/M2 (ref 43–95)
ECHO LV POSTERIOR WALL DIASTOLIC: 1.2 CM (ref 0.6–0.9)
ECHO LV POSTERIOR WALL SYSTOLIC: 1.3 CM
ECHO LV RELATIVE WALL THICKNESS RATIO: 0.56
ECHO LVOT AREA: 3.1 CM2
ECHO LVOT AV VTI INDEX: 0.71
ECHO LVOT DIAM: 2 CM
ECHO LVOT MEAN GRADIENT: 1 MMHG
ECHO LVOT PEAK GRADIENT: 3 MMHG
ECHO LVOT PEAK VELOCITY: 0.9 M/S
ECHO LVOT STROKE VOLUME INDEX: 34 ML/M2
ECHO LVOT SV: 54.3 ML
ECHO LVOT VTI: 17.3 CM
ECHO MV A VELOCITY: 0.33 M/S
ECHO MV E DECELERATION TIME (DT): 106.5 MS
ECHO MV E VELOCITY: 0.64 M/S
ECHO MV E/A RATIO: 1.94
ECHO MV E/E' LATERAL: 8
ECHO MV E/E' RATIO (AVERAGED): 10.4
ECHO MV E/E' SEPTAL: 12.8
ECHO MV EROA PISA: 0.2 CM2
ECHO MV REGURGITANT ALIASING (NYQUIST) VELOCITY: 34 CM/S
ECHO MV REGURGITANT RADIUS PISA: 0.61 CM
ECHO MV REGURGITANT VELOCITY PISA: 4.8 M/S
ECHO MV REGURGITANT VOLUME PISA: 27.61 ML
ECHO MV REGURGITANT VTIA: 166.8 CM
ECHO RIGHT VENTRICULAR SYSTOLIC PRESSURE (RVSP): 59 MMHG
ECHO RV INTERNAL DIMENSION: 3.1 CM
ECHO RV TAPSE: 1 CM (ref 1.7–?)
ECHO TV REGURGITANT MAX VELOCITY: 3.58 M/S
ECHO TV REGURGITANT PEAK GRADIENT: 51 MMHG
EOSINOPHIL # BLD: 0.04 K/UL (ref 0.05–0.5)
EOSINOPHILS RELATIVE PERCENT: 0 % (ref 0–6)
ERYTHROCYTE [DISTWIDTH] IN BLOOD BY AUTOMATED COUNT: 18.6 % (ref 11.5–15)
FERRITIN SERPL-MCNC: 291 NG/ML
FOLATE SERPL-MCNC: 6.8 NG/ML (ref 4.8–24.2)
GFR, ESTIMATED: 25 ML/MIN/1.73M2
GLUCOSE SERPL-MCNC: 103 MG/DL (ref 74–99)
HCT VFR BLD AUTO: 31.7 % (ref 34–48)
HGB BLD-MCNC: 9.6 G/DL (ref 11.5–15.5)
IMM GRANULOCYTES # BLD AUTO: 0.06 K/UL (ref 0–0.58)
IMM GRANULOCYTES NFR BLD: 1 % (ref 0–5)
IRON SATN MFR SERPL: 15 % (ref 15–50)
IRON SERPL-MCNC: 44 UG/DL (ref 37–145)
LACTATE BLDV-SCNC: 2.4 MMOL/L (ref 0.5–2.2)
LACTATE BLDV-SCNC: 2.9 MMOL/L (ref 0.5–2.2)
LYMPHOCYTES NFR BLD: 1.04 K/UL (ref 1.5–4)
LYMPHOCYTES RELATIVE PERCENT: 9 % (ref 20–42)
MAGNESIUM SERPL-MCNC: 2.6 MG/DL (ref 1.6–2.6)
MCH RBC QN AUTO: 30.5 PG (ref 26–35)
MCHC RBC AUTO-ENTMCNC: 30.3 G/DL (ref 32–34.5)
MCV RBC AUTO: 100.6 FL (ref 80–99.9)
MICROORGANISM SPEC CULT: NORMAL
MONOCYTES NFR BLD: 0.87 K/UL (ref 0.1–0.95)
MONOCYTES NFR BLD: 8 % (ref 2–12)
NEUTROPHILS NFR BLD: 82 % (ref 43–80)
NEUTS SEG NFR BLD: 9.27 K/UL (ref 1.8–7.3)
PHOSPHATE SERPL-MCNC: 3.7 MG/DL (ref 2.5–4.5)
PLATELET # BLD AUTO: 471 K/UL (ref 130–450)
PMV BLD AUTO: 8.4 FL (ref 7–12)
POTASSIUM SERPL-SCNC: 4.4 MMOL/L (ref 3.5–5)
PROT SERPL-MCNC: 7.1 G/DL (ref 6.4–8.3)
PTH-INTACT SERPL-MCNC: 73 PG/ML (ref 15–65)
RBC # BLD AUTO: 3.15 M/UL (ref 3.5–5.5)
SERVICE CMNT-IMP: NORMAL
SODIUM SERPL-SCNC: 132 MMOL/L (ref 132–146)
SPECIMEN DESCRIPTION: NORMAL
T3FREE SERPL-MCNC: 1.19 PG/ML (ref 2–4.4)
TIBC SERPL-MCNC: 291 UG/DL (ref 250–450)
VIT B12 SERPL-MCNC: 572 PG/ML (ref 211–946)
WBC OTHER # BLD: 11.3 K/UL (ref 4.5–11.5)

## 2024-07-14 PROCEDURE — 6370000000 HC RX 637 (ALT 250 FOR IP): Performed by: INTERNAL MEDICINE

## 2024-07-14 PROCEDURE — 82607 VITAMIN B-12: CPT

## 2024-07-14 PROCEDURE — 93005 ELECTROCARDIOGRAM TRACING: CPT

## 2024-07-14 PROCEDURE — 85025 COMPLETE CBC W/AUTO DIFF WBC: CPT

## 2024-07-14 PROCEDURE — 6370000000 HC RX 637 (ALT 250 FOR IP): Performed by: NURSE PRACTITIONER

## 2024-07-14 PROCEDURE — 36592 COLLECT BLOOD FROM PICC: CPT

## 2024-07-14 PROCEDURE — 83880 ASSAY OF NATRIURETIC PEPTIDE: CPT

## 2024-07-14 PROCEDURE — 82728 ASSAY OF FERRITIN: CPT

## 2024-07-14 PROCEDURE — 84155 ASSAY OF PROTEIN SERUM: CPT

## 2024-07-14 PROCEDURE — 99232 SBSQ HOSP IP/OBS MODERATE 35: CPT | Performed by: INTERNAL MEDICINE

## 2024-07-14 PROCEDURE — 6370000000 HC RX 637 (ALT 250 FOR IP)

## 2024-07-14 PROCEDURE — 2700000000 HC OXYGEN THERAPY PER DAY

## 2024-07-14 PROCEDURE — 83540 ASSAY OF IRON: CPT

## 2024-07-14 PROCEDURE — 93308 TTE F-UP OR LMTD: CPT

## 2024-07-14 PROCEDURE — 84481 FREE ASSAY (FT-3): CPT

## 2024-07-14 PROCEDURE — 93308 TTE F-UP OR LMTD: CPT | Performed by: INTERNAL MEDICINE

## 2024-07-14 PROCEDURE — 83735 ASSAY OF MAGNESIUM: CPT

## 2024-07-14 PROCEDURE — 93321 DOPPLER ECHO F-UP/LMTD STD: CPT | Performed by: INTERNAL MEDICINE

## 2024-07-14 PROCEDURE — 71045 X-RAY EXAM CHEST 1 VIEW: CPT

## 2024-07-14 PROCEDURE — 83970 ASSAY OF PARATHORMONE: CPT

## 2024-07-14 PROCEDURE — 93325 DOPPLER ECHO COLOR FLOW MAPG: CPT | Performed by: INTERNAL MEDICINE

## 2024-07-14 PROCEDURE — 94640 AIRWAY INHALATION TREATMENT: CPT

## 2024-07-14 PROCEDURE — 84165 PROTEIN E-PHORESIS SERUM: CPT

## 2024-07-14 PROCEDURE — 2580000003 HC RX 258: Performed by: INTERNAL MEDICINE

## 2024-07-14 PROCEDURE — 82306 VITAMIN D 25 HYDROXY: CPT

## 2024-07-14 PROCEDURE — 80053 COMPREHEN METABOLIC PANEL: CPT

## 2024-07-14 PROCEDURE — 83550 IRON BINDING TEST: CPT

## 2024-07-14 PROCEDURE — 83605 ASSAY OF LACTIC ACID: CPT

## 2024-07-14 PROCEDURE — 6360000002 HC RX W HCPCS: Performed by: INTERNAL MEDICINE

## 2024-07-14 PROCEDURE — 84100 ASSAY OF PHOSPHORUS: CPT

## 2024-07-14 PROCEDURE — 82746 ASSAY OF FOLIC ACID SERUM: CPT

## 2024-07-14 PROCEDURE — 99232 SBSQ HOSP IP/OBS MODERATE 35: CPT | Performed by: FAMILY MEDICINE

## 2024-07-14 RX ORDER — FUROSEMIDE 10 MG/ML
20 INJECTION INTRAMUSCULAR; INTRAVENOUS ONCE
Status: COMPLETED | OUTPATIENT
Start: 2024-07-14 | End: 2024-07-14

## 2024-07-14 RX ADMIN — APIXABAN 2.5 MG: 2.5 TABLET, FILM COATED ORAL at 08:53

## 2024-07-14 RX ADMIN — BUDESONIDE INHALATION 500 MCG: 0.5 SUSPENSION RESPIRATORY (INHALATION) at 07:45

## 2024-07-14 RX ADMIN — ARFORMOTEROL TARTRATE 15 MCG: 15 SOLUTION RESPIRATORY (INHALATION) at 07:45

## 2024-07-14 RX ADMIN — IPRATROPIUM BROMIDE AND ALBUTEROL SULFATE 1 DOSE: 2.5; .5 SOLUTION RESPIRATORY (INHALATION) at 07:45

## 2024-07-14 RX ADMIN — PANTOPRAZOLE SODIUM 40 MG: 40 TABLET, DELAYED RELEASE ORAL at 07:18

## 2024-07-14 RX ADMIN — FUROSEMIDE 20 MG: 10 INJECTION, SOLUTION INTRAMUSCULAR; INTRAVENOUS at 10:22

## 2024-07-14 RX ADMIN — SODIUM CHLORIDE, POTASSIUM CHLORIDE, SODIUM LACTATE AND CALCIUM CHLORIDE: 600; 310; 30; 20 INJECTION, SOLUTION INTRAVENOUS at 04:39

## 2024-07-14 RX ADMIN — ACETAMINOPHEN 650 MG: 325 TABLET ORAL at 15:49

## 2024-07-14 ASSESSMENT — PAIN SCALES - GENERAL
PAINLEVEL_OUTOF10: 3
PAINLEVEL_OUTOF10: 0

## 2024-07-14 NOTE — PLAN OF CARE
Problem: Discharge Planning  Goal: Discharge to home or other facility with appropriate resources  7/13/2024 2056 by Palmer Bruce RN  Outcome: Progressing  7/13/2024 1301 by Ethel Evans RN  Outcome: Progressing     Problem: Skin/Tissue Integrity  Goal: Absence of new skin breakdown  Description: 1.  Monitor for areas of redness and/or skin breakdown  2.  Assess vascular access sites hourly  3.  Every 4-6 hours minimum:  Change oxygen saturation probe site  4.  Every 4-6 hours:  If on nasal continuous positive airway pressure, respiratory therapy assess nares and determine need for appliance change or resting period.  7/13/2024 2056 by Palmer Bruce RN  Outcome: Progressing  7/13/2024 1301 by Ethel Evans RN  Outcome: Progressing     Problem: Safety - Adult  Goal: Free from fall injury  7/13/2024 2056 by Palmer Bruce RN  Outcome: Progressing  Flowsheets (Taken 7/13/2024 1930)  Free From Fall Injury:   Instruct family/caregiver on patient safety   Based on caregiver fall risk screen, instruct family/caregiver to ask for assistance with transferring infant if caregiver noted to have fall risk factors  7/13/2024 1301 by Ethel Evans RN  Outcome: Progressing     Problem: ABCDS Injury Assessment  Goal: Absence of physical injury  7/13/2024 2056 by Palmer Bruce RN  Outcome: Progressing  Flowsheets (Taken 7/13/2024 1930)  Absence of Physical Injury: Implement safety measures based on patient assessment  7/13/2024 1301 by Ethel Evans RN  Outcome: Progressing  Flowsheets (Taken 7/13/2024 1200)  Absence of Physical Injury: Implement safety measures based on patient assessment     Problem: Pain  Goal: Verbalizes/displays adequate comfort level or baseline comfort level  7/13/2024 2056 by Palmer Bruce RN  Outcome: Progressing  7/13/2024 1301 by Ethel Evans RN  Outcome: Progressing

## 2024-07-14 NOTE — PATIENT CARE CONFERENCE
Intensive Care Daily Quality Rounding Checklist        ICU Team Members: Dr. Galindo, Resident Dr. Quesada, Bedside nurse, Charge nurse, Respiratory therapist      ICU Day #: NUMBER: 3     SOFA Score: 5     Intubation Date: N/A     Ventilator Day #: N/A     Central Line Insertion Date: July 12                                                    Day #: NUMBER: 3                                                    Indication: CVCIndication: Administration of vasopressors or vesicant medications      Arterial Line Insertion Date: N/A                             Day #: N/A     Temporary Hemodialysis Catheter Insertion Date: N/A                             Day # N/A     DVT Prophylaxis: Eliquis    GI Prophylaxis: Protonix     Nuñez Catheter Insertion Date: N/A--External                                         Day #: N/A                             Indications: N/A                             Continued need (if yes, reason documented and discussed with physician): N/A     Skin Issues/ Wounds and ordered treatment discussed on rounds: Generalized bruising, blanchable redness to coccyx     Goals/ Plans for the Day:  Monitor labs and vitals, treat/replace as needed.  Wean Dopamine as able. Wean O2 as able. For echo and CXR.  Transfer to [facility] when bed available.     Reviewed plan and goals for day with patient and/or representative: Yes

## 2024-07-15 LAB
ALBUMIN SERPL-MCNC: 2.6 G/DL (ref 3.5–4.7)
ALPHA1 GLOB SERPL ELPH-MCNC: 0.3 G/DL (ref 0.2–0.4)
ALPHA2 GLOB SERPL ELPH-MCNC: 1.2 G/DL (ref 0.5–1)
B-GLOBULIN SERPL ELPH-MCNC: 1 G/DL (ref 0.8–1.3)
EKG ATRIAL RATE: 100 BPM
EKG ATRIAL RATE: 44 BPM
EKG P AXIS: 94 DEGREES
EKG P-R INTERVAL: 162 MS
EKG Q-T INTERVAL: 452 MS
EKG Q-T INTERVAL: 534 MS
EKG QRS DURATION: 102 MS
EKG QRS DURATION: 90 MS
EKG QTC CALCULATION (BAZETT): 412 MS
EKG QTC CALCULATION (BAZETT): 456 MS
EKG R AXIS: -31 DEGREES
EKG R AXIS: -35 DEGREES
EKG T AXIS: 145 DEGREES
EKG T AXIS: 151 DEGREES
EKG VENTRICULAR RATE: 44 BPM
EKG VENTRICULAR RATE: 50 BPM
GAMMA GLOB SERPL ELPH-MCNC: 1.3 G/DL (ref 0.7–1.6)
P E INTERPRETATION, U: NORMAL
PATHOLOGIST: ABNORMAL
PATHOLOGIST: NORMAL
PROT PATTERN SERPL ELPH-IMP: ABNORMAL
PROT SERPL-MCNC: 6.4 G/DL (ref 6.4–8.3)
SPECIMEN TYPE: NORMAL
SPECIMEN TYPE: NORMAL
URINE IFX INTERP: NORMAL

## 2024-07-15 PROCEDURE — 93010 ELECTROCARDIOGRAM REPORT: CPT | Performed by: INTERNAL MEDICINE

## 2024-07-15 NOTE — DISCHARGE SUMMARY
deviation, left ventricular hypertrophy with QRS widening and repolarization abnormality anteroseptal infarct cited on or before 19-NOV-2022.      Patient admitted for symptomatic bradycardia.    Rest of Hospital course:    There was concern for developing cardiogenic shock. It was recommended to have patient transferred over to [facility] as she is established for her heart history. While awaiting bed availability, patient stayed in [facility] ICU. Patient's bradycardia improved after dopamine drip after attempting atropine without improvement.  Metoprolol and amiodarone were held.  Evaluation by in-house Cardiology.  They agreed with management. Nephrology followed patient as well for stage I LAISHA. Patient remained relatively stable throughout hospital course. Patient was accepted and discharged to [facility].     Discharge Exam:   Physical Exam  Vitals reviewed.   Constitutional:       Appearance: Normal appearance.   HENT:      Head: Normocephalic and atraumatic.      Nose: Nose normal.      Mouth/Throat:      Mouth: Mucous membranes are moist.   Eyes:      Pupils: Pupils are equal, round, and reactive to light.   Cardiovascular:      Rate and Rhythm: Regular rhythm. Bradycardia present.      Pulses: Normal pulses.      Heart sounds: Normal heart sounds.   Pulmonary:      Effort: Pulmonary effort is normal.      Breath sounds: Normal breath sounds.   Abdominal:      General: Bowel sounds are normal. There is no distension.      Palpations: Abdomen is soft.      Tenderness: There is no abdominal tenderness. There is no guarding.   Musculoskeletal:         General: Normal range of motion.      Cervical back: Normal range of motion. No rigidity.      Right lower leg: No edema.      Left lower leg: No edema.   Skin:     General: Skin is warm.   Neurological:      General: No focal deficit present.      Mental Status: She is alert and oriented to person, place, and time.   Psychiatric:         Mood and Affect: Mood normal.         Post

## 2024-07-15 NOTE — PROGRESS NOTES
Radiology Procedure Waiver   Name: Nicki Javed  : 1950  MRN: [id number]    Date:  24    Time: 10:45 PM EDT    Benefits of immediately proceeding with Radiology exam(s) without pre-testing outweigh the risks or are not indicated as specified below and therefore the following is/are being waived:    [] Pregnancy test   [] Patients LMP on-time and regular.   [] Patient had Tubal Ligation or has other Contraception Device.   [] Patient  is Menopausal or Premenarcheal.    [] Patient had Full or Partial Hysterectomy.    [] Protocol for Iodine allergy    [] MRI Questionnaire     [x] BUN/Creatinine   [] Patient age w/no hx of renal dysfunction.   [] Patient on Dialysis.   [] Recent Normal Labs.  Electronically signed by Marco Antonio Car MD on 24 at 10:45 PM EDT            
4 Eyes Skin Assessment     NAME:  Nicki Javed  YOB: 1950  MEDICAL RECORD NUMBER:  [id number]    The patient is being assessed for  Admission    I agree that at least one RN has performed a thorough Head to Toe Skin Assessment on the patient. ALL assessment sites listed below have been assessed.      Areas assessed by both nurses:    Head, Face, Ears, Shoulders, Back, Chest, Arms, Elbows, Hands, Sacrum. Buttock, Coccyx, Ischium, and Legs. Feet and Heels        Does the Patient have a Wound? Blanchable redness to coccyx, scattered bruising to bilateral upper and lower extremities       Abhinav Prevention initiated by RN: Yes  Wound Care Orders initiated by RN: No    Pressure Injury (Stage 3,4, Unstageable, DTI, NWPT, and Complex wounds) if present, place Wound referral order by RN under : No    New Ostomies, if present place, Ostomy referral order under : No     Nurse 1 eSignature: Electronically signed by Joanne Stewart RN on 7/12/24 at 10:36 PM EDT    **SHARE this note so that the co-signing nurse can place an eSignature**    Nurse 2 eSignature: Electronically signed by Fina Cade RN on 7/12/24 at 10:38 PM EDT   
Patient admitted from ER 16 to , with the following belongings; cell phone and , reading glasses/case, tote bag, shirt, pants, undergarments, shoes. Placed on monitor, patient oriented to room and unit visiting hours.  Patient guide at bedside, reviewed patient rights and responsibilities. MRSA nasal swab obtained.  Bed alarm on.  Call light within reach.   
Pt belongings were sent home with the pt , pt took her cell phone and  with her on the ambulance.   
SPIRITUAL HEALTH SERVICES - [facility]  PROGRESS NOTE    Name: Nicki Javed                Sikhism: Baptism   Anointed (Last Rites): NA    Referral: Routine Visit    Assessment:  Upon entering the room  observes patient lying in bed.  Patient was calm and approachable and welcoming to the .  Patient was being visited by a family member.      Intervention:   provided active listening, prayer, and nurtured hope.  left devotional material and information about  services.    Outcome:  Patient seemed encouraged, engaged in conversation, and expressed gratitude.    Plan:  Chaplains will remain available to offer spiritual and emotional support as needed.      Electronically signed by Chaplain Emily, on 7/13/2024 at 1:47 PM.  Spiritual Care Department  [facility]  [phone]     
Normal heart sounds.   Pulmonary:      Effort: Pulmonary effort is normal.      Breath sounds: Normal breath sounds.   Abdominal:      General: Bowel sounds are normal. There is no distension.      Palpations: Abdomen is soft.      Tenderness: There is no abdominal tenderness. There is no guarding.   Musculoskeletal:         General: Normal range of motion.      Cervical back: Normal range of motion. No rigidity.      Right lower leg: No edema.      Left lower leg: No edema.   Skin:     General: Skin is warm.   Neurological:      General: No focal deficit present.      Mental Status: She is alert and oriented to person, place, and time.   Psychiatric:         Mood and Affect: Mood normal.         Labs:  Recent Results (from the past 24 hour(s))   Creatinine, Random Urine    Collection Time: 07/13/24  2:20 PM   Result Value Ref Range    Creatinine, Ur 78.9 29.0 - 226.0 mg/dL   Urinalysis    Collection Time: 07/13/24  2:20 PM   Result Value Ref Range    Color, UA Yellow Yellow    Turbidity UA Clear Clear    Glucose, Ur NEGATIVE NEGATIVE mg/dL    Bilirubin, Urine NEGATIVE NEGATIVE    Ketones, Urine NEGATIVE NEGATIVE mg/dL    Specific Gravity, UA 1.020 1.005 - 1.030    Urine Hgb NEGATIVE NEGATIVE    pH, Urine 5.5 5.0 - 9.0    Protein, UA NEGATIVE NEGATIVE mg/dL    Urobilinogen, Urine 0.2 0.0 - 1.0 EU/dL    Nitrite, Urine NEGATIVE NEGATIVE    Leukocyte Esterase, Urine NEGATIVE NEGATIVE    Comment       Microscopic exam not performed based on chemical results unless requested in original order.   Urea nitrogen, urine    Collection Time: 07/13/24  2:20 PM   Result Value Ref Range    Urea Nitrogen, Ur 484 (L) 800 - 1666 mg/dL   Chloride, Random Urine    Collection Time: 07/13/24  2:20 PM   Result Value Ref Range    Chloride, Ur <20 mmol/L   Microalbumin, Ur    Collection Time: 07/13/24  2:20 PM   Result Value Ref Range    Microalb, Ur 68 (H) 0 - 19 mg/L    Creatinine, Ur 76.9 29.0 - 226.0 mg/dL    Microalb/Crt. Ratio 88 (H) 
right ventricle is normal in size. Right ventricular systolic function is   normal.   - The left atrial cavity is mildly dilated.   - The right atrial cavity is dilated.     - There is moderate (2+ - 3+) tricuspid valve regurgitation.   - There is moderate (2+) pulmonic valve regurgitation.   - Left to right shunt seen on color doppler across the interatrial septum ( clips   #97,99).   - Interval development of a moderate size circumferential pericardial effusion.   There is no significant respiratory variation across the inflows or evidence of   right sided chamber collapse. Normal size IVC. Overall, there is no   echocardiographic evidence of tamponade physiology.        Regadenoson SPECT MPI 7/2023 [facility]  CONCLUSIONS:    1. SPECT Perfusion Study: Normal.    2. There is no scintigraphic evidence for inducible ischemia.    3. No evidence of scarred myocardium.    4. There is a moderate sized nontransmural fixed anterior defect   consistent with breast attenuation artifact    5. Left ventricle is small. The left ventricle systolic function is   normal.    6. This is a low risk scan.      Gated Stress FBP    LVEF % 70        Intake/Output Summary (Last 24 hours) at 7/13/2024 0652  Last data filed at 7/13/2024 0621  Gross per 24 hour   Intake 133.9 ml   Output --   Net 133.9 ml       Labs:   CBC:   Recent Labs     07/11/24 2221 07/13/24 0413   WBC 11.1 13.6*   HGB 9.7* 9.8*   HCT 33.0* 32.3*   * 489*     BMP:   Recent Labs     07/11/24 2221 07/13/24 0413    130*   K 4.8 4.3   CO2 22 21*   BUN 34* 42*   CREATININE 2.1* 2.1*   LABGLOM 24* 24*   CALCIUM 9.0 9.0     Mag:   Recent Labs     07/11/24 2221 07/13/24 0413   MG 2.5 2.6     Phos:   Recent Labs     07/13/24 0413   PHOS 4.8*     TSH:   Recent Labs     07/11/24 2221   TSH 20.32*     HgA1c:     BNP: No results for input(s): \"BNP\" in the last 72 hours.  PT/INR: No results for input(s): \"PROTIME\", \"INR\" in the last 72 hours.  APTT:No results for 
       []  Positive (Details:  )  Sputum Culture:               [] None drawn       [] Negative             []  Positive (Details:  )   Endotracheal aspirate:     [] None drawn       [] Negative             []  Positive (Details:  )     Other pertinent Labs:       Radiology/Imaging:     Chest Xray (7/13/2024):    No images today    ASSESSMENT:     Principal Problem:    Bradycardia  Hypotension  LAISHA on chronic kidney disease    Active Problems:  History of paroxysmal atrial fibrillation (HCC) status post ablation with recurrence in 2018 and status post cardioversion x 2 in 2021  History of pericardial effusion status post pericardiocentesis on May 24 cytology was negative for malignancy  History of HCOM  Chronic obstructive pulmonary disease (HCC)/moderate  History of right upper lobe non-small cell lung carcinoma stage I A2 adenocarcinoma diagnosed in April 2022 status post SBRT with no evidence of recurrence  History of chronic kidney disease  History of pulmonary nodules follows with [facility].  They have been stable since May 2023            PLAN:     WEAN PER PROTOCOL:  [] No   [x] Yes  [] N/A    DISCONTINUE ANY LABS:   [x] No   [] Yes    ICU PROPHYLAXIS:  Stress ulcer:  [] PPI Agent  [] [id number] [] Sucralfate  [] Other:  VTE:  Patient is on Eliquis [] Enoxaparin  [] Unfract. Heparin Subcut  [] EPC Cuffs    NUTRITION:  [] NPO [] Tube Feeding (Specify: ) [] TPN  [] PO (Diet: ADULT DIET; Regular; Low Fat/Low Chol/High Fiber/2 gm Na; 2000 ml)    HOME MEDICATIONS RECONCILED: [x] No  [] Yes    INSULIN DRIP:   [x] No   [] Yes    CONSULTATION NEEDED:  [] No   [x] Yes    FAMILY UPDATED:    [] No   [x] Yes    TRANSFER OUT OF ICU:   [x] No   [] Yes    ADDITIONAL PLAN:  Will attempt to wean dopamine for heart rates above 50 and MAP above 65  Continue to hold Lopressor and amiodarone  Most on Pulmicort Brovana and Nathanael.  Will consult nephrology given her LAISHA and chronic kidney disease  Hold spironolactone and 
  MS:n/a.  : Deferred  Rectal Exam: Deferred  SKIN: warm and dry  NEURO / PSYCH: oriented to person, place          IMPRESSION/PLAN:  Bradycardia - Likely Sinus node dysfunction. HR better. Continue to hold BB and AV blocking medications.  Needs EP consult. Waiting bed at [facility].   Hypotension due to above  - On low dose Dopamine. Monitor BP  Hypertrophic cardiomyopathy - follows at [facility]  Chronic HFpEF - Resume Diuretics tomorrow  Paroxysmal atrial fibrillation and DCCV 2021 x 2. Prior ablation 2018. Resume her Eliquis for OAC.  Hx of Large pericardial effusion s/p pericardiocentesis 5/2024.  No recurrent effusion noted on CTA chest  LAISHA/CKD creatinine 2.1 - Monitor renal Fn  Anemia - Monitor H/H, 9.8   Elevated TSH 20.32, with normal free T4 1.1  VHD - MR, TR  Non small cell lung cancer - s/p XRT 2022  Hypertension - Controlled  Pulmonary HTN         Above d/w her - No family at bed side  Awaiting transfer to [facility]    D/w her nursing staff.    Electronically signed by Andrzej Rocha MD on 7/14/2024   [facility]    Addendum  Dr Galindo ordered Stat Echo - Showed Normal Ef, No Pericardial effusion.    Andrzej Rocha MD    
cardiomyopathy  -Telemetry  - Hold metoprolol and amiodarone  - Hold diuretics for now  -Cardiology following , no plans for pacemaker.      COPD  -On Trelegy Ellipta at home     Hyperlipidemia  - Atorvastatin 20 mg daily     Pain Control:  Tylenol 650 mg Q6HR PRN for mild pain  DVT ppx: Eliquis 5 mg twice daily  GI ppx: Protonix 40 mg daily  Code Status: Full  Diet: Cardiac diet           Disposition: Discharge to pending clinical course    Karol Radford MD   Family Medicine Resident PGY-3  07/13/24   6:15 AM    
labs 7/14, if free T3 remains low we will consider starting her on a very small dose of levothyroxine    Social/Spiritual/DNR/Other  Code status: Full Code  Diet ADULT DIET; Regular; Low Fat/Low Chol/High Fiber/2 gm Na; 2000 ml  Stress ulcer prophylaxis: Protonix  DVT prophylaxis: pharmacologic prophylaxis (with the following: Eliquis)  Consultations needed: Yes  Transfer out of ICU today? No    Lines/catheters  Date  7/12 right femoral central line    Patient remains critically ill and requiring ICU level care.  Awaiting transfer to [facility] for EP evaluation. Will F/U with ECHO today.     Paco Quesada MD  10:15 AM  07/14/24       I personally saw, examined and provided care for the patient. Radiographs, labs and medication list were reviewed by me independently. I spoke with bedside nursing, therapists and consultants. Critical care services and times documented are independent of procedures and multidisciplinary rounds with Residents. Additionally comprehensive, multidisciplinary rounds were conducted with the MICU team. The case was discussed in detail and plans for care were established. Review of Residents documentation was conducted and revisions were made as appropriate. I agree with the above documented exam, problem list and plan of care.I performed the substantive portion of the visit.      During multidisciplinary team rounds the patient was seen, examined and discussed. This is confirmation that I have personally seen and examined the patient and that the key elements of the encounter were performed by me (> 85 % time).  The medications & laboratory data and imagery was discussed and adjusted where necessary. Key issues of the case were discussed among consultants.     This patient has a high probability of sudden clinically significant deterioration. I managed/supervised life or organ supporting interventions that required frequent physician assessment. I devoted my full attention to the 
in the setting of the bradycardia and the hypotension exacerbated by the outpt loop diuretic + MRA + IV Contrast for the CTA  UA (-0 for ketones, bili, gluc, blood and protein in a concentrated specimen with a SG 1.020  Microalb: Cr 88  PLAN:  Follow Lbas  Follow I/O's  Avoid further nephrotoxins    2-CKD G3B A2 with a baseline serum cr apprx 1.3-1.7mg/dl with an associated e-GFR 31-44ml/min presumed sec to microvascular disease  PLAN:  Monitor labs  Once the LAISHA improved would consider the addition SGLT2i    3-HTN with CKD I-IV with associated Acute on Chronic HFpEF  Bradycardia on the BB  BP goal <130/80(ACC/AHA Target)  Pro-BNP 11,871-->24,275  PLAN:  Holding the BB,  spironolactone  and amiodarone  Follow BP  Follow Pro-BNP  Stop the IVF as taking po well  Dose with furosemide 20mg IV x 1 dose    4- Hyponatremia sec to increased ADH in the setting of the acute on Chronic HFpEF as well as the hypotension and bradycardia  PLAN:  Will use a free water restriction  Follow Na+    5- Macrocytic anemia in the setting of the Lung cancer and the CKD-Macrocytosis exacerbated by the Hypothyroidism with the TSH 20.32, FT4 1.1 and the low T3 1.19  B12 572, Folate 6.8  PLAN:  1.Follow CBC  2. Hold on FRANCISCO for present  3. Consider the addition of a very low dose levothyroxine/liothyronine     6- Sec HPTH of Renal Origin  PTH 73  Ca++ & PO4 WNL  PLAN:  Await Vit D    Thank you for allowing us to participate in care of Nicki Soliman MD  9:58 AM  7/14/2024

## 2024-07-16 LAB
MICROORGANISM SPEC CULT: ABNORMAL
SERVICE CMNT-IMP: ABNORMAL
SPECIMEN DESCRIPTION: ABNORMAL

## 2024-07-21 ENCOUNTER — PATIENT MESSAGE (OUTPATIENT)
Dept: FAMILY MEDICINE CLINIC | Age: 74
End: 2024-07-21

## 2024-07-21 DIAGNOSIS — R53.1 WEAKNESS: Primary | ICD-10-CM

## 2024-07-24 ENCOUNTER — TELEPHONE (OUTPATIENT)
Dept: FAMILY MEDICINE CLINIC | Age: 74
End: 2024-07-24

## 2024-07-24 DIAGNOSIS — R29.898 WEAKNESS OF BOTH LOWER EXTREMITIES: Primary | ICD-10-CM

## 2024-07-24 NOTE — TELEPHONE ENCOUNTER
Patient called and stated that her legs are really weak and she would like a referral to a physical therapist somewhere in [place] close to her house.

## 2024-07-25 NOTE — TELEPHONE ENCOUNTER
From: Nicki Javed  To: Dr. Nu Hidalgo  Sent: 7/21/2024 5:34 PM EDT  Subject: Pacemaker     I had a pacemaker put in on July 16, 2024 at the [facility]. I was discharged on the following day.   My legs are very weak and the physical therapist at the clinic recommended doing physical therapy to strengthen the muscles. Can you please write me a prescription for physical therapy. I can stop by your office and pick it up.  Thank you   Nicki Javed

## 2024-08-14 ENCOUNTER — EVALUATION (OUTPATIENT)
Dept: PHYSICAL THERAPY | Age: 74
End: 2024-08-14
Payer: MEDICARE

## 2024-08-14 DIAGNOSIS — R53.1 WEAKNESS: Primary | ICD-10-CM

## 2024-08-14 PROCEDURE — 97110 THERAPEUTIC EXERCISES: CPT | Performed by: PHYSICAL THERAPIST

## 2024-08-14 PROCEDURE — 97162 PT EVAL MOD COMPLEX 30 MIN: CPT | Performed by: PHYSICAL THERAPIST

## 2024-08-14 NOTE — PROGRESS NOTES
[facility]          Phone: [phone] Fax: [phone]    Physical Therapy Daily Treatment Note  Date:  2024    Patient Name:  Nicki Javed    :  1950  MRN: [id number]    Evaluating therapist: Emely Mireles, PT, DPT  [id number]    Restrictions/Precautionsrecent surgery, falls risk, pacemaker / defibrillator, cancer remission less than 5 years; no heat, electrical stimulation, ultrasound., anti-coagulation medication, COPD    Pacemaker precautions until 2024 (was implanted 24)    Diagnosis:     Diagnosis Orders   1. Weakness          Treatment Diagnosis:    Insurance/Certification information:  [facility] Medicare  Referring Physician:  Nu Hidalgo DO  Plan of care signed (Y/N):    Visit# / total visits:    Pain level: 0/10   Time In:  1005  Time Out:  1050    Subjective:  See initial evaluation    Exercises:  Exercise/Equipment Resistance/Repetitions Other comments            Side stepping 2x laps along treatment table Light B UE support     Sit<>stand 5x Emphasis on avoiding L UE use     LAQ 10x ea LE Add weight next session if tolerated     Seated clamshell 10x YTB                                                                                                          Other:  Pt tolerated initial evaluation and introduction of TE's for HEP but did need 2x breaks during activity to allow dyspnea to subside. Pt moves with slow pace and hesitancy in balance when without SPC in therapy gym. She verbalized understanding and activity pacing recommendations of HEP. Pt would also benefit from shower chair to improve energy conservation during completion of ADLs-- discussed with pt during eval this date.    Home Exercise Program:  LAQ, seated clamshell, sit<>stand, side stepping    Manual Treatments:  --    Modalities:  --     Time-in Time-out Total Time   46352  Evaluation Low Complexity      75073  Evaluation Med Complexity 1005 1035 30   69559  Evaluation 
partially loculated small pleural effusions with improvement of basilar atelectasis.  Improving superimposed edema or inflammation is not excluded.  Mild improvement of perihilar edema or inflammation. Cardiomediastinal silhouette:  Heart is enlarged.  Thoracic aorta is mildly tortuous with atherosclerotic calcifications. Bones and soft tissues:  The visualized bones are osteopenic.    IMPRESSION: See result : LIZETH   Transcribe Date/Time: Jul 17 2024  3:27P Dictated by : SKY MORALES MD This examination was interpreted and the report reviewed and electronically signed by: SKY MORALES MD on Jul 17 2024  3:28PM  EST    XR CHEST 1 VIEW    Result Date: 7/16/2024  * * *Final Report* * * DATE OF EXAM: Jul 16 2024  8:00AM   JIX   5290  -  XR CHEST 1V FRONTAL   / ACCESSION #  [id number] PROCEDURE REASON: Evaluate tube, line,  or lead position      * * * * Physician Interpretation * * * *  EXAMINATION:  CHEST RADIOGRAPH (PORTABLE SINGLE VIEW AP) Exam Date/Time:  7/16/2024 8:00 AM Clinical History: MQ:  [id number] Comparison:  1 day prior RESULT: Lines, tubes, and devices:  Temporary right IJ pacemaker lead overlies the right ventricle.  Defibrillator pads have been removed in the interval. Lungs and pleura:  Right greater then left hazy and heterogeneous opacities noted including some interstitial opacities suggestive of asymmetric pleural effusions, associated atelectasis/consolidation and probable pulmonary edema.   Other processes such as aspiration or infection not excluded if clinical picture fits.   No substantial pneumothorax appreciated. Cardiomediastinal silhouette:  Stable cardiomediastinal silhouette.   Atherosclerotic aorta. Other:  .    IMPRESSION: See result. : LIZETH   Transcribe Date/Time: Jul 16 2024 10:34A Dictated by : DOMINGO FREGOSO MD This examination was interpreted and the report reviewed and electronically signed by: DOMINGO FREGOSO MD on Jul 16 2024 10:35AM  EST      Past

## 2024-08-21 ENCOUNTER — TREATMENT (OUTPATIENT)
Dept: PHYSICAL THERAPY | Age: 74
End: 2024-08-21
Payer: MEDICARE

## 2024-08-21 DIAGNOSIS — R53.1 WEAKNESS: Primary | ICD-10-CM

## 2024-08-21 PROCEDURE — 97110 THERAPEUTIC EXERCISES: CPT | Performed by: PHYSICAL THERAPIST

## 2024-08-21 PROCEDURE — 97112 NEUROMUSCULAR REEDUCATION: CPT | Performed by: PHYSICAL THERAPIST

## 2024-08-21 NOTE — PROGRESS NOTES
[facility]          Phone: [phone] Fax: [phone]    Physical Therapy Daily Treatment Note  Date:  2024    Patient Name:  Nicki Javed    :  1950  MRN: [id number]    Evaluating therapist: Emely Mireles PT, DPT  [id number]    Restrictions/Precautionsrecent surgery, falls risk, pacemaker / defibrillator, cancer remission less than 5 years; no heat, electrical stimulation, ultrasound., anti-coagulation medication, COPD    Pacemaker precautions until 2024 (was implanted 24)    Diagnosis:     Diagnosis Orders   1. Weakness          Treatment Diagnosis:    Insurance/Certification information:  [facility] Medicare  Referring Physician:  Nu Hidalgo DO  Plan of care signed (Y/N):  yes  Visit# / total visits:    Pain level: 0/10   Time In:  0807  Time Out:  0835    Subjective:  Pt has no new reports this AM. She notes weakness of B ankles in addition to other B LE weakness.    Exercises:  Exercise/Equipment Resistance/Repetitions Other comments            Side stepping Light B UE support     Sit<>stand 5x Emphasis on avoiding L UE use     LAQ Add weight next session if tolerated     Seated clamshell YTB            Bike 5 min Level 2     Standing heel raises 10x 2 Circuit training     Standing 3 way kicks 10x2 B LE      Single leg balance 20 sec reps 4x ea LE Circuit training     Step ups 10x ea LE leading 6\" step with BHR for balance     Standing marches 10x 2 reciprocal Circuit training     Mini squats 10x 2 Circuit training                                               Other: Pt tolerated TE's this date and verbalized understanding for HEP. She had difficulty with single leg balance maintaining attempts <1 second before needing to tap LE down to regain balance.    Home Exercise Program:  LAQ, seated clamshell, sit<>stand, side stepping, heel raises, marches, 3-way ,kicks, mini squats, single leg balance    Manual Treatments:  --    Modalities:  --

## 2024-08-28 ENCOUNTER — TREATMENT (OUTPATIENT)
Dept: PHYSICAL THERAPY | Age: 74
End: 2024-08-28
Payer: MEDICARE

## 2024-08-28 DIAGNOSIS — R53.1 WEAKNESS: Primary | ICD-10-CM

## 2024-08-28 PROCEDURE — 97112 NEUROMUSCULAR REEDUCATION: CPT | Performed by: PHYSICAL THERAPIST

## 2024-08-28 PROCEDURE — 97110 THERAPEUTIC EXERCISES: CPT | Performed by: PHYSICAL THERAPIST

## 2024-08-28 NOTE — PROGRESS NOTES
[facility]          Phone: [phone] Fax: [phone]    Physical Therapy Daily Treatment Note  Date:  2024    Patient Name:  Nicki Javed    :  1950  MRN: [id number]    Evaluating therapist: Emely Mireles PT, DPT  [id number]    Restrictions/Precautionsrecent surgery, falls risk, pacemaker / defibrillator, cancer remission less than 5 years; no heat, electrical stimulation, ultrasound., anti-coagulation medication, COPD    Pacemaker precautions until 2024 (was implanted 24)    Diagnosis:     Diagnosis Orders   1. Weakness            Treatment Diagnosis:    Insurance/Certification information:  [facility]  Referring Physician:  Nu Hidalgo DO  Plan of care signed (Y/N):  yes  Visit# / total visits:    Pain level: 0/10   Time In:  1030  Time Out:  1100    Subjective:  Pt reports she has not had any issues with HEP. She is noticing decreased need for B UE support with sit<>stand transitions.     She has upcoming appointment for pacemaker revision as current  L sided pacemaker was found to have an infection.    Exercises:  Exercise/Equipment Resistance/Repetitions Other comments            Side stepping Light B UE support     Sit<>stand 5x2 Emphasis on avoiding L UE use     LAQ Add weight next session if tolerated     Seated clamshell YTB            Bike Level 2     Standing heel raises 10x 2      Standing 3 way kicks      Single leg balance      Step ups 10x ea LE leading 6\" step with BHR for balance     Standing marches 10x 2 reciprocal      Mini squats             Stair negotiation 12 stairs with single HR SBA, non-reciprocal     Obstacle course 4x reps [employer] weaving x4, step over 1/2 foam roller     Obstacle course 4x reps Marching on foam pad, stepping over 2x quad canes, stepping onto staggered ther-ex discs     Grape vine dancing 2 min             Other: Pt demonstrated improved balance and gait quality this session without use of SPC

## 2024-10-10 ENCOUNTER — OFFICE VISIT (OUTPATIENT)
Dept: FAMILY MEDICINE CLINIC | Age: 74
End: 2024-10-10
Payer: MEDICARE

## 2024-10-10 VITALS
DIASTOLIC BLOOD PRESSURE: 74 MMHG | HEIGHT: 65 IN | TEMPERATURE: 97.2 F | WEIGHT: 117.5 LBS | OXYGEN SATURATION: 96 % | SYSTOLIC BLOOD PRESSURE: 103 MMHG | HEART RATE: 60 BPM | BODY MASS INDEX: 19.58 KG/M2

## 2024-10-10 DIAGNOSIS — I73.9 PVD (PERIPHERAL VASCULAR DISEASE) WITH CLAUDICATION (HCC): ICD-10-CM

## 2024-10-10 DIAGNOSIS — C34.91 ADENOCARCINOMA OF RIGHT LUNG (HCC): Primary | ICD-10-CM

## 2024-10-10 PROBLEM — E46 PROTEIN CALORIE MALNUTRITION (HCC): Status: RESOLVED | Noted: 2022-11-30 | Resolved: 2024-10-10

## 2024-10-10 PROCEDURE — 1123F ACP DISCUSS/DSCN MKR DOCD: CPT | Performed by: FAMILY MEDICINE

## 2024-10-10 PROCEDURE — G8427 DOCREV CUR MEDS BY ELIG CLIN: HCPCS | Performed by: FAMILY MEDICINE

## 2024-10-10 PROCEDURE — 3074F SYST BP LT 130 MM HG: CPT | Performed by: FAMILY MEDICINE

## 2024-10-10 PROCEDURE — 1090F PRES/ABSN URINE INCON ASSESS: CPT | Performed by: FAMILY MEDICINE

## 2024-10-10 PROCEDURE — 99213 OFFICE O/P EST LOW 20 MIN: CPT | Performed by: FAMILY MEDICINE

## 2024-10-10 PROCEDURE — 1036F TOBACCO NON-USER: CPT | Performed by: FAMILY MEDICINE

## 2024-10-10 PROCEDURE — 3078F DIAST BP <80 MM HG: CPT | Performed by: FAMILY MEDICINE

## 2024-10-10 PROCEDURE — G8400 PT W/DXA NO RESULTS DOC: HCPCS | Performed by: FAMILY MEDICINE

## 2024-10-10 PROCEDURE — G8484 FLU IMMUNIZE NO ADMIN: HCPCS | Performed by: FAMILY MEDICINE

## 2024-10-10 PROCEDURE — G8420 CALC BMI NORM PARAMETERS: HCPCS | Performed by: FAMILY MEDICINE

## 2024-10-10 SDOH — ECONOMIC STABILITY: FOOD INSECURITY: WITHIN THE PAST 12 MONTHS, YOU WORRIED THAT YOUR FOOD WOULD RUN OUT BEFORE YOU GOT MONEY TO BUY MORE.: NEVER TRUE

## 2024-10-10 SDOH — ECONOMIC STABILITY: INCOME INSECURITY: HOW HARD IS IT FOR YOU TO PAY FOR THE VERY BASICS LIKE FOOD, HOUSING, MEDICAL CARE, AND HEATING?: NOT HARD AT ALL

## 2024-10-10 ASSESSMENT — PATIENT HEALTH QUESTIONNAIRE - PHQ9
SUM OF ALL RESPONSES TO PHQ QUESTIONS 1-9: 0
SUM OF ALL RESPONSES TO PHQ QUESTIONS 1-9: 0
2. FEELING DOWN, DEPRESSED OR HOPELESS: NOT AT ALL
SUM OF ALL RESPONSES TO PHQ QUESTIONS 1-9: 0
SUM OF ALL RESPONSES TO PHQ9 QUESTIONS 1 & 2: 0
SUM OF ALL RESPONSES TO PHQ QUESTIONS 1-9: 0
1. LITTLE INTEREST OR PLEASURE IN DOING THINGS: NOT AT ALL

## 2024-10-10 NOTE — PROGRESS NOTES
processes      Assessment/Plan  Nicki was seen today for wound check.    Diagnoses and all orders for this visit:    Adenocarcinoma of right lung (HCC)    PVD (peripheral vascular disease) with claudication (HCC)    Due to complex nature of her comorbidities, I would like her to see vascular surgerijoleen,  I did speak with Dr. Mcgill and she will see her next week.  I sent pictures via [employer].      No follow-ups on file.      Nu Resendiz, DO    Call or go to ED immediately if symptoms worsen or persist.    Educational materials and/or home exercises printed for patient's review and were included in patient instructions on his/her After Visit Summary and given to patient at the end of visit.       Counseled regarding above diagnosis, including possible risks and complications,  especially if left uncontrolled.    Counseled regarding the possible side effects, risks, benefits and alternatives to treatment; patient and/or guardian verbalizes understanding, agrees, feels comfortable with and wishes to proceed with above treatment plan.    Advised patient to call with any new medication issues, and read all Rx info from pharmacy to assure aware of all possible risks and side effects of medication before taking.    Reviewed age and gender appropriate health screening exams and vaccinations.  Advised patient regarding importance of keeping up with recommended health maintenance and to schedule as soon as possible if overdue, as this is important in assessing for undiagnosed pathology, especially cancer, as well as protecting against potentially harmful/life threatening disease.      Patient and/or guardian verbalizes understanding and agrees with above counseling, assessment and plan.    All questions answered.

## 2024-10-15 ENCOUNTER — HOSPITAL ENCOUNTER (OUTPATIENT)
Dept: WOUND CARE | Age: 74
Discharge: HOME | End: 2024-10-15
Attending: SURGERY
Payer: MEDICARE

## 2024-10-15 VITALS
HEART RATE: 84 BPM | TEMPERATURE: 97.5 F | DIASTOLIC BLOOD PRESSURE: 57 MMHG | RESPIRATION RATE: 18 BRPM | SYSTOLIC BLOOD PRESSURE: 114 MMHG

## 2024-10-15 DIAGNOSIS — S81.812A SKIN TEAR OF LEFT LOWER LEG WITHOUT COMPLICATION, INITIAL ENCOUNTER: Primary | ICD-10-CM

## 2024-10-15 PROBLEM — S51.811A SKIN TEAR OF FOREARM WITHOUT COMPLICATION, RIGHT, INITIAL ENCOUNTER: Status: ACTIVE | Noted: 2024-10-15

## 2024-10-15 PROCEDURE — 11045 DBRDMT SUBQ TISS EACH ADDL: CPT

## 2024-10-15 PROCEDURE — 11042 DBRDMT SUBQ TIS 1ST 20SQCM/<: CPT

## 2024-10-15 RX ORDER — LIDOCAINE HYDROCHLORIDE 40 MG/ML
SOLUTION TOPICAL ONCE
OUTPATIENT
Start: 2024-10-15 | End: 2024-10-15

## 2024-10-15 RX ORDER — SODIUM CHLOR/HYPOCHLOROUS ACID 0.033 %
SOLUTION, IRRIGATION IRRIGATION ONCE
OUTPATIENT
Start: 2024-10-15 | End: 2024-10-15

## 2024-10-15 RX ORDER — LIDOCAINE 50 MG/G
OINTMENT TOPICAL ONCE
OUTPATIENT
Start: 2024-10-15 | End: 2024-10-15

## 2024-10-15 RX ORDER — BACITRACIN ZINC 500 [USP'U]/G
OINTMENT TOPICAL ONCE
OUTPATIENT
Start: 2024-10-15 | End: 2024-10-15

## 2024-10-15 RX ORDER — LIDOCAINE 40 MG/G
CREAM TOPICAL ONCE
OUTPATIENT
Start: 2024-10-15 | End: 2024-10-15

## 2024-10-15 RX ORDER — SILVER SULFADIAZINE 10 MG/G
CREAM TOPICAL ONCE
OUTPATIENT
Start: 2024-10-15 | End: 2024-10-15

## 2024-10-15 RX ORDER — GENTAMICIN SULFATE 1 MG/G
OINTMENT TOPICAL ONCE
OUTPATIENT
Start: 2024-10-15 | End: 2024-10-15

## 2024-10-15 RX ORDER — BACITRACIN ZINC AND POLYMYXIN B SULFATE 500; 1000 [USP'U]/G; [USP'U]/G
OINTMENT TOPICAL ONCE
OUTPATIENT
Start: 2024-10-15 | End: 2024-10-15

## 2024-10-15 RX ORDER — CLOBETASOL PROPIONATE 0.5 MG/G
OINTMENT TOPICAL ONCE
OUTPATIENT
Start: 2024-10-15 | End: 2024-10-15

## 2024-10-15 RX ORDER — LIDOCAINE HYDROCHLORIDE 20 MG/ML
JELLY TOPICAL ONCE
OUTPATIENT
Start: 2024-10-15 | End: 2024-10-15

## 2024-10-15 RX ORDER — MUPIROCIN 20 MG/G
OINTMENT TOPICAL ONCE
OUTPATIENT
Start: 2024-10-15 | End: 2024-10-15

## 2024-10-15 RX ORDER — TRIAMCINOLONE ACETONIDE 1 MG/G
OINTMENT TOPICAL ONCE
OUTPATIENT
Start: 2024-10-15 | End: 2024-10-15

## 2024-10-15 RX ORDER — BETAMETHASONE DIPROPIONATE 0.5 MG/G
CREAM TOPICAL ONCE
OUTPATIENT
Start: 2024-10-15 | End: 2024-10-15

## 2024-10-15 RX ORDER — NEOMYCIN/BACITRACIN/POLYMYXINB 3.5-400-5K
OINTMENT (GRAM) TOPICAL ONCE
OUTPATIENT
Start: 2024-10-15 | End: 2024-10-15

## 2024-10-15 NOTE — PLAN OF CARE
Problem: Cognitive:  Goal: Knowledge of wound care  Description: Knowledge of wound care  Outcome: Progressing  Goal: Understands risk factors for wounds  Description: Understands risk factors for wounds  Outcome: Progressing     Problem: Wound:  Goal: Will show signs of wound healing; wound closure and no evidence of infection  Description: Will show signs of wound healing; wound closure and no evidence of infection  Outcome: Progressing     Problem: Venous:  Goal: Signs of wound healing will improve  Description: Signs of wound healing will improve  Outcome: Progressing     Problem: Falls - Risk of:  Goal: Will remain free from falls  Description: Will remain free from falls  Outcome: Progressing

## 2024-10-15 NOTE — PROGRESS NOTES
[facility]aling Center Followup Visit Note    Referring Physician : Nu Lopez DO  Nicki Javed  MEDICAL RECORD NUMBER:  [id number]  AGE: 74 y.o.   GENDER: female  : 1950  EPISODE DATE:  10/15/2024    Subjective:     Chief Complaint   Patient presents with    Wound Check     Leg left and right arm      HISTORY of PRESENT ILLNESS HPI   Nicki Javed is a 74 y.o. female who presents today in regards to follow up evaluation and treatment of wound/ulcer.  That patient's past medical, family and social hx were reviewed and changes were made if present.    History of Wound Context:  74 y female s/p fall. She is on eliquis. She fell about 2 weeks ago and hit her leg on a commode. She also sustained an arm wound from trying to support herself.      Wound/Ulcer Pain Timing/Severity: intermittent  Quality of pain: sharp  Severity:  7 / 10   Modifying Factors: Pain worsens with manipulation  Associated Signs/Symptoms: edema and drainage    10/15/24 Unna boot and alginate to left leg to control edema. Adaptic and alginate to arm with loose kerlex wrap. Will check in 1 week.     Ulcer Identification:  Ulcer Type: traumatic  Contributing Factors: shear force    Diabetic/Pressure/Non Pressure Ulcers only:  Ulcer: Non-Pressure ulcer, fat layer exposed    Wound: Laceration        PAST MEDICAL HISTORY      Diagnosis Date    Atrial fibrillation (HCC)     Dr. Cantrell     Cancer (HCC)     lung    Cardiomyopathy (HCC)     Dr. Cantrell,     CVA (cerebral vascular accident) ([facility])         Hyperlipidemia     Hypertension     PVD (peripheral vascular disease) with claudication ([facility]) 2016    Skin cancer, basal cell     yrs ago    Spider veins 2016    Varicose veins of both lower extremities 2016     Past Surgical History:   Procedure Laterality Date    BREAST SURGERY Left     yrs ago benign    BRONCHOSCOPY N/A 2020    BRONCHOSCOPY EBUS performed by Laura Arana MD at [facility] [place], [place] ENDOSCOPY    
Moderate (25-50%) 10/15/24 1408   Drainage Description Serosanguinous 10/15/24 1408   Odor None 10/15/24 1408   Number of days: 0          Supplies Requested :      WOUND #: 1 and 2   PRIMARY DRESSING:  Hydrofera Blue ready   Cover and Secure with: ABD pad  Bulky roll gauze     FREQUENCY OF DRESSING CHANGES:  Daily       WOUND #: 3   PRIMARY DRESSING:  Other: primary dressing: adaptic    Secondary: alginate   Cover and Secure with: ABD pad  Bulky roll gauze     FREQUENCY OF DRESSING CHANGES:  Daily     Dispense as written    ADDITIONAL ITEMS:  [] Gloves Small  [x] Gloves Medium [] Gloves Large [] Gloves XLarge  [] Tape 1\" [x] Tape 2\" [] Tape 3\"  [] Medipore Tape  [x] Saline  [] Skin Prep   [] Adhesive Remover   [] Cotton Tip Applicators   [] Other:    Patient Wound(s) Debrided: [x] Yes if yes please add date 10/15/24   [] No    Debribement Type: Excisional/Sharp    Patient currently being seen by Home Health: [] Yes   [x] No    Duration for needed supplies:  []15  []30  [x]60  []90 Days    Electronically signed by Danyell Staples RN on 10/15/2024 at 4:16 PM     Provider Information:      PROVIDER'S NAME: Dr Anna Mcgill    Electronically signed    NPI: [id number]

## 2024-10-15 NOTE — DISCHARGE INSTRUCTIONS
Visit Discharge/Physician Orders    Discharge condition: Stable    Assessment of pain at discharge:    Anesthetic used:     Discharge to: Home    Left via:Private automobile    Accompanied by: accompanied by family    ECF/HHA: [facility] for wound care supplies    Dressing Orders:     To right arm wounds, cleanse with normal saline solution and apply adaptic and alginate on top, ABD pad and a dry dressing. No tape to the skin!  To left leg wounds, cleanse with normal saline solution and apply Aquacel alginate and cover with ABD pad, unna boot and coban. Change weekly at the [facility].    Keep wrap dry at all times. If wrap becomes wet or falls 2 inches call [facility] ([phone])and may remove wrap and apply double tubigrip.     Treatment Orders:    FOLLOW NUTRITIOUS DIET. CHOOSE FOODS HIGH IN PROTEIN -CHICKEN- FISH-AND EGGS,  CHOOSE FOODS HIGH IN VITAMIN C.   MULTIVITAMIN DAILY.      [facility] followup visit _______1 week______________________  (Please note your next appointment above and if you are unable to keep, kindly give a 24 hour notice. Thank you.)    Physician signature:__________________________      If you experience any of the following, please call the Wound Care Center during business hours:    * Increase in Pain  * Temperature over 101  * Increase in drainage from your wound  * Drainage with a foul odor  * Bleeding  * Increase in swelling  * Need for compression bandage changes due to slippage, breakthrough drainage.    If you need medical attention outside of the business hours of the Wound Care Centers please contact your PCP or go to the nearest emergency room.

## 2024-10-16 NOTE — DISCHARGE INSTRUCTIONS
Visit Discharge/Physician Orders     Discharge condition: Stable     Assessment of pain at discharge:     Anesthetic used:      Discharge to: Home     Left via:Private automobile     Accompanied by: accompanied by family     ECF/HHA: [facility] for wound care supplies     Dressing Orders:      To right arm wounds, cleanse with normal saline solution and apply adaptic and alginate on top, ABD pad and a dry dressing. No tape to the skin!  To left leg wounds, cleanse with normal saline solution and apply hydroferra blue pad and cover with ABD pad, unna boot and coban. Change weekly at the [facility].     Keep wrap dry at all times. If wrap becomes wet or falls 2 inches call [facility] ([phone])and may remove wrap and apply double tubigrip.      Treatment Orders:     FOLLOW NUTRITIOUS DIET. CHOOSE FOODS HIGH IN PROTEIN -CHICKEN- FISH-AND EGGS,  CHOOSE FOODS HIGH IN VITAMIN C.   MULTIVITAMIN DAILY.       [facility] followup visit _______1 week______________________  (Please note your next appointment above and if you are unable to keep, kindly give a 24 hour notice. Thank you.)     Physician signature:__________________________        If you experience any of the following, please call the Wound Care Center during business hours:     * Increase in Pain  * Temperature over 101  * Increase in drainage from your wound  * Drainage with a foul odor  * Bleeding  * Increase in swelling  * Need for compression bandage changes due to slippage, breakthrough drainage.     If you need medical attention outside of the business hours of the Wound Care Centers please contact your PCP or go to the nearest emergency room.

## 2024-10-22 ENCOUNTER — HOSPITAL ENCOUNTER (OUTPATIENT)
Dept: WOUND CARE | Age: 74
Discharge: HOME | End: 2024-10-22
Attending: SURGERY
Payer: MEDICARE

## 2024-10-22 VITALS
HEART RATE: 88 BPM | RESPIRATION RATE: 18 BRPM | TEMPERATURE: 97.2 F | SYSTOLIC BLOOD PRESSURE: 120 MMHG | WEIGHT: 117 LBS | BODY MASS INDEX: 19.49 KG/M2 | DIASTOLIC BLOOD PRESSURE: 62 MMHG | HEIGHT: 65 IN

## 2024-10-22 DIAGNOSIS — S51.811A SKIN TEAR OF FOREARM WITHOUT COMPLICATION, RIGHT, INITIAL ENCOUNTER: Primary | ICD-10-CM

## 2024-10-22 PROCEDURE — 11042 DBRDMT SUBQ TIS 1ST 20SQCM/<: CPT

## 2024-10-22 RX ORDER — LIDOCAINE HYDROCHLORIDE 40 MG/ML
SOLUTION TOPICAL ONCE
Status: COMPLETED | OUTPATIENT
Start: 2024-10-22 | End: 2024-10-22

## 2024-10-22 RX ORDER — LIDOCAINE 50 MG/G
OINTMENT TOPICAL ONCE
OUTPATIENT
Start: 2024-10-22 | End: 2024-10-22

## 2024-10-22 RX ORDER — LIDOCAINE HYDROCHLORIDE 20 MG/ML
JELLY TOPICAL ONCE
OUTPATIENT
Start: 2024-10-22 | End: 2024-10-22

## 2024-10-22 RX ORDER — CLOBETASOL PROPIONATE 0.5 MG/G
OINTMENT TOPICAL ONCE
OUTPATIENT
Start: 2024-10-22 | End: 2024-10-22

## 2024-10-22 RX ORDER — BACITRACIN ZINC 500 [USP'U]/G
OINTMENT TOPICAL ONCE
OUTPATIENT
Start: 2024-10-22 | End: 2024-10-22

## 2024-10-22 RX ORDER — LIDOCAINE 40 MG/G
CREAM TOPICAL ONCE
OUTPATIENT
Start: 2024-10-22 | End: 2024-10-22

## 2024-10-22 RX ORDER — BACITRACIN ZINC AND POLYMYXIN B SULFATE 500; 1000 [USP'U]/G; [USP'U]/G
OINTMENT TOPICAL ONCE
OUTPATIENT
Start: 2024-10-22 | End: 2024-10-22

## 2024-10-22 RX ORDER — LIDOCAINE HYDROCHLORIDE 40 MG/ML
SOLUTION TOPICAL ONCE
OUTPATIENT
Start: 2024-10-22 | End: 2024-10-22

## 2024-10-22 RX ORDER — TRIAMCINOLONE ACETONIDE 1 MG/G
OINTMENT TOPICAL ONCE
OUTPATIENT
Start: 2024-10-22 | End: 2024-10-22

## 2024-10-22 RX ORDER — MUPIROCIN 20 MG/G
OINTMENT TOPICAL ONCE
OUTPATIENT
Start: 2024-10-22 | End: 2024-10-22

## 2024-10-22 RX ORDER — BETAMETHASONE DIPROPIONATE 0.5 MG/G
CREAM TOPICAL ONCE
OUTPATIENT
Start: 2024-10-22 | End: 2024-10-22

## 2024-10-22 RX ORDER — SILVER SULFADIAZINE 10 MG/G
CREAM TOPICAL ONCE
OUTPATIENT
Start: 2024-10-22 | End: 2024-10-22

## 2024-10-22 RX ORDER — SODIUM CHLOR/HYPOCHLOROUS ACID 0.033 %
SOLUTION, IRRIGATION IRRIGATION ONCE
OUTPATIENT
Start: 2024-10-22 | End: 2024-10-22

## 2024-10-22 RX ORDER — NEOMYCIN/BACITRACIN/POLYMYXINB 3.5-400-5K
OINTMENT (GRAM) TOPICAL ONCE
OUTPATIENT
Start: 2024-10-22 | End: 2024-10-22

## 2024-10-22 RX ORDER — GENTAMICIN SULFATE 1 MG/G
OINTMENT TOPICAL ONCE
OUTPATIENT
Start: 2024-10-22 | End: 2024-10-22

## 2024-10-22 RX ADMIN — LIDOCAINE HYDROCHLORIDE 15 ML: 40 SOLUTION TOPICAL at 13:57

## 2024-10-22 ASSESSMENT — PAIN DESCRIPTION - LOCATION: LOCATION: LEG

## 2024-10-22 ASSESSMENT — PAIN DESCRIPTION - ONSET: ONSET: ON-GOING

## 2024-10-22 ASSESSMENT — PAIN DESCRIPTION - PAIN TYPE: TYPE: CHRONIC PAIN

## 2024-10-22 ASSESSMENT — PAIN SCALES - GENERAL: PAINLEVEL_OUTOF10: 6

## 2024-10-22 ASSESSMENT — PAIN DESCRIPTION - FREQUENCY: FREQUENCY: INTERMITTENT

## 2024-10-22 ASSESSMENT — PAIN DESCRIPTION - DESCRIPTORS: DESCRIPTORS: BURNING;DISCOMFORT

## 2024-10-22 ASSESSMENT — PAIN - FUNCTIONAL ASSESSMENT: PAIN_FUNCTIONAL_ASSESSMENT: PREVENTS OR INTERFERES SOME ACTIVE ACTIVITIES AND ADLS

## 2024-10-22 ASSESSMENT — PAIN DESCRIPTION - ORIENTATION: ORIENTATION: LEFT

## 2024-10-22 NOTE — PROGRESS NOTES
ABD;Alginate 10/15/24 1532   Wound Length (cm) 4.6 cm 10/22/24 1344   Wound Width (cm) 7.2 cm 10/22/24 1344   Wound Depth (cm) 0.3 cm 10/22/24 1344   Wound Surface Area (cm^2) 33.12 cm^2 10/22/24 1344   Change in Wound Size % (l*w) 22.33 10/22/24 1344   Wound Volume (cm^3) 9.936 cm^3 10/22/24 1344   Wound Healing % 42 10/22/24 1344   Post-Procedure Length (cm) 4.7 cm 10/22/24 1424   Post-Procedure Width (cm) 7.3 cm 10/22/24 1424   Post-Procedure Depth (cm) 0.4 cm 10/22/24 1424   Post-Procedure Surface Area (cm^2) 34.31 cm^2 10/22/24 1424   Post-Procedure Volume (cm^3) 13.724 cm^3 10/22/24 1424   Wound Assessment Fibrin;Pink/red;Slough 10/22/24 1344   Drainage Amount Moderate (25-50%) 10/22/24 1344   Drainage Description Serosanguinous;Yellow 10/22/24 1344   Odor None 10/22/24 1344   Iliana-wound Assessment Fragile;Maceration 10/22/24 1344   Number of days: 7       Wound 10/15/24 # 2 leg left distal (Active)   Wound Image   10/15/24 1408   Wound Etiology Traumatic 10/15/24 1408   Dressing Status New dressing applied 10/15/24 1532   Wound Cleansed Cleansed with saline 10/15/24 1532   Dressing/Treatment ABD;Alginate 10/15/24 1532   Wound Length (cm) 2.1 cm 10/22/24 1344   Wound Width (cm) 1.9 cm 10/22/24 1344   Wound Depth (cm) 0.1 cm 10/22/24 1344   Wound Surface Area (cm^2) 3.99 cm^2 10/22/24 1344   Change in Wound Size % (l*w) -33 10/22/24 1344   Wound Volume (cm^3) 0.399 cm^3 10/22/24 1344   Wound Healing % 34 10/22/24 1344   Post-Procedure Length (cm) 2.2 cm 10/22/24 1424   Post-Procedure Width (cm) 2 cm 10/22/24 1424   Post-Procedure Depth (cm) 0.2 cm 10/22/24 1424   Post-Procedure Surface Area (cm^2) 4.4 cm^2 10/22/24 1424   Post-Procedure Volume (cm^3) 0.88 cm^3 10/22/24 1424   Wound Assessment Fibrin;Pink/red 10/22/24 1344   Drainage Amount Moderate (25-50%) 10/22/24 1344   Drainage Description Serosanguinous 10/22/24 1344   Odor None 10/22/24 1344   Iliana-wound Assessment Maceration 10/22/24 1344   Number of

## 2024-10-22 NOTE — PLAN OF CARE
Problem: Pain  Goal: Verbalizes/displays adequate comfort level or baseline comfort level  Outcome: Progressing     Problem: Cognitive:  Goal: Knowledge of wound care  Description: Knowledge of wound care  Outcome: Completed  Goal: Understands risk factors for wounds  Description: Understands risk factors for wounds  Outcome: Completed     Problem: Wound:  Goal: Will show signs of wound healing; wound closure and no evidence of infection  Description: Will show signs of wound healing; wound closure and no evidence of infection  Outcome: Progressing     Problem: Venous:  Goal: Signs of wound healing will improve  Description: Signs of wound healing will improve  Outcome: Progressing     Problem: Falls - Risk of:  Goal: Will remain free from falls  Description: Will remain free from falls  Outcome: Progressing

## 2024-10-23 NOTE — DISCHARGE INSTRUCTIONS
Visit Discharge/Physician Orders     Discharge condition: Stable     Assessment of pain at discharge:     Anesthetic used:      Discharge to: Home     Left via:Private automobile     Accompanied by: accompanied by family     ECF/HHA: [facility] for wound care supplies     Dressing Orders:      To right arm wounds, cleanse with normal saline solution and apply adaptic and alginate on top, ABD pad and a dry dressing. Changed daily. No tape to the skin!     To left leg wounds, cleanse with normal saline solution and apply hydroferra blue pad and cover with ABD pad, unna boot and coban. Change weekly at the [facility].     Keep wrap dry at all times. If wrap becomes wet or falls 2 inches call [facility] ([phone])and may remove wrap and apply double tubigrip.     FOR NEXT WEEK, CUT WRAP OFF AT HOME ON TUESDAY AND CLEANSE WITH NORMAL SALINE, APPLY HYDROFERRA BLUE PAD AND COVER WITH ABD PAD AND ROLLED GAUZE. CHANGE EVERY OTHER DAY UNTIL SEEN AT [facility] AGAIN.     Treatment Orders:     FOLLOW NUTRITIOUS DIET. CHOOSE FOODS HIGH IN PROTEIN -CHICKEN- FISH-AND EGGS,  CHOOSE FOODS HIGH IN VITAMIN C.   MULTIVITAMIN DAILY.       [facility] followup visit _______2 week______________________  (Please note your next appointment above and if you are unable to keep, kindly give a 24 hour notice. Thank you.)     Physician signature:__________________________        If you experience any of the following, please call the Wound Care Center during business hours:     * Increase in Pain  * Temperature over 101  * Increase in drainage from your wound  * Drainage with a foul odor  * Bleeding  * Increase in swelling  * Need for compression bandage changes due to slippage, breakthrough drainage.     If you need medical attention outside of the business hours of the Wound Care Centers please contact your PCP or go to the nearest emergency room.

## 2024-10-29 ENCOUNTER — HOSPITAL ENCOUNTER (OUTPATIENT)
Dept: WOUND CARE | Age: 74
Discharge: HOME | End: 2024-10-29
Attending: SURGERY
Payer: MEDICARE

## 2024-10-29 VITALS
HEART RATE: 83 BPM | HEIGHT: 65 IN | DIASTOLIC BLOOD PRESSURE: 54 MMHG | BODY MASS INDEX: 19.49 KG/M2 | TEMPERATURE: 96.9 F | WEIGHT: 117 LBS | SYSTOLIC BLOOD PRESSURE: 114 MMHG | RESPIRATION RATE: 18 BRPM

## 2024-10-29 DIAGNOSIS — S51.811A SKIN TEAR OF FOREARM WITHOUT COMPLICATION, RIGHT, INITIAL ENCOUNTER: Primary | ICD-10-CM

## 2024-10-29 PROCEDURE — 11042 DBRDMT SUBQ TIS 1ST 20SQCM/<: CPT

## 2024-10-29 PROCEDURE — 97597 DBRDMT OPN WND 1ST 20 CM/<: CPT | Performed by: SURGERY

## 2024-10-29 RX ORDER — LIDOCAINE 40 MG/G
CREAM TOPICAL ONCE
OUTPATIENT
Start: 2024-10-29 | End: 2024-10-29

## 2024-10-29 RX ORDER — NEOMYCIN/BACITRACIN/POLYMYXINB 3.5-400-5K
OINTMENT (GRAM) TOPICAL ONCE
OUTPATIENT
Start: 2024-10-29 | End: 2024-10-29

## 2024-10-29 RX ORDER — BACITRACIN ZINC 500 [USP'U]/G
OINTMENT TOPICAL ONCE
OUTPATIENT
Start: 2024-10-29 | End: 2024-10-29

## 2024-10-29 RX ORDER — BACITRACIN ZINC AND POLYMYXIN B SULFATE 500; 1000 [USP'U]/G; [USP'U]/G
OINTMENT TOPICAL ONCE
OUTPATIENT
Start: 2024-10-29 | End: 2024-10-29

## 2024-10-29 RX ORDER — ACETAMINOPHEN 500 MG
1000 TABLET ORAL EVERY 6 HOURS PRN
COMMUNITY

## 2024-10-29 RX ORDER — SILVER SULFADIAZINE 10 MG/G
CREAM TOPICAL ONCE
OUTPATIENT
Start: 2024-10-29 | End: 2024-10-29

## 2024-10-29 RX ORDER — LIDOCAINE HYDROCHLORIDE 40 MG/ML
SOLUTION TOPICAL ONCE
OUTPATIENT
Start: 2024-10-29 | End: 2024-10-29

## 2024-10-29 RX ORDER — GENTAMICIN SULFATE 1 MG/G
OINTMENT TOPICAL ONCE
OUTPATIENT
Start: 2024-10-29 | End: 2024-10-29

## 2024-10-29 RX ORDER — CLOBETASOL PROPIONATE 0.5 MG/G
OINTMENT TOPICAL ONCE
OUTPATIENT
Start: 2024-10-29 | End: 2024-10-29

## 2024-10-29 RX ORDER — LIDOCAINE HYDROCHLORIDE 20 MG/ML
JELLY TOPICAL ONCE
OUTPATIENT
Start: 2024-10-29 | End: 2024-10-29

## 2024-10-29 RX ORDER — SODIUM CHLOR/HYPOCHLOROUS ACID 0.033 %
SOLUTION, IRRIGATION IRRIGATION ONCE
OUTPATIENT
Start: 2024-10-29 | End: 2024-10-29

## 2024-10-29 RX ORDER — MUPIROCIN 20 MG/G
OINTMENT TOPICAL ONCE
OUTPATIENT
Start: 2024-10-29 | End: 2024-10-29

## 2024-10-29 RX ORDER — TRIAMCINOLONE ACETONIDE 1 MG/G
OINTMENT TOPICAL ONCE
OUTPATIENT
Start: 2024-10-29 | End: 2024-10-29

## 2024-10-29 RX ORDER — LIDOCAINE 50 MG/G
OINTMENT TOPICAL ONCE
OUTPATIENT
Start: 2024-10-29 | End: 2024-10-29

## 2024-10-29 RX ORDER — OXYCODONE HYDROCHLORIDE 5 MG/1
5 TABLET ORAL EVERY 6 HOURS PRN
Qty: 20 TABLET | Refills: 0 | Status: SHIPPED | OUTPATIENT
Start: 2024-10-29 | End: 2024-11-05

## 2024-10-29 RX ORDER — LIDOCAINE HYDROCHLORIDE 40 MG/ML
SOLUTION TOPICAL ONCE
Status: COMPLETED | OUTPATIENT
Start: 2024-10-29 | End: 2024-10-29

## 2024-10-29 RX ORDER — BETAMETHASONE DIPROPIONATE 0.5 MG/G
CREAM TOPICAL ONCE
OUTPATIENT
Start: 2024-10-29 | End: 2024-10-29

## 2024-10-29 RX ADMIN — LIDOCAINE HYDROCHLORIDE: 40 SOLUTION TOPICAL at 13:05

## 2024-10-29 ASSESSMENT — PAIN DESCRIPTION - DESCRIPTORS: DESCRIPTORS: ACHING

## 2024-10-29 ASSESSMENT — PAIN DESCRIPTION - ORIENTATION: ORIENTATION: LEFT

## 2024-10-29 ASSESSMENT — PAIN SCALES - GENERAL: PAINLEVEL_OUTOF10: 8

## 2024-10-29 ASSESSMENT — PAIN DESCRIPTION - LOCATION: LOCATION: LEG

## 2024-10-29 NOTE — PLAN OF CARE
Problem: Pain  Goal: Verbalizes/displays adequate comfort level or baseline comfort level  Outcome: Completed     Problem: Wound:  Goal: Will show signs of wound healing; wound closure and no evidence of infection  Description: Will show signs of wound healing; wound closure and no evidence of infection  Outcome: Progressing     Problem: Venous:  Goal: Signs of wound healing will improve  Description: Signs of wound healing will improve  Outcome: Progressing     Problem: Falls - Risk of:  Goal: Will remain free from falls  Description: Will remain free from falls  Outcome: Progressing

## 2024-10-29 NOTE — PROGRESS NOTES
the removal of epidermis and dermis.        Devitalized Tissue Debrided:  biofilm and necrotic/eschar to stimulate bleeding to promote healing, post debridement good bleeding base and wound edges noted    Wound/Ulcer #: 2    Percent of Wound/Ulcer Debrided: 50%    Total Surface Area Debrided:  12 sq cm     Estimated Blood Loss:  None  Hemostasis Achieved:  not needed    Procedural Pain:  5  / 10   Post Procedural Pain:  6 / 10     Response to treatment:  Some pain during procedure     Plan:   Treatment Note please see attached Discharge Instructions    Written patient dismissal instructions given to patient and signed by patient or POA.         Discharge Instructions         Visit Discharge/Physician Orders     Discharge condition: Stable     Assessment of pain at discharge:     Anesthetic used:      Discharge to: Home     Left via:Private automobile     Accompanied by: accompanied by family     ECF/HHA: [facility] for wound care supplies     Dressing Orders:      To right arm wounds, cleanse with normal saline solution and apply adaptic and alginate on top, ABD pad and a dry dressing. Changed daily. No tape to the skin!     To left leg wounds, cleanse with normal saline solution and apply hydroferra blue pad and cover with ABD pad, unna boot and coban. Change weekly at the [facility].     Keep wrap dry at all times. If wrap becomes wet or falls 2 inches call [facility] ([phone])and may remove wrap and apply double tubigrip.     FOR NEXT WEEK, CUT WRAP OFF AT HOME ON TUESDAY AND CLEANSE WITH NORMAL SALINE, APPLY HYDROFERRA BLUE PAD AND COVER WITH ABD PAD AND ROLLED GAUZE. CHANGE EVERY OTHER DAY UNTIL SEEN AT [facility] AGAIN.     Treatment Orders:     FOLLOW NUTRITIOUS DIET. CHOOSE FOODS HIGH IN PROTEIN -CHICKEN- FISH-AND EGGS,  CHOOSE FOODS HIGH IN VITAMIN C.   MULTIVITAMIN DAILY.       [facility] followup visit _______2 week______________________  (Please note your next appointment above and if you are unable to keep, kindly give a 24 hour

## 2024-11-06 NOTE — DISCHARGE INSTRUCTIONS
Visit Discharge/Physician Orders     Discharge condition: Stable     Assessment of pain at discharge:     Anesthetic used:      Discharge to: Home     Left via:Private automobile     Accompanied by: accompanied by family     ECF/HHA: [facility] for wound care supplies     Dressing Orders:      To right arm wounds, cleanse with normal saline solution and apply adaptic and alginate on top, ABD pad and a dry dressing. Changed daily. No tape to the skin!     To left leg wound, cleanse with normal saline solution and apply hydroferra blue pad and cover with ABD pad, unna boot and coban. Change weekly at the [facility].     Keep wrap dry at all times. If wrap becomes wet or falls 2 inches call [facility] ([phone])and may remove wrap and apply double tubigrip.      Left distal leg wound healed    Treatment Orders:     FOLLOW NUTRITIOUS DIET. CHOOSE FOODS HIGH IN PROTEIN -CHICKEN- FISH-AND EGGS,  CHOOSE FOODS HIGH IN VITAMIN C.   MULTIVITAMIN DAILY.       [facility] followup visit _______2 week______________________  (Please note your next appointment above and if you are unable to keep, kindly give a 24 hour notice. Thank you.)     Physician signature:__________________________        If you experience any of the following, please call the Wound Care Center during business hours:     * Increase in Pain  * Temperature over 101  * Increase in drainage from your wound  * Drainage with a foul odor  * Bleeding  * Increase in swelling  * Need for compression bandage changes due to slippage, breakthrough drainage.     If you need medical attention outside of the business hours of the Wound Care Centers please contact your PCP or go to the nearest emergency room.

## 2024-11-12 ENCOUNTER — HOSPITAL ENCOUNTER (OUTPATIENT)
Dept: WOUND CARE | Age: 74
Discharge: HOME | End: 2024-11-12
Attending: SURGERY
Payer: MEDICARE

## 2024-11-12 VITALS
HEART RATE: 88 BPM | DIASTOLIC BLOOD PRESSURE: 57 MMHG | RESPIRATION RATE: 18 BRPM | WEIGHT: 117 LBS | BODY MASS INDEX: 19.49 KG/M2 | TEMPERATURE: 97 F | HEIGHT: 65 IN | SYSTOLIC BLOOD PRESSURE: 105 MMHG

## 2024-11-12 DIAGNOSIS — S51.811A SKIN TEAR OF FOREARM WITHOUT COMPLICATION, RIGHT, INITIAL ENCOUNTER: Primary | ICD-10-CM

## 2024-11-12 PROCEDURE — 97597 DBRDMT OPN WND 1ST 20 CM/<: CPT

## 2024-11-12 PROCEDURE — 11042 DBRDMT SUBQ TIS 1ST 20SQCM/<: CPT

## 2024-11-12 RX ORDER — LIDOCAINE HYDROCHLORIDE 20 MG/ML
JELLY TOPICAL ONCE
OUTPATIENT
Start: 2024-11-12 | End: 2024-11-12

## 2024-11-12 RX ORDER — SODIUM CHLOR/HYPOCHLOROUS ACID 0.033 %
SOLUTION, IRRIGATION IRRIGATION ONCE
OUTPATIENT
Start: 2024-11-12 | End: 2024-11-12

## 2024-11-12 RX ORDER — CLOBETASOL PROPIONATE 0.5 MG/G
OINTMENT TOPICAL ONCE
OUTPATIENT
Start: 2024-11-12 | End: 2024-11-12

## 2024-11-12 RX ORDER — GENTAMICIN SULFATE 1 MG/G
OINTMENT TOPICAL ONCE
OUTPATIENT
Start: 2024-11-12 | End: 2024-11-12

## 2024-11-12 RX ORDER — BETAMETHASONE DIPROPIONATE 0.5 MG/G
CREAM TOPICAL ONCE
OUTPATIENT
Start: 2024-11-12 | End: 2024-11-12

## 2024-11-12 RX ORDER — MUPIROCIN 20 MG/G
OINTMENT TOPICAL ONCE
OUTPATIENT
Start: 2024-11-12 | End: 2024-11-12

## 2024-11-12 RX ORDER — TRIAMCINOLONE ACETONIDE 1 MG/G
OINTMENT TOPICAL ONCE
OUTPATIENT
Start: 2024-11-12 | End: 2024-11-12

## 2024-11-12 RX ORDER — BACITRACIN ZINC 500 [USP'U]/G
OINTMENT TOPICAL ONCE
OUTPATIENT
Start: 2024-11-12 | End: 2024-11-12

## 2024-11-12 RX ORDER — LIDOCAINE HYDROCHLORIDE 40 MG/ML
SOLUTION TOPICAL ONCE
OUTPATIENT
Start: 2024-11-12 | End: 2024-11-12

## 2024-11-12 RX ORDER — LIDOCAINE 40 MG/G
CREAM TOPICAL ONCE
OUTPATIENT
Start: 2024-11-12 | End: 2024-11-12

## 2024-11-12 RX ORDER — SILVER SULFADIAZINE 10 MG/G
CREAM TOPICAL ONCE
OUTPATIENT
Start: 2024-11-12 | End: 2024-11-12

## 2024-11-12 RX ORDER — LIDOCAINE HYDROCHLORIDE 40 MG/ML
SOLUTION TOPICAL ONCE
Status: COMPLETED | OUTPATIENT
Start: 2024-11-12 | End: 2024-11-12

## 2024-11-12 RX ORDER — NEOMYCIN/BACITRACIN/POLYMYXINB 3.5-400-5K
OINTMENT (GRAM) TOPICAL ONCE
OUTPATIENT
Start: 2024-11-12 | End: 2024-11-12

## 2024-11-12 RX ORDER — LIDOCAINE 50 MG/G
OINTMENT TOPICAL ONCE
OUTPATIENT
Start: 2024-11-12 | End: 2024-11-12

## 2024-11-12 RX ORDER — BACITRACIN ZINC AND POLYMYXIN B SULFATE 500; 1000 [USP'U]/G; [USP'U]/G
OINTMENT TOPICAL ONCE
OUTPATIENT
Start: 2024-11-12 | End: 2024-11-12

## 2024-11-12 RX ADMIN — LIDOCAINE HYDROCHLORIDE 10 ML: 40 SOLUTION TOPICAL at 13:30

## 2024-11-12 NOTE — PLAN OF CARE
Problem: Wound:  Goal: Will show signs of wound healing; wound closure and no evidence of infection  Description: Will show signs of wound healing; wound closure and no evidence of infection  Outcome: Progressing     Problem: Venous:  Goal: Signs of wound healing will improve  Description: Signs of wound healing will improve  Outcome: Progressing     Problem: Falls - Risk of:  Goal: Will remain free from falls  Description: Will remain free from falls  Outcome: Progressing

## 2024-11-12 NOTE — PROGRESS NOTES
acetaminophen (TYLENOL) 500 MG tablet Take 2 tablets by mouth every 6 hours as needed for Pain      torsemide (DEMADEX) 20 MG tablet Take 1 tablet by mouth every morning      metoprolol succinate (TOPROL XL) 100 MG extended release tablet Take 1 tablet by mouth every morning      TRELEGY ELLIPTA 100-62.5-25 MCG/ACT AEPB inhaler Inhale 1 puff into the lungs every morning      spironolactone (ALDACTONE) 25 MG tablet Take 1 tablet by mouth every morning      metoprolol tartrate (LOPRESSOR) 25 MG tablet Take 1 tablet by mouth 2 times daily 60 tablet 0    ELIQUIS 2.5 MG TABS tablet Take 1 tablet by mouth 2 times daily      amiodarone (CORDARONE) 200 MG tablet Take 1 tablet by mouth every morning      albuterol sulfate HFA (VENTOLIN HFA) 108 (90 Base) MCG/ACT inhaler Inhale 2 puffs into the lungs 4 times daily as needed for Wheezing 3 Inhaler 1    atorvastatin (LIPITOR) 20 MG tablet Take 1 tablet by mouth every morning       No current facility-administered medications on file prior to encounter.       REVIEW OF SYSTEMS See HPI    Objective:    BP (!) 105/57   Pulse 88   Temp 97 °F (36.1 °C) (Temporal)   Resp 18   Ht 1.651 m (5' 5\")   Wt 53.1 kg (117 lb)   BMI 19.47 kg/m²   Wt Readings from Last 3 Encounters:   11/12/24 53.1 kg (117 lb)   10/29/24 53.1 kg (117 lb)   10/22/24 53.1 kg (117 lb)     PHYSICAL EXAM  CONSTITUTIONAL:   Awake, alert, cooperative   EYES:  lids and lashes normal   ENT: external ears and nose without lesions   NECK:  supple, symmetrical, trachea midline   SKIN:  Open wound Present    Assessment:     Problem List Items Addressed This Visit          Other    * (Principal) Skin tear of forearm without complication, right, initial encounter - Primary    Relevant Orders    Initiate Outpatient Wound Care Protocol       Pre Debridement Measurements:  Are located in the Wound/Ulcer Documentation Flow Sheet  Post Debridement Measurements:  Wound/Ulcer Descriptions are Pre Debridement except

## 2024-11-14 NOTE — DISCHARGE INSTRUCTIONS
Visit Discharge/Physician Orders     Discharge condition: Stable     Assessment of pain at discharge: yes     Anesthetic used: 4% lidocaine external     Discharge to: Home     Left via:Private automobile     Accompanied by: accompanied by family     ECF/HHA: [facility] for wound care supplies     Dressing Orders:      To right arm wounds, cleanse with normal saline solution and apply adaptic and alginate on top, ABD pad and a dry dressing. Changed daily. No tape to the skin!     To left leg wound, cleanse with normal saline solution and apply hydroferra blue pad and cover with ABD pad, unna boot and coban. Change weekly at the [facility].     Keep wrap dry at all times. If wrap becomes wet or falls 2 inches call [facility] ([phone])and may remove wrap and apply double tubigrip.      Left distal leg wound healed     Treatment Orders:     FOLLOW NUTRITIOUS DIET. CHOOSE FOODS HIGH IN PROTEIN -CHICKEN- FISH-AND EGGS,  CHOOSE FOODS HIGH IN VITAMIN C.   MULTIVITAMIN DAILY.       [facility] followup visit _______2 week______________________  (Please note your next appointment above and if you are unable to keep, kindly give a 24 hour notice. Thank you.)     Physician signature:__________________________        If you experience any of the following, please call the Wound Care Center during business hours:     * Increase in Pain  * Temperature over 101  * Increase in drainage from your wound  * Drainage with a foul odor  * Bleeding  * Increase in swelling  * Need for compression bandage changes due to slippage, breakthrough drainage.     If you need medical attention outside of the business hours of the Wound Care Centers please contact your PCP or go to the nearest emergency room.

## 2024-11-19 ENCOUNTER — HOSPITAL ENCOUNTER (OUTPATIENT)
Dept: WOUND CARE | Age: 74
Discharge: HOME | End: 2024-11-19
Attending: SURGERY
Payer: MEDICARE

## 2024-11-19 VITALS
HEIGHT: 65 IN | RESPIRATION RATE: 18 BRPM | DIASTOLIC BLOOD PRESSURE: 60 MMHG | SYSTOLIC BLOOD PRESSURE: 108 MMHG | HEART RATE: 84 BPM | WEIGHT: 117 LBS | BODY MASS INDEX: 19.49 KG/M2 | TEMPERATURE: 97.3 F

## 2024-11-19 DIAGNOSIS — L97.929 VENOUS ULCER OF LEFT LEG (HCC): ICD-10-CM

## 2024-11-19 DIAGNOSIS — S51.811A SKIN TEAR OF FOREARM WITHOUT COMPLICATION, RIGHT, INITIAL ENCOUNTER: Primary | ICD-10-CM

## 2024-11-19 DIAGNOSIS — I83.029 VENOUS ULCER OF LEFT LEG (HCC): ICD-10-CM

## 2024-11-19 PROBLEM — I83.023 VENOUS STASIS ULCER OF ANKLE, LEFT (HCC): Status: ACTIVE | Noted: 2024-11-19

## 2024-11-19 PROBLEM — L97.329 VENOUS STASIS ULCER OF ANKLE, LEFT (HCC): Status: ACTIVE | Noted: 2024-11-19

## 2024-11-19 PROCEDURE — 11042 DBRDMT SUBQ TIS 1ST 20SQCM/<: CPT

## 2024-11-19 RX ORDER — BACITRACIN ZINC 500 [USP'U]/G
OINTMENT TOPICAL ONCE
OUTPATIENT
Start: 2024-11-19 | End: 2024-11-19

## 2024-11-19 RX ORDER — MUPIROCIN 20 MG/G
OINTMENT TOPICAL ONCE
OUTPATIENT
Start: 2024-11-19 | End: 2024-11-19

## 2024-11-19 RX ORDER — BACITRACIN ZINC AND POLYMYXIN B SULFATE 500; 1000 [USP'U]/G; [USP'U]/G
OINTMENT TOPICAL ONCE
OUTPATIENT
Start: 2024-11-19 | End: 2024-11-19

## 2024-11-19 RX ORDER — NEOMYCIN/BACITRACIN/POLYMYXINB 3.5-400-5K
OINTMENT (GRAM) TOPICAL ONCE
OUTPATIENT
Start: 2024-11-19 | End: 2024-11-19

## 2024-11-19 RX ORDER — LIDOCAINE 40 MG/G
CREAM TOPICAL ONCE
OUTPATIENT
Start: 2024-11-19 | End: 2024-11-19

## 2024-11-19 RX ORDER — LIDOCAINE HYDROCHLORIDE 40 MG/ML
SOLUTION TOPICAL ONCE
Status: COMPLETED | OUTPATIENT
Start: 2024-11-19 | End: 2024-11-19

## 2024-11-19 RX ORDER — BETAMETHASONE DIPROPIONATE 0.5 MG/G
CREAM TOPICAL ONCE
OUTPATIENT
Start: 2024-11-19 | End: 2024-11-19

## 2024-11-19 RX ORDER — GENTAMICIN SULFATE 1 MG/G
OINTMENT TOPICAL ONCE
OUTPATIENT
Start: 2024-11-19 | End: 2024-11-19

## 2024-11-19 RX ORDER — TRIAMCINOLONE ACETONIDE 1 MG/G
OINTMENT TOPICAL ONCE
OUTPATIENT
Start: 2024-11-19 | End: 2024-11-19

## 2024-11-19 RX ORDER — LIDOCAINE 50 MG/G
OINTMENT TOPICAL ONCE
OUTPATIENT
Start: 2024-11-19 | End: 2024-11-19

## 2024-11-19 RX ORDER — LIDOCAINE HYDROCHLORIDE 20 MG/ML
JELLY TOPICAL ONCE
OUTPATIENT
Start: 2024-11-19 | End: 2024-11-19

## 2024-11-19 RX ORDER — CLOBETASOL PROPIONATE 0.5 MG/G
OINTMENT TOPICAL ONCE
OUTPATIENT
Start: 2024-11-19 | End: 2024-11-19

## 2024-11-19 RX ORDER — SILVER SULFADIAZINE 10 MG/G
CREAM TOPICAL ONCE
OUTPATIENT
Start: 2024-11-19 | End: 2024-11-19

## 2024-11-19 RX ORDER — SODIUM CHLOR/HYPOCHLOROUS ACID 0.033 %
SOLUTION, IRRIGATION IRRIGATION ONCE
OUTPATIENT
Start: 2024-11-19 | End: 2024-11-19

## 2024-11-19 RX ORDER — LIDOCAINE HYDROCHLORIDE 40 MG/ML
SOLUTION TOPICAL ONCE
OUTPATIENT
Start: 2024-11-19 | End: 2024-11-19

## 2024-11-19 RX ADMIN — LIDOCAINE HYDROCHLORIDE 20 ML: 40 SOLUTION TOPICAL at 13:46

## 2024-11-19 NOTE — PROGRESS NOTES
[facility] Center Followup Visit Note    Referring Physician : Nu Lopez DO  Nicki Javed  MEDICAL RECORD NUMBER:  [id number]  AGE: 74 y.o.   GENDER: female  : 1950  EPISODE DATE:  2024    Subjective:     Chief Complaint   Patient presents with    Wound Check     Left pretib/right arm      HISTORY of PRESENT ILLNESS HPI   Nicki Javed is a 74 y.o. female who presents today in regards to follow up evaluation and treatment of wound/ulcer.  That patient's past medical, family and social hx were reviewed and changes were made if present.    History of Wound Context:  74 y female s/p fall. She is on eliquis. She fell about 2 weeks ago and hit her leg on a commode. She also sustained an arm wound from trying to support herself.      Wound/Ulcer Pain Timing/Severity: intermittent  Quality of pain: sharp  Severity:  7 / 10   Modifying Factors: Pain worsens with manipulation  Associated Signs/Symptoms: edema and drainage    10/15/24 Unna boot and alginate to left leg to control edema. Adaptic and alginate to arm with loose kerlex wrap. Will check in 1 week.     10/22/24 Wounds all improved. Did not get adaptic. Will continue adaptic/alginate to arm wound. Left leg wound will place hydrofera and unna boot.     10/29/24 Wounds all improved. Leg has much more granluation tissue; she is still having a great deal of pain. Will send some pain medication for her. Arm nearly healed. Will see in 2 weeks.     24 Wound 2 healed.  Other wounds improving.  Will continue current care.    24 Wound of right forearm almost healed. Left leg wound very slightly larger but healthy appearing tissue. Continue current.    Ulcer Identification:  Ulcer Type: traumatic  Contributing Factors: shear force    Diabetic/Pressure/Non Pressure Ulcers only:  Ulcer: Non-Pressure ulcer, fat layer exposed    Wound: Laceration        PAST MEDICAL HISTORY      Diagnosis Date    Atrial fibrillation (HCC)

## 2024-11-20 NOTE — DISCHARGE INSTRUCTIONS
Visit Discharge/Physician Orders     Discharge condition: Stable     Assessment of pain at discharge: yes     Anesthetic used: 4% lidocaine external     Discharge to: Home     Left via:Private automobile     Accompanied by: accompanied by family     [facility]: [facility] for wound care supplies     Dressing Orders:      To right arm wounds- Cleanse with saline, apply adaptic and silicone bordered dressing. Leave on for 5 days. Once removed wound should be healed.     To left leg wound- healed, keep area clean and dry.      Keep wrap dry at all times. If wrap becomes wet or falls 2 inches call [facility] ([phone])and may remove wrap and apply double tubigrip.      Treatment Orders:     FOLLOW NUTRITIOUS DIET. CHOOSE FOODS HIGH IN PROTEIN -CHICKEN- FISH-AND EGGS,  CHOOSE FOODS HIGH IN VITAMIN C.   MULTIVITAMIN DAILY.       [facility] followup visit _____call if needed_____________________  (Please note your next appointment above and if you are unable to keep, kindly give a 24 hour notice. Thank you.)     Physician signature:__________________________        If you experience any of the following, please call the Wound Care Center during business hours:     * Increase in Pain  * Temperature over 101  * Increase in drainage from your wound  * Drainage with a foul odor  * Bleeding  * Increase in swelling  * Need for compression bandage changes due to slippage, breakthrough drainage.     If you need medical attention outside of the business hours of the Wound Care Centers please contact your PCP or go to the nearest emergency room.

## 2024-11-26 ENCOUNTER — HOSPITAL ENCOUNTER (OUTPATIENT)
Dept: WOUND CARE | Age: 74
Discharge: HOME | End: 2024-11-26
Attending: SURGERY
Payer: MEDICARE

## 2024-11-26 VITALS
WEIGHT: 117 LBS | RESPIRATION RATE: 17 BRPM | HEIGHT: 65 IN | TEMPERATURE: 96.9 F | SYSTOLIC BLOOD PRESSURE: 98 MMHG | HEART RATE: 66 BPM | BODY MASS INDEX: 19.49 KG/M2 | DIASTOLIC BLOOD PRESSURE: 51 MMHG

## 2024-11-26 DIAGNOSIS — S51.811A SKIN TEAR OF FOREARM WITHOUT COMPLICATION, RIGHT, INITIAL ENCOUNTER: Primary | ICD-10-CM

## 2024-11-26 PROCEDURE — 99212 OFFICE O/P EST SF 10 MIN: CPT

## 2024-11-26 PROCEDURE — 99212 OFFICE O/P EST SF 10 MIN: CPT | Performed by: SURGERY

## 2024-11-26 RX ORDER — BETAMETHASONE DIPROPIONATE 0.5 MG/G
CREAM TOPICAL ONCE
OUTPATIENT
Start: 2024-11-26 | End: 2024-11-26

## 2024-11-26 RX ORDER — LIDOCAINE HYDROCHLORIDE 20 MG/ML
JELLY TOPICAL ONCE
OUTPATIENT
Start: 2024-11-26 | End: 2024-11-26

## 2024-11-26 RX ORDER — LIDOCAINE 40 MG/G
CREAM TOPICAL ONCE
OUTPATIENT
Start: 2024-11-26 | End: 2024-11-26

## 2024-11-26 RX ORDER — SODIUM CHLOR/HYPOCHLOROUS ACID 0.033 %
SOLUTION, IRRIGATION IRRIGATION ONCE
OUTPATIENT
Start: 2024-11-26 | End: 2024-11-26

## 2024-11-26 RX ORDER — SILVER SULFADIAZINE 10 MG/G
CREAM TOPICAL ONCE
OUTPATIENT
Start: 2024-11-26 | End: 2024-11-26

## 2024-11-26 RX ORDER — LIDOCAINE HYDROCHLORIDE 40 MG/ML
SOLUTION TOPICAL ONCE
Status: COMPLETED | OUTPATIENT
Start: 2024-11-26 | End: 2024-11-26

## 2024-11-26 RX ORDER — BACITRACIN ZINC 500 [USP'U]/G
OINTMENT TOPICAL ONCE
OUTPATIENT
Start: 2024-11-26 | End: 2024-11-26

## 2024-11-26 RX ORDER — MUPIROCIN 20 MG/G
OINTMENT TOPICAL ONCE
OUTPATIENT
Start: 2024-11-26 | End: 2024-11-26

## 2024-11-26 RX ORDER — TRIAMCINOLONE ACETONIDE 1 MG/G
OINTMENT TOPICAL ONCE
OUTPATIENT
Start: 2024-11-26 | End: 2024-11-26

## 2024-11-26 RX ORDER — GENTAMICIN SULFATE 1 MG/G
OINTMENT TOPICAL ONCE
OUTPATIENT
Start: 2024-11-26 | End: 2024-11-26

## 2024-11-26 RX ORDER — LIDOCAINE 50 MG/G
OINTMENT TOPICAL ONCE
OUTPATIENT
Start: 2024-11-26 | End: 2024-11-26

## 2024-11-26 RX ORDER — CLOBETASOL PROPIONATE 0.5 MG/G
OINTMENT TOPICAL ONCE
OUTPATIENT
Start: 2024-11-26 | End: 2024-11-26

## 2024-11-26 RX ORDER — NEOMYCIN/BACITRACIN/POLYMYXINB 3.5-400-5K
OINTMENT (GRAM) TOPICAL ONCE
OUTPATIENT
Start: 2024-11-26 | End: 2024-11-26

## 2024-11-26 RX ORDER — BACITRACIN ZINC AND POLYMYXIN B SULFATE 500; 1000 [USP'U]/G; [USP'U]/G
OINTMENT TOPICAL ONCE
OUTPATIENT
Start: 2024-11-26 | End: 2024-11-26

## 2024-11-26 RX ORDER — LIDOCAINE HYDROCHLORIDE 40 MG/ML
SOLUTION TOPICAL ONCE
OUTPATIENT
Start: 2024-11-26 | End: 2024-11-26

## 2024-11-26 RX ADMIN — LIDOCAINE HYDROCHLORIDE 7 ML: 40 SOLUTION TOPICAL at 13:28

## 2024-11-26 NOTE — PROGRESS NOTES
Wound Healing Center Followup Visit Note    Referring Physician : Nu Lopez DO  Nicki Javed  MEDICAL RECORD NUMBER:  [id number]  AGE: 74 y.o.   GENDER: female  : 1950  EPISODE DATE:  2024    Subjective:     Chief Complaint   Patient presents with    Wound Check     Left leg and right arm      HISTORY of PRESENT ILLNESS HPI   Nicki Javed is a 74 y.o. female who presents today in regards to follow up evaluation and treatment of wound/ulcer.  That patient's past medical, family and social hx were reviewed and changes were made if present.    History of Wound Context:  74 y female s/p fall. She is on eliquis. She fell about 2 weeks ago and hit her leg on a commode. She also sustained an arm wound from trying to support herself.      Wound/Ulcer Pain Timing/Severity: intermittent  Quality of pain: sharp  Severity:   10   Modifying Factors: Pain worsens with manipulation  Associated Signs/Symptoms: edema and drainage    10/15/24 Unna boot and alginate to left leg to control edema. Adaptic and alginate to arm with loose kerlex wrap. Will check in 1 week.     10/22/24 Wounds all improved. Did not get adaptic. Will continue adaptic/alginate to arm wound. Left leg wound will place hydrofera and unna boot.     10/29/24 Wounds all improved. Leg has much more granluation tissue; she is still having a great deal of pain. Will send some pain medication for her. Arm nearly healed. Will see in 2 weeks.     24 Wound 2 healed.  Other wounds improving.  Will continue current care.    24 Wound of right forearm almost healed. Left leg wound very slightly larger but healthy appearing tissue. Continue current.    24 Leg woudn healed, arm wound epithelialized. Will cover arm wound for one more week to protect. Otherwise will call back as needed.     Ulcer Identification:  Ulcer Type: traumatic  Contributing Factors: shear force    Diabetic/Pressure/Non Pressure Ulcers only:  Ulcer:

## 2025-02-25 ENCOUNTER — OFFICE VISIT (OUTPATIENT)
Dept: FAMILY MEDICINE CLINIC | Age: 75
End: 2025-02-25
Payer: MEDICARE

## 2025-02-25 VITALS
WEIGHT: 120 LBS | HEART RATE: 82 BPM | OXYGEN SATURATION: 97 % | BODY MASS INDEX: 19.99 KG/M2 | TEMPERATURE: 97.8 F | RESPIRATION RATE: 16 BRPM | SYSTOLIC BLOOD PRESSURE: 122 MMHG | DIASTOLIC BLOOD PRESSURE: 78 MMHG | HEIGHT: 65 IN

## 2025-02-25 DIAGNOSIS — I50.33 ACUTE ON CHRONIC DIASTOLIC (CONGESTIVE) HEART FAILURE (HCC): ICD-10-CM

## 2025-02-25 DIAGNOSIS — Z00.00 MEDICARE ANNUAL WELLNESS VISIT, SUBSEQUENT: Primary | ICD-10-CM

## 2025-02-25 DIAGNOSIS — I48.0 PAROXYSMAL ATRIAL FIBRILLATION (HCC): ICD-10-CM

## 2025-02-25 DIAGNOSIS — I83.023 VENOUS STASIS ULCER OF ANKLE, LEFT (HCC): ICD-10-CM

## 2025-02-25 DIAGNOSIS — C34.91 ADENOCARCINOMA OF RIGHT LUNG (HCC): ICD-10-CM

## 2025-02-25 DIAGNOSIS — J43.9 PULMONARY EMPHYSEMA, UNSPECIFIED EMPHYSEMA TYPE (HCC): ICD-10-CM

## 2025-02-25 DIAGNOSIS — L97.329 VENOUS STASIS ULCER OF ANKLE, LEFT (HCC): ICD-10-CM

## 2025-02-25 PROCEDURE — 1159F MED LIST DOCD IN RCRD: CPT | Performed by: FAMILY MEDICINE

## 2025-02-25 PROCEDURE — 3074F SYST BP LT 130 MM HG: CPT | Performed by: FAMILY MEDICINE

## 2025-02-25 PROCEDURE — 3078F DIAST BP <80 MM HG: CPT | Performed by: FAMILY MEDICINE

## 2025-02-25 PROCEDURE — G0439 PPPS, SUBSEQ VISIT: HCPCS | Performed by: FAMILY MEDICINE

## 2025-02-25 PROCEDURE — 1124F ACP DISCUSS-NO DSCNMKR DOCD: CPT | Performed by: FAMILY MEDICINE

## 2025-02-25 SDOH — ECONOMIC STABILITY: FOOD INSECURITY: WITHIN THE PAST 12 MONTHS, YOU WORRIED THAT YOUR FOOD WOULD RUN OUT BEFORE YOU GOT MONEY TO BUY MORE.: NEVER TRUE

## 2025-02-25 SDOH — ECONOMIC STABILITY: FOOD INSECURITY: WITHIN THE PAST 12 MONTHS, THE FOOD YOU BOUGHT JUST DIDN'T LAST AND YOU DIDN'T HAVE MONEY TO GET MORE.: NEVER TRUE

## 2025-02-25 ASSESSMENT — PATIENT HEALTH QUESTIONNAIRE - PHQ9
SUM OF ALL RESPONSES TO PHQ QUESTIONS 1-9: 0
2. FEELING DOWN, DEPRESSED OR HOPELESS: NOT AT ALL
SUM OF ALL RESPONSES TO PHQ QUESTIONS 1-9: 0

## 2025-02-25 NOTE — PROGRESS NOTES
Medicare Annual Wellness Visit    Nicki Javed is here for Medicare AWV (Would like medication for gas )    Assessment & Plan      Medicare annual wellness visit, subsequent  Adenocarcinoma of right lung (HCC)  Venous stasis ulcer of ankle, left (HCC)  Pulmonary emphysema, unspecified emphysema type (HCC)  Paroxysmal atrial fibrillation (HCC)  Acute on chronic diastolic (congestive) heart failure (HCC)  Results         Return for Medicare Annual Wellness Visit in 1 year.     Subjective     History of Present Illness        Patient's complete Health Risk Assessment and screening values have been reviewed and are found in Flowsheets. The following problems were reviewed today and where indicated follow up appointments were made and/or referrals ordered.    Positive Risk Factor Screenings with Interventions:    Fall Risk:  Do you feel unsteady or are you worried about falling? : (!) yes  2 or more falls in past year?: no  Fall with injury in past year?: no     Interventions:    Reviewed medications, home hazards, visual acuity, and co-morbidities that can increase risk for falls             Inactivity:  On average, how many days per week do you engage in moderate to strenuous exercise (like a brisk walk)?: 0 days (!) Abnormal  On average, how many minutes do you engage in exercise at this level?: 0 min  Interventions:  Patient declined any further interventions or treatment         Safety:  Do you have non-slip mats or non-slip surfaces or shower bars or grab bars in your shower or bathtub?: (!) No  Interventions:  Patient declined any further interventions or treatment                 Objective   Vitals:    02/25/25 1133   BP: 122/78   Site: Left Upper Arm   Position: Sitting   Cuff Size: Medium Adult   Pulse: 82   Resp: 16   Temp: 97.8 °F (36.6 °C)   TempSrc: Temporal   SpO2: 97%   Weight: 54.4 kg (120 lb)   Height: 1.651 m (5' 5\")      Body mass index is 19.97 kg/m².        Physical Exam     General

## 2025-02-26 ENCOUNTER — TELEPHONE (OUTPATIENT)
Dept: FAMILY MEDICINE CLINIC | Age: 75
End: 2025-02-26

## 2025-02-26 DIAGNOSIS — R26.89 BALANCE PROBLEM: Primary | ICD-10-CM

## 2025-03-12 ENCOUNTER — OFFICE VISIT (OUTPATIENT)
Dept: FAMILY MEDICINE CLINIC | Age: 75
End: 2025-03-12
Payer: MEDICARE

## 2025-03-12 VITALS
TEMPERATURE: 97.2 F | WEIGHT: 120.8 LBS | SYSTOLIC BLOOD PRESSURE: 120 MMHG | RESPIRATION RATE: 18 BRPM | OXYGEN SATURATION: 92 % | HEART RATE: 60 BPM | HEIGHT: 65 IN | DIASTOLIC BLOOD PRESSURE: 60 MMHG | BODY MASS INDEX: 20.12 KG/M2

## 2025-03-12 DIAGNOSIS — E03.9 ACQUIRED HYPOTHYROIDISM: Primary | ICD-10-CM

## 2025-03-12 PROCEDURE — 99214 OFFICE O/P EST MOD 30 MIN: CPT | Performed by: FAMILY MEDICINE

## 2025-03-12 PROCEDURE — 3078F DIAST BP <80 MM HG: CPT | Performed by: FAMILY MEDICINE

## 2025-03-12 PROCEDURE — G8427 DOCREV CUR MEDS BY ELIG CLIN: HCPCS | Performed by: FAMILY MEDICINE

## 2025-03-12 PROCEDURE — 1090F PRES/ABSN URINE INCON ASSESS: CPT | Performed by: FAMILY MEDICINE

## 2025-03-12 PROCEDURE — 3074F SYST BP LT 130 MM HG: CPT | Performed by: FAMILY MEDICINE

## 2025-03-12 PROCEDURE — 1036F TOBACCO NON-USER: CPT | Performed by: FAMILY MEDICINE

## 2025-03-12 PROCEDURE — 1124F ACP DISCUSS-NO DSCNMKR DOCD: CPT | Performed by: FAMILY MEDICINE

## 2025-03-12 PROCEDURE — G8400 PT W/DXA NO RESULTS DOC: HCPCS | Performed by: FAMILY MEDICINE

## 2025-03-12 PROCEDURE — G8420 CALC BMI NORM PARAMETERS: HCPCS | Performed by: FAMILY MEDICINE

## 2025-03-12 PROCEDURE — 1159F MED LIST DOCD IN RCRD: CPT | Performed by: FAMILY MEDICINE

## 2025-03-12 RX ORDER — LEVOTHYROXINE SODIUM 25 UG/1
25 TABLET ORAL DAILY
Qty: 90 TABLET | Refills: 1 | Status: SHIPPED | OUTPATIENT
Start: 2025-03-12

## 2025-03-12 NOTE — PROGRESS NOTES
3/13/2025    Nicki Javed (:  1950) is a 75 y.o. female, is here for evaluation of the following chief complaint(s):  Discuss Labs (States that she had blood work done at [facility] and that her thyroid levels were off and she was told to f/u with her PCP. )      History of Present Illness  The patient is a 75-year-old female who presents for evaluation of hypothyroidism.    She has been under the care of an electrophysiologist, Dr. Estrada, who recently ordered thyroid function tests. The results indicated severe hypothyroidism, prompting a referral to our clinic for initiation of Synthroid therapy. Despite being on amiodarone for atrial fibrillation, she continues to experience episodes of atrial fibrillation. She reports experiencing fatigue, often falling asleep while watching television, but does not feel tired. She also reports persistent shortness of breath, which she is unsure if it has worsened. Her oxygen saturation levels have been fluctuating, dropping to 87 and rising to 93 with supplemental oxygen. She believes that discontinuing amiodarone may alleviate her symptoms. She has been monitoring her kidney function due to her medication regimen. She is currently wearing a heart monitor, which will be removed tomorrow after a 2-week monitoring period. She has noticed significant bruising on her legs, which she attributes to thin skin. She has been managing these bruises with bandages. She acknowledges the need for increased physical activity but finds it challenging to walk even a mile due to her cardiomyopathy.    MEDICATIONS  Current: amiodarone, Eliquis    Patient's past medical, surgical, social and/or family history reviewed, updated in chart, and are non-contributory (unless otherwise stated).  Medications and allergies also reviewed and updated in chart.     ROS negative unless otherwise specified    Physical Exam  Temp Readings from Last 3 Encounters:   25 97.2 °F (36.2 °C)

## 2025-03-25 ENCOUNTER — EVALUATION (OUTPATIENT)
Dept: PHYSICAL THERAPY | Age: 75
End: 2025-03-25
Payer: MEDICARE

## 2025-03-25 DIAGNOSIS — R26.89 BALANCE PROBLEMS: Primary | ICD-10-CM

## 2025-03-25 PROCEDURE — 97110 THERAPEUTIC EXERCISES: CPT | Performed by: PHYSICAL THERAPIST

## 2025-03-25 PROCEDURE — 97161 PT EVAL LOW COMPLEX 20 MIN: CPT | Performed by: PHYSICAL THERAPIST

## 2025-03-25 NOTE — PROGRESS NOTES
[facility]   Phone: [phone]   Fax: [phone]    Date:  3/25/2025   Patient: Nicki Javed  : 1950  MRN: [id number]  Referring Provider: Nu Resendiz DO  [address]  [address]  [place] [address]     Medical Diagnosis:      Diagnosis Orders   1. Balance problems             SUBJECTIVE:     History: Patient reports decreased functional mobility over the past year  in the community setting with increased fear of falling. Pt notes moving more slowly due to fear of falling and feeling increased sense of cautiousness. Pt uses [facility] for community mobility but not in the home.    Previous PT: yes - helped slightly-- shortened d/t other medical complications.    Related impairments: mobility and safety    Chief complaint: decreased balance, difficulty with stairs, decreased B LE strength/endurance    Behavior: condition is staying the same    Pain: intermittent  Current: 4/10         Symptom Type/Quality: burning, tingling  Location:: R scapular region    Imaging results: No results found.    Past Medical History:  Past Medical History:   Diagnosis Date    Atrial fibrillation (HCC)     Dr. Cantrell     Cancer (HCC)     lung    [facility]    CVA (cerebral vascular accident) (HCC)         Hyperlipidemia     Hypertension     PVD (peripheral vascular disease) with claudication 2016    Skin cancer, basal cell     yrs ago    Spider veins 2016    Varicose veins of both lower extremities 2016     Past Surgical History:   Procedure Laterality Date    BREAST SURGERY Left     yrs ago benign    BRONCHOSCOPY N/A 2020    BRONCHOSCOPY EBUS performed by Laura Arana MD at [facility] ENDOSCOPY    BRONCHOSCOPY N/A 2020    BRONCHOSCOPY NAVIGATIONAL performed by Laura Arana MD at [facility] ENDOSCOPY    BRONCHOSCOPY  2020    BRONCHOSCOPY ALVEOLAR LAVAGE performed by Laura Arana MD at [facility] ENDOSCOPY    BRONCHOSCOPY

## 2025-03-25 NOTE — PROGRESS NOTES
[facility]          Phone: [phone] Fax: [phone]    Physical Therapy Daily Treatment Note  Date:  3/25/2025    Patient Name:  Nicki Javed    :  1950  MRN: [id number]    Evaluating therapist: Emely Mireles, PT, DPT  [id number]    Restrictions/Precautions:  falls risk, anticoagulation medications, pacemaker, cancer in remission <5 years, COPD    Diagnosis:     Diagnosis Orders   1. Balance problems          Treatment Diagnosis:    Insurance/Certification information:  [facility] Medicare Complete  Referring Physician:  Nu Booker DO  Plan of care signed (Y/N):    Visit# / total visits:    Pain level: 4/10 R scapula  Time In:  1100  Time Out:  1145    Subjective:  See initial evaluation    Exercises:  Exercise/Equipment Resistance/Repetitions Other comments            Heel raises 15x      Step ups 10x ea LE leading B HR, 6\" step     Standing hip abduction 10x ea LE B HR     Standing hip ext 10x ea LE B HR     Sit<>stand 5x (to fatigue)             Scapular retractions 10x 3 sec hold                                                                                      Other:  Pt tolerated introduction of TE's and dynamic balance assessment this date without overt LOB. Pt verbalized understanding for safe completion of HEP and to maintain UE support as needed for safety. Pt going on vacation next week and was encouraged to continue amb on beach with 's assistance    Home Exercise Program:  heel raises, hip ext, hip abd, sit<>stand, step ups    Manual Treatments:  TBD    Modalities:  TBD     Time-in Time-out Total Time   94987  Evaluation Low Complexity 1100 1125 25   45728  Evaluation Med Complexity      81028  Evaluation High Complexity      64863  Ther Ex 1125 1145 20   34281  Neuro Re-ed        08487  Ther Activities        10976  Manual Therapy       17607  E-stim       62727  Ultrasound            Session 1100 1145 45       Treatment/Activity

## 2025-04-08 ENCOUNTER — TREATMENT (OUTPATIENT)
Dept: PHYSICAL THERAPY | Age: 75
End: 2025-04-08
Payer: MEDICARE

## 2025-04-08 DIAGNOSIS — R53.1 WEAKNESS: Primary | ICD-10-CM

## 2025-04-08 DIAGNOSIS — R26.89 BALANCE PROBLEMS: ICD-10-CM

## 2025-04-08 PROCEDURE — 97112 NEUROMUSCULAR REEDUCATION: CPT | Performed by: PHYSICAL THERAPIST

## 2025-04-08 PROCEDURE — 97110 THERAPEUTIC EXERCISES: CPT | Performed by: PHYSICAL THERAPIST

## 2025-04-08 NOTE — PROGRESS NOTES
Neuro Re-ed   1130 1140 10   37192  Ther Activities        46699  Manual Therapy       95415  E-stim       27760  Ultrasound            Session 6282 7820 32       Treatment/Activity Tolerance:  [x] Patient tolerated treatment well [] Patient limited by fatigue  [] Patient limited by pain  [] Patient limited by other medical complications  [] Other:     Prognosis: [] Good [x] Fair  [] Poor    Patient Requires Follow-up: [x] Yes  [] No    Plan:   [x] Continue per plan of care [] Alter current plan (see comments)  [] Plan of care initiated [] Hold pending MD visit [] Discharge    Plan for Next Session:  progress endurance and dynamic balance as tolerated      Electronically signed by:  Emely Mireles, PT, DPT  [id number]

## 2025-04-10 ENCOUNTER — TREATMENT (OUTPATIENT)
Dept: PHYSICAL THERAPY | Age: 75
End: 2025-04-10
Payer: MEDICARE

## 2025-04-10 DIAGNOSIS — R53.1 WEAKNESS: Primary | ICD-10-CM

## 2025-04-10 DIAGNOSIS — R26.89 BALANCE PROBLEMS: ICD-10-CM

## 2025-04-10 PROCEDURE — 97112 NEUROMUSCULAR REEDUCATION: CPT | Performed by: PHYSICAL THERAPIST

## 2025-04-10 PROCEDURE — 97110 THERAPEUTIC EXERCISES: CPT | Performed by: PHYSICAL THERAPIST

## 2025-04-10 NOTE — PROGRESS NOTES
TBD    Modalities:  TBD     Time-in Time-out Total Time   37062  Evaluation Low Complexity      97474  Evaluation Med Complexity      67716  Evaluation High Complexity      24700  Ther Ex 1030 1040 10   31455  Neuro Re-ed   1040 1100 20   13105  Ther Activities        56485  Manual Therapy       53701  E-stim       99410  Ultrasound            Session 1030 1100 30       Treatment/Activity Tolerance:  [x] Patient tolerated treatment well [] Patient limited by fatigue  [] Patient limited by pain  [] Patient limited by other medical complications  [] Other:     Prognosis: [] Good [x] Fair  [] Poor    Patient Requires Follow-up: [x] Yes  [] No    Plan:   [x] Continue per plan of care [] Alter current plan (see comments)  [] Plan of care initiated [] Hold pending MD visit [] Discharge    Plan for Next Session:  progress endurance and dynamic balance as tolerated      Electronically signed by:  Emely Mireles, PT, DPT  [id number]

## 2025-04-15 ENCOUNTER — TREATMENT (OUTPATIENT)
Dept: PHYSICAL THERAPY | Age: 75
End: 2025-04-15
Payer: MEDICARE

## 2025-04-15 DIAGNOSIS — R53.1 WEAKNESS: Primary | ICD-10-CM

## 2025-04-15 DIAGNOSIS — R26.89 BALANCE PROBLEMS: ICD-10-CM

## 2025-04-15 PROCEDURE — 97112 NEUROMUSCULAR REEDUCATION: CPT | Performed by: PHYSICAL THERAPIST

## 2025-04-15 PROCEDURE — 97110 THERAPEUTIC EXERCISES: CPT | Performed by: PHYSICAL THERAPIST

## 2025-04-15 NOTE — PROGRESS NOTES
[facility]          Phone: [phone] Fax: [phone]    Physical Therapy Daily Treatment Note  Date:  4/15/2025    Patient Name:  Nicki Javed    :  1950  MRN: [id number]    Evaluating therapist: Emely Mireles, PT, DPT  [id number]    Restrictions/Precautions:  falls risk, anticoagulation medications, pacemaker, cancer in remission <5 years, COPD    Diagnosis:     Diagnosis Orders   1. Weakness        2. Balance problems            Treatment Diagnosis:    Insurance/Certification information:  [facility] Medicare Complete  Referring Physician:  Nu Resendiz DO  Plan of care signed (Y/N):  yes  Visit# / total visits:    Pain level: -- global fatigue.  Time In:  1430  Time Out:  1500    Subjective:  Pt reports overall fatigue this date upon arrival. Notes she has already had to ascend/descend stairs to her second floor 2x today.     Exercises:  Exercise/Equipment Resistance/Repetitions Other comments            Heel raises 15x B UE support     Step ups 10 ea LE leading forward  5x lateral ea side B HR, 6\" step     3-way kicks 10x ea single UE support          Sit<>stand Light B UE use            Scapular retractions             Side stepping        Tandem walking 3x laps, 20' path Min<>CGA     Braiding stepping 4x laps in // bars B UE support          Stepper 8 min Level 1           Obstacle course 2x laps See below                               Other:  Pt tolerated progression of standing TE's for strength and balance this date with decreased reliance on UE support again this date. Pt continues to build endurance and standing dynamic balance throughout. Plan for full flight of stairs next session to determine functional carryover of exercises.      Home Exercise Program:  heel raises, hip ext, hip abd, sit<>stand, step ups    Manual Treatments:  TBD    Modalities:  TBD     Time-in Time-out Total Time   23981  Evaluation Low Complexity      17512  Evaluation Med

## 2025-04-18 ENCOUNTER — TREATMENT (OUTPATIENT)
Dept: PHYSICAL THERAPY | Age: 75
End: 2025-04-18

## 2025-04-18 DIAGNOSIS — R53.1 WEAKNESS: Primary | ICD-10-CM

## 2025-04-18 DIAGNOSIS — R26.89 BALANCE PROBLEMS: ICD-10-CM

## 2025-04-18 NOTE — PROGRESS NOTES
[facility]          Phone: [phone] Fax: [phone]    Physical Therapy Daily Treatment Note  Date:  2025    Patient Name:  Nicki Javed    :  1950  MRN: [id number]    Evaluating therapist: Emely Mireles PT, DPT  [id number]    Restrictions/Precautions:  falls risk, anticoagulation medications, pacemaker, cancer in remission <5 years, COPD    Diagnosis:     Diagnosis Orders   1. Weakness        2. Balance problems              Treatment Diagnosis:    Insurance/Certification information:  [facility] Medicare Complete  Referring Physician:  Nu Resendiz DO  Plan of care signed (Y/N):  yes  Visit# / total visits:    Pain level: -- global fatigue.  Time In:  1100  Time Out:  1130    Subjective:  Pt has no new reports this date.    Exercises:  Exercise/Equipment Resistance/Repetitions Other comments            Heel raises 15x B UE support     Step ups B HR, 6\" step     3-way kicks 10x ea single UE support          Sit<>stand Light B UE use            Scapular retractions             Side stepping        Tandem walking 3x laps, 20' path CGA     Braiding stepping 2x laps, 20' path Juventino B HHA     Retro walking 2x laps, 20' path SBA     High knee marching forward 2x laps, 20' path CGA         Stepper Level 1          Obstacle course See below          Stair negotiation 1 flight (10 steps) Single HR, SBA, non-reciprocal                   Other:  Pt tolerated progression of standing TE's with emphasis on dynamic balance this date. Pt reports feeling limitations on stairs have been less d/t B LE strength and primarily d/t respiratory and cardiac co-morbidities since starting therapy. Pt was provided flyer for community balance class near her home and encouraged this as a transitional class when therapy is completed to continue to maintain balance progression.      Home Exercise Program:  heel raises, hip ext, hip abd, sit<>stand, step ups    Manual Treatments:

## 2025-04-22 ENCOUNTER — TREATMENT (OUTPATIENT)
Dept: PHYSICAL THERAPY | Age: 75
End: 2025-04-22
Payer: MEDICARE

## 2025-04-22 DIAGNOSIS — R26.89 BALANCE PROBLEMS: ICD-10-CM

## 2025-04-22 DIAGNOSIS — R53.1 WEAKNESS: Primary | ICD-10-CM

## 2025-04-22 PROCEDURE — 97112 NEUROMUSCULAR REEDUCATION: CPT | Performed by: PHYSICAL THERAPIST

## 2025-04-22 PROCEDURE — 97110 THERAPEUTIC EXERCISES: CPT | Performed by: PHYSICAL THERAPIST

## 2025-04-22 NOTE — PROGRESS NOTES
[facility]          Phone: [phone] Fax: [phone]    Physical Therapy Daily Treatment Note  Date:  2025    Patient Name:  Nicki Javed    :  1950  MRN: [id number]    Evaluating therapist: Emely Mireles, PT, DPT  [id number]    Restrictions/Precautions:  falls risk, anticoagulation medications, pacemaker, cancer in remission <5 years, COPD    Diagnosis:     Diagnosis Orders   1. Weakness        2. Balance problems            Treatment Diagnosis:    Insurance/Certification information:  [facility] Medicare Complete  Referring Physician:  Nu Resendiz DO  Plan of care signed (Y/N):  yes  Visit# / total visits:    Pain level: -- global fatigue.  Time In:  1100  Time Out:  1130    Subjective:  Pt has no new reports this date.    Exercises:  Exercise/Equipment Resistance/Repetitions Other comments            Heel raises B UE support     Step ups B HR, 6\" step     3-way kicks single UE support          Sit<>stand Light B UE use            Scapular retractions             Side stepping        Tandem walking 2x laps, 20' path CGA     Braiding stepping 2x laps ea direction, 20' path Juventino B HHA     Tip toe walking 1x lap around gym      Heel walking 1x lap around gym          Retro walking SBA     High knee marching forward CGA         Stepper 8 min Level 1          Obstacle course 3x laps See below          Stair negotiation Single HR, SBA, non-reciprocal     Alt toe taps to cone 10x ea LE Single foam cone, intermittent B UE support for balance correction            Other:  Pt tolerated progression of dynamic balance exercises this date with simulation of walking in the yard with uneven mat surface. Pt encouraged to initiate alternating cone taps to step in her living room with B UE support available.      Home Exercise Program:  heel raises, hip ext, hip abd, sit<>stand, step ups    Manual Treatments:  TBD    Modalities:  TBD     Time-in Time-out Total Time   94296

## 2025-04-24 ENCOUNTER — TREATMENT (OUTPATIENT)
Dept: PHYSICAL THERAPY | Age: 75
End: 2025-04-24

## 2025-04-24 DIAGNOSIS — R26.89 BALANCE PROBLEMS: ICD-10-CM

## 2025-04-24 DIAGNOSIS — R53.1 WEAKNESS: Primary | ICD-10-CM

## 2025-04-24 NOTE — PROGRESS NOTES
[facility]          Phone: [phone] Fax: [phone]    Physical Therapy Daily Treatment Note  Date:  2025    Patient Name:  Nicki Javed    :  1950  MRN: [id number]    Evaluating therapist: Emely Mireles PT, DPT  [id number]    Restrictions/Precautions:  falls risk, anticoagulation medications, pacemaker, cancer in remission <5 years, COPD    Diagnosis:     Diagnosis Orders   1. Weakness        2. Balance problems              Treatment Diagnosis:    Insurance/Certification information:  [facility] Medicare Complete  Referring Physician:  Nu Resendiz DO  Plan of care signed (Y/N):  yes  Visit# / total visits:  8  Pain level: 0/10  Time In:  1055  Time Out:  1125    Subjective:  Pt has no new reports this date.    Exercises:  Exercise/Equipment Resistance/Repetitions Other comments            Heel raises B UE support     Step ups B HR, 6\" step     3-way kicks single UE support         Sit<>stand Light B UE use           Scapular retractions            Side stepping        Tandem walking 2x laps, 20' path CGA     Braiding stepping Juventino B HHA     Tip toe walking      Heel walking          Retro walking SBA     High knee marching forward CGA         Stepper 8 min Level 1          Obstacle course See below         Stair negotiation 3 flights descending only Single HR, SBA, non-reciprocal     Alt toe taps to cone 10x ea LE Single foam cone, intermittent B UE support for balance correction     Outdoor balance activities on uneven surfaces See comments      Other:  Pt tolerated outdoor functional activities with emphasis on balance on uneven surfaces, stooping to ground level, negotiating curbs and handicap ramp cut outs of sidewalk, and ambulation with head turns for safe parking lot negotiation. Pt appears somewhat hesitant with balance challenges reaching for therapist hand for support but requiring only CGA once holding therapist.    Home Exercise Program:

## 2025-04-30 ENCOUNTER — TREATMENT (OUTPATIENT)
Dept: PHYSICAL THERAPY | Age: 75
End: 2025-04-30
Payer: MEDICARE

## 2025-04-30 DIAGNOSIS — R53.1 WEAKNESS: Primary | ICD-10-CM

## 2025-04-30 DIAGNOSIS — R26.89 BALANCE PROBLEMS: ICD-10-CM

## 2025-04-30 PROCEDURE — 97530 THERAPEUTIC ACTIVITIES: CPT | Performed by: PHYSICAL THERAPIST

## 2025-04-30 NOTE — PROGRESS NOTES
[facility]                Phone: [phone] Fax: [phone]    Physical Therapy  Outpatient Discharge Summary     Date:  2025    Patient Name:  Nicki Javed    :  1950  MRN: [id number]    DIAGNOSIS:     Diagnosis Orders   1. Weakness        2. Balance problems          REFERRING PHYSICIAN:  Nu Resendiz DO    ATTENDANCE:  Pt has attended 8 of 8 scheduled treatments from 3/25/2025 to 2025.  TREATMENTS RECEIVED:  Skilled PT services have included therapeutic exercises to increase B LE strength and balance on a variety of surfaces in both dynamic and static positions.    INITIAL STATUS:  Strength decreased  Balance limitations in functional mobility over uneven surfaces and change in speed/direction required for safe community engagement  Decreased functional ability with walking, stairs, endurance  Activities-specific Balance Confidence Scale: 56.875   DGI: 15/24    FINAL STATUS:  Strength 4/5 B LE overall  Demonstrates good ability to safely negotiate community and outdoor settings without LOB or B LE instability  Pt is able to ascend/descend stairs with single HR and non-reciprocal pattern safely; feels limitations on stairs are largely due to respiratory status and feeling uncertain with foot placement  Improved overall endurance and activity tolerance demonstrated within sessions  Independent with HEP  Activities-specific Balance Confidence Scale: 65.625% confidence   DGI:     GOALS:  4 out of 4 Long Term Goals were obtained.    LONG TERM GOALS NOT OBTAINED/REASON:  All LTG have been met or exceeded    PATIENT GOALS:  improved balance, improved endurance, decrease scapular pain improved balance, improved endurance, decrease scapular pain -met    REASON FOR DISCHARGE:  All LTG have been met or exceeded    PATIENT EDUCATION/INSTRUCTIONS:  continue with activity pacing and HEP. Pt provided with handout for [employer] community class 
mobility as tolerated along uneven surfaces and with household/yard management activities. Pt is independent with HEP and in agreement for d/c at this time with plan to call PCP for new referral if symptoms worsen.    Home Exercise Program:  heel raises, hip ext, hip abd, sit<>stand, step ups    Manual Treatments:  TBD    Modalities:  TBD     Time-in Time-out Total Time   41933  Evaluation Low Complexity      97486  Evaluation Med Complexity      60336  Evaluation High Complexity      61690  Ther Ex      71543  Neuro Re-ed        85880  Ther Activities   0915 0945 30   10866  Manual Therapy       52581  E-stim       10865  Ultrasound            Session 0915 0945 30       Treatment/Activity Tolerance:  [x] Patient tolerated treatment well [] Patient limited by fatigue  [] Patient limited by pain  [] Patient limited by other medical complications  [] Other:     Prognosis: [] Good [x] Fair  [] Poor    Patient Requires Follow-up: [x] Yes  [] No    Plan:   [x] Continue per plan of care [] Alter current plan (see comments)  [] Plan of care initiated [] Hold pending MD visit [] Discharge    Plan for Next Session:  progress endurance and dynamic balance as tolerated      Electronically signed by:  Emely Mireles, PT, DPT  [id number]

## 2025-05-01 ENCOUNTER — TELEPHONE (OUTPATIENT)
Dept: FAMILY MEDICINE CLINIC | Age: 75
End: 2025-05-01

## 2025-05-01 DIAGNOSIS — I10 PRIMARY HYPERTENSION: ICD-10-CM

## 2025-05-01 DIAGNOSIS — E03.9 ACQUIRED HYPOTHYROIDISM: Primary | ICD-10-CM

## 2025-05-01 DIAGNOSIS — E78.2 MIXED HYPERLIPIDEMIA: ICD-10-CM

## 2025-05-07 ENCOUNTER — HOSPITAL ENCOUNTER (OUTPATIENT)
Age: 75
End: 2025-05-07
Payer: MEDICARE

## 2025-05-07 ENCOUNTER — HOSPITAL ENCOUNTER (OUTPATIENT)
Age: 75
Discharge: HOME | End: 2025-05-07
Payer: MEDICARE

## 2025-05-07 DIAGNOSIS — E78.2 MIXED HYPERLIPIDEMIA: ICD-10-CM

## 2025-05-07 DIAGNOSIS — I10 PRIMARY HYPERTENSION: ICD-10-CM

## 2025-05-07 DIAGNOSIS — E03.9 ACQUIRED HYPOTHYROIDISM: ICD-10-CM

## 2025-05-07 LAB
ALBUMIN SERPL-MCNC: 4.2 G/DL (ref 3.5–5.2)
ALP SERPL-CCNC: 92 U/L (ref 35–104)
ALT SERPL-CCNC: 28 U/L (ref 0–32)
ANION GAP SERPL CALCULATED.3IONS-SCNC: 13 MMOL/L (ref 7–16)
AST SERPL-CCNC: 51 U/L (ref 0–31)
BILIRUB SERPL-MCNC: 1.3 MG/DL (ref 0–1.2)
BUN SERPL-MCNC: 41 MG/DL (ref 6–23)
CALCIUM SERPL-MCNC: 9.7 MG/DL (ref 8.6–10.2)
CHLORIDE SERPL-SCNC: 102 MMOL/L (ref 98–107)
CHOLEST SERPL-MCNC: 116 MG/DL
CO2 SERPL-SCNC: 23 MMOL/L (ref 22–29)
CREAT SERPL-MCNC: 2.2 MG/DL (ref 0.5–1)
ERYTHROCYTE [DISTWIDTH] IN BLOOD BY AUTOMATED COUNT: 15.9 % (ref 11.5–15)
GFR, ESTIMATED: 23 ML/MIN/1.73M2
GLUCOSE SERPL-MCNC: 80 MG/DL (ref 74–99)
HCT VFR BLD AUTO: 35.6 % (ref 34–48)
HDLC SERPL-MCNC: 39 MG/DL
HGB BLD-MCNC: 11.4 G/DL (ref 11.5–15.5)
LDLC SERPL CALC-MCNC: 60 MG/DL
MCH RBC QN AUTO: 29.8 PG (ref 26–35)
MCHC RBC AUTO-ENTMCNC: 32 G/DL (ref 32–34.5)
MCV RBC AUTO: 93 FL (ref 80–99.9)
PLATELET # BLD AUTO: 304 K/UL (ref 130–450)
PMV BLD AUTO: 8.4 FL (ref 7–12)
POTASSIUM SERPL-SCNC: 4.2 MMOL/L (ref 3.5–5)
PROT SERPL-MCNC: 7.7 G/DL (ref 6.4–8.3)
RBC # BLD AUTO: 3.83 M/UL (ref 3.5–5.5)
SODIUM SERPL-SCNC: 138 MMOL/L (ref 132–146)
TRIGL SERPL-MCNC: 89 MG/DL
TSH SERPL DL<=0.05 MIU/L-ACNC: 13.1 UIU/ML (ref 0.27–4.2)
VLDLC SERPL CALC-MCNC: 18 MG/DL
WBC OTHER # BLD: 7.9 K/UL (ref 4.5–11.5)

## 2025-05-07 PROCEDURE — 84443 ASSAY THYROID STIM HORMONE: CPT

## 2025-05-07 PROCEDURE — 85027 COMPLETE CBC AUTOMATED: CPT

## 2025-05-07 PROCEDURE — 36415 COLL VENOUS BLD VENIPUNCTURE: CPT

## 2025-05-07 PROCEDURE — 80061 LIPID PANEL: CPT

## 2025-05-07 PROCEDURE — 80053 COMPREHEN METABOLIC PANEL: CPT

## 2025-05-08 ENCOUNTER — RESULTS FOLLOW-UP (OUTPATIENT)
Dept: FAMILY MEDICINE CLINIC | Age: 75
End: 2025-05-08

## 2025-05-12 ENCOUNTER — OFFICE VISIT (OUTPATIENT)
Dept: FAMILY MEDICINE CLINIC | Age: 75
End: 2025-05-12
Payer: MEDICARE

## 2025-05-12 VITALS
OXYGEN SATURATION: 99 % | HEIGHT: 65 IN | SYSTOLIC BLOOD PRESSURE: 118 MMHG | BODY MASS INDEX: 20.18 KG/M2 | TEMPERATURE: 97.5 F | WEIGHT: 121.1 LBS | RESPIRATION RATE: 16 BRPM | DIASTOLIC BLOOD PRESSURE: 62 MMHG | HEART RATE: 59 BPM

## 2025-05-12 DIAGNOSIS — E03.9 ACQUIRED HYPOTHYROIDISM: Primary | ICD-10-CM

## 2025-05-12 DIAGNOSIS — E78.2 MIXED HYPERLIPIDEMIA: ICD-10-CM

## 2025-05-12 DIAGNOSIS — I10 PRIMARY HYPERTENSION: ICD-10-CM

## 2025-05-12 PROCEDURE — 3078F DIAST BP <80 MM HG: CPT | Performed by: FAMILY MEDICINE

## 2025-05-12 PROCEDURE — G8400 PT W/DXA NO RESULTS DOC: HCPCS | Performed by: FAMILY MEDICINE

## 2025-05-12 PROCEDURE — 99214 OFFICE O/P EST MOD 30 MIN: CPT | Performed by: FAMILY MEDICINE

## 2025-05-12 PROCEDURE — 1036F TOBACCO NON-USER: CPT | Performed by: FAMILY MEDICINE

## 2025-05-12 PROCEDURE — G2211 COMPLEX E/M VISIT ADD ON: HCPCS | Performed by: FAMILY MEDICINE

## 2025-05-12 PROCEDURE — 1090F PRES/ABSN URINE INCON ASSESS: CPT | Performed by: FAMILY MEDICINE

## 2025-05-12 PROCEDURE — G8420 CALC BMI NORM PARAMETERS: HCPCS | Performed by: FAMILY MEDICINE

## 2025-05-12 PROCEDURE — G8427 DOCREV CUR MEDS BY ELIG CLIN: HCPCS | Performed by: FAMILY MEDICINE

## 2025-05-12 PROCEDURE — 3074F SYST BP LT 130 MM HG: CPT | Performed by: FAMILY MEDICINE

## 2025-05-12 PROCEDURE — 1124F ACP DISCUSS-NO DSCNMKR DOCD: CPT | Performed by: FAMILY MEDICINE

## 2025-05-12 PROCEDURE — 1159F MED LIST DOCD IN RCRD: CPT | Performed by: FAMILY MEDICINE

## 2025-05-12 RX ORDER — LEVOTHYROXINE SODIUM 50 UG/1
50 TABLET ORAL DAILY
Qty: 90 TABLET | Refills: 1 | Status: SHIPPED | OUTPATIENT
Start: 2025-05-12

## 2025-05-12 NOTE — PROGRESS NOTES
2025    Nicki Javed (:  1950) is a 75 y.o. female, is here for evaluation of the following chief complaint(s):  Hypothyroidism (Blood work was done on )      History of Present Illness  The patient presents for evaluation of hypothyroidism and abnormal kidney function.    She is currently on a regimen of levothyroxine 25 mcg, which she takes without fail. She adheres to the recommended practice of abstaining from food intake for a period of 30 minutes post-medication administration. Recent blood work indicates that her thyroid levels have improved but still require adjustment.    Her kidney function has been monitored by her cardiologist, as she does not have a nephrologist. She has a virtual visit scheduled with her cardiologist from [facility] tomorrow. Her kidney function is elevated but not to the extent requiring dialysis. She is currently taking amiodarone.    Recent blood work also shows that her cholesterol levels are within a healthy range, with her bad cholesterol at 60. Hemoglobin and platelet counts are reported to be the best they have been in a long time.    Patient's past medical, surgical, social and/or family history reviewed, updated in chart, and are non-contributory (unless otherwise stated).  Medications and allergies also reviewed and updated in chart.     ROS negative unless otherwise specified    Physical Exam  Temp Readings from Last 3 Encounters:   25 97.5 °F (36.4 °C) (Temporal)   25 97.2 °F (36.2 °C) (Temporal)   25 97.8 °F (36.6 °C) (Temporal)     Wt Readings from Last 3 Encounters:   25 54.9 kg (121 lb 1.6 oz)   25 54.8 kg (120 lb 12.8 oz)   25 54.4 kg (120 lb)     BP Readings from Last 3 Encounters:   25 118/62   25 120/60   25 122/78     Pulse Readings from Last 3 Encounters:   25 59   25 60   25 82       General appearance: alert, well appearing, and in no distress, oriented to

## 2025-06-27 ENCOUNTER — TELEPHONE (OUTPATIENT)
Dept: PHARMACY | Facility: CLINIC | Age: 75
End: 2025-06-27

## 2025-06-27 NOTE — TELEPHONE ENCOUNTER
[facility]: ADHERENCE REVIEW  Identified care gap per [employer]: fills at [facility]: Statin adherence      ASSESSMENT  STATIN ADHERENCE    Insurance Records claims through 2025 (Prior Year PDC = not reported; YTD PDC = 68%; Potential Fail Date: 07/15/2025):   ATORVASTATIN TAB 20MG  last filled on 2025 for 90 day supply. Next refill due: 2025    Prescribed si tablet/capsule daily    Per Insurer Portal: last filled on 2025 for 90 day supply.     Per [facility]: last shipped out on 2025 for 90 day supply. 0 refills remaining. Cannot refill until 2025.    Lab Results   Component Value Date    CHOL 116 2025    TRIG 89 2025    HDL 39 (L) 2025     Lab Results   Component Value Date    LDL 60 2025      ALT   Date Value Ref Range Status   2025 28 0 - 32 U/L Final     AST   Date Value Ref Range Status   2025 51 (H) 0 - 31 U/L Final     The ASCVD Risk score (Regino CAREY, et al., 2019) failed to calculate for the following reasons:    Risk score cannot be calculated because patient has a medical history suggesting prior/existing ASCVD     PLAN  Per insurer report, LIS-0 - co-pays are based on tiers and patient is subject to coverage gap.      The following are interventions that have been identified:   Patient OVERDUE refilling ATORVASTATIN TAB 20MG  and active on home medication list.   ATORVASTATIN TAB 20MG shipped out on 2025 for 90 day supply.    No patient outreach planned at this time.      Last Visit: 2025  Next Visit: 2025      Catalina Burris MA  [facility]   [facility]  [phone] Option 1     For Pharmacy Admin Tracking Only    Program: [facility] [id number]  CPA in place:  No  Recommendation Provided To: Pharmacy: 1  Intervention Detail: Adherence Monitorin  Intervention Accepted By: Pharmacy: 1  Gap Closed?: Yes   Time Spent (min): 15

## 2025-07-09 ENCOUNTER — HOSPITAL ENCOUNTER (OUTPATIENT)
Age: 75
Discharge: HOME | End: 2025-07-09
Payer: MEDICARE

## 2025-07-09 DIAGNOSIS — E03.9 ACQUIRED HYPOTHYROIDISM: ICD-10-CM

## 2025-07-09 LAB — TSH SERPL DL<=0.05 MIU/L-ACNC: 7.35 UIU/ML (ref 0.27–4.2)

## 2025-07-09 PROCEDURE — 36415 COLL VENOUS BLD VENIPUNCTURE: CPT

## 2025-07-09 PROCEDURE — 84443 ASSAY THYROID STIM HORMONE: CPT

## 2025-07-11 DIAGNOSIS — E03.9 ACQUIRED HYPOTHYROIDISM: Primary | ICD-10-CM

## 2025-07-11 RX ORDER — LEVOTHYROXINE SODIUM 75 UG/1
75 TABLET ORAL DAILY
Qty: 30 TABLET | Refills: 3 | Status: SHIPPED | OUTPATIENT
Start: 2025-07-11

## 2025-08-18 DIAGNOSIS — E03.9 ACQUIRED HYPOTHYROIDISM: ICD-10-CM

## 2025-08-18 LAB — TSH SERPL DL<=0.05 MIU/L-ACNC: 4 UIU/ML (ref 0.27–4.2)

## 2025-08-25 ENCOUNTER — OFFICE VISIT (OUTPATIENT)
Dept: FAMILY MEDICINE CLINIC | Age: 75
End: 2025-08-25

## 2025-08-25 VITALS
TEMPERATURE: 97.5 F | DIASTOLIC BLOOD PRESSURE: 64 MMHG | RESPIRATION RATE: 16 BRPM | WEIGHT: 122.6 LBS | BODY MASS INDEX: 20.43 KG/M2 | HEIGHT: 65 IN | SYSTOLIC BLOOD PRESSURE: 120 MMHG

## 2025-08-25 DIAGNOSIS — E03.9 ACQUIRED HYPOTHYROIDISM: ICD-10-CM

## 2025-08-25 DIAGNOSIS — I10 PRIMARY HYPERTENSION: ICD-10-CM

## 2025-08-25 DIAGNOSIS — E78.2 MIXED HYPERLIPIDEMIA: Primary | ICD-10-CM

## (undated) DEVICE — GRADUATE TRIANG MEASURE 1000ML BLK PRNT

## (undated) DEVICE — FORCEPS L110MM DIA1.7MM PREMARKED REINF SHFT

## (undated) DEVICE — NEEDLE CYTO 21GA L137MM DIA1.9MM PULM BRAID TAPR SHTH OPT 5PK

## (undated) DEVICE — SOLUTION IV IRRIG 500ML 0.9% SODIUM CHL 2F7123

## (undated) DEVICE — Device

## (undated) DEVICE — ADAPTER TBNG DIA15MM SWVL FBROPT BRONCHSCP TERM 2 AXIS PEEP

## (undated) DEVICE — Device: Brand: BALLOON

## (undated) DEVICE — SURGICAL PROCEDURE PACK BRONCH

## (undated) DEVICE — SYRINGE MED 50ML LUERLOCK TIP

## (undated) DEVICE — SPONGE GZ W4XL4IN RAYON POLY FILL CVR W/ NONWOVEN FAB

## (undated) DEVICE — Device: Brand: MEDEX

## (undated) DEVICE — SET EXTN IV L30IN TBNG DIA0.1IN PRIMING 4ML MACBOR FEM ADPT

## (undated) DEVICE — BLOCK BITE 60FR RUBBER ADLT DENTAL

## (undated) DEVICE — KIT ENDO INSTR CATH MEDL FIRM TIP EXT WRK CHAN LOCATABLE